# Patient Record
Sex: MALE | Race: BLACK OR AFRICAN AMERICAN | NOT HISPANIC OR LATINO | URBAN - METROPOLITAN AREA
[De-identification: names, ages, dates, MRNs, and addresses within clinical notes are randomized per-mention and may not be internally consistent; named-entity substitution may affect disease eponyms.]

---

## 2022-06-23 ENCOUNTER — EMERGENCY (EMERGENCY)
Facility: HOSPITAL | Age: 52
LOS: 1 days | Discharge: HOME | End: 2022-06-23
Attending: EMERGENCY MEDICINE
Payer: MEDICAID

## 2022-06-23 VITALS
RESPIRATION RATE: 18 BRPM | SYSTOLIC BLOOD PRESSURE: 120 MMHG | DIASTOLIC BLOOD PRESSURE: 73 MMHG | HEART RATE: 96 BPM | OXYGEN SATURATION: 100 %

## 2022-06-23 VITALS
SYSTOLIC BLOOD PRESSURE: 119 MMHG | OXYGEN SATURATION: 96 % | DIASTOLIC BLOOD PRESSURE: 82 MMHG | RESPIRATION RATE: 20 BRPM | HEART RATE: 132 BPM | HEIGHT: 64 IN | TEMPERATURE: 99 F | WEIGHT: 134.92 LBS

## 2022-06-23 LAB
ALBUMIN SERPL ELPH-MCNC: 3.9 G/DL — SIGNIFICANT CHANGE UP (ref 3.3–5)
ALBUMIN SERPL ELPH-MCNC: 3.9 G/DL — SIGNIFICANT CHANGE UP (ref 3.3–5)
ALP SERPL-CCNC: 139 U/L — HIGH (ref 40–120)
ALP SERPL-CCNC: 148 U/L — HIGH (ref 40–120)
ALT FLD-CCNC: 63 U/L — HIGH (ref 10–45)
ALT FLD-CCNC: 68 U/L — HIGH (ref 10–45)
ANION GAP SERPL CALC-SCNC: 22 MMOL/L — HIGH (ref 5–17)
ANION GAP SERPL CALC-SCNC: 24 MMOL/L — HIGH (ref 5–17)
ANISOCYTOSIS BLD QL: SIGNIFICANT CHANGE UP
APTT BLD: 32.8 SEC — SIGNIFICANT CHANGE UP (ref 27.5–35.5)
AST SERPL-CCNC: 149 U/L — HIGH (ref 10–40)
AST SERPL-CCNC: 170 U/L — HIGH (ref 10–40)
BASOPHILS # BLD AUTO: 0.06 K/UL — SIGNIFICANT CHANGE UP (ref 0–0.2)
BASOPHILS NFR BLD AUTO: 1.8 % — SIGNIFICANT CHANGE UP (ref 0–2)
BILIRUB SERPL-MCNC: 0.4 MG/DL — SIGNIFICANT CHANGE UP (ref 0.2–1.2)
BILIRUB SERPL-MCNC: 0.4 MG/DL — SIGNIFICANT CHANGE UP (ref 0.2–1.2)
BUN SERPL-MCNC: 12 MG/DL — SIGNIFICANT CHANGE UP (ref 7–23)
BUN SERPL-MCNC: 14 MG/DL — SIGNIFICANT CHANGE UP (ref 7–23)
CALCIUM SERPL-MCNC: 8.3 MG/DL — LOW (ref 8.4–10.5)
CALCIUM SERPL-MCNC: 8.4 MG/DL — SIGNIFICANT CHANGE UP (ref 8.4–10.5)
CHLORIDE SERPL-SCNC: 97 MMOL/L — SIGNIFICANT CHANGE UP (ref 96–108)
CHLORIDE SERPL-SCNC: 97 MMOL/L — SIGNIFICANT CHANGE UP (ref 96–108)
CO2 SERPL-SCNC: 20 MMOL/L — LOW (ref 22–31)
CO2 SERPL-SCNC: 23 MMOL/L — SIGNIFICANT CHANGE UP (ref 22–31)
CREAT SERPL-MCNC: 0.58 MG/DL — SIGNIFICANT CHANGE UP (ref 0.5–1.3)
CREAT SERPL-MCNC: 0.65 MG/DL — SIGNIFICANT CHANGE UP (ref 0.5–1.3)
EGFR: 116 ML/MIN/1.73M2 — SIGNIFICANT CHANGE UP
EGFR: 120 ML/MIN/1.73M2 — SIGNIFICANT CHANGE UP
ELLIPTOCYTES BLD QL SMEAR: SLIGHT — SIGNIFICANT CHANGE UP
EOSINOPHIL # BLD AUTO: 0.03 K/UL — SIGNIFICANT CHANGE UP (ref 0–0.5)
EOSINOPHIL NFR BLD AUTO: 0.9 % — SIGNIFICANT CHANGE UP (ref 0–6)
ETHANOL SERPL-MCNC: 365 MG/DL — HIGH (ref 0–10)
GLUCOSE SERPL-MCNC: 117 MG/DL — HIGH (ref 70–99)
GLUCOSE SERPL-MCNC: 122 MG/DL — HIGH (ref 70–99)
HCT VFR BLD CALC: 36.2 % — LOW (ref 39–50)
HGB BLD-MCNC: 12.3 G/DL — LOW (ref 13–17)
INR BLD: 1.02 RATIO — SIGNIFICANT CHANGE UP (ref 0.88–1.16)
LYMPHOCYTES # BLD AUTO: 1.19 K/UL — SIGNIFICANT CHANGE UP (ref 1–3.3)
LYMPHOCYTES # BLD AUTO: 36.6 % — SIGNIFICANT CHANGE UP (ref 13–44)
MACROCYTES BLD QL: SIGNIFICANT CHANGE UP
MANUAL SMEAR VERIFICATION: SIGNIFICANT CHANGE UP
MCHC RBC-ENTMCNC: 33.7 PG — SIGNIFICANT CHANGE UP (ref 27–34)
MCHC RBC-ENTMCNC: 34 GM/DL — SIGNIFICANT CHANGE UP (ref 32–36)
MCV RBC AUTO: 99.2 FL — SIGNIFICANT CHANGE UP (ref 80–100)
MONOCYTES # BLD AUTO: 0.29 K/UL — SIGNIFICANT CHANGE UP (ref 0–0.9)
MONOCYTES NFR BLD AUTO: 8.9 % — SIGNIFICANT CHANGE UP (ref 2–14)
NEUTROPHILS # BLD AUTO: 1.65 K/UL — LOW (ref 1.8–7.4)
NEUTROPHILS NFR BLD AUTO: 50.9 % — SIGNIFICANT CHANGE UP (ref 43–77)
PLAT MORPH BLD: NORMAL — SIGNIFICANT CHANGE UP
PLATELET # BLD AUTO: 64 K/UL — LOW (ref 150–400)
POTASSIUM SERPL-MCNC: 3.7 MMOL/L — SIGNIFICANT CHANGE UP (ref 3.5–5.3)
POTASSIUM SERPL-MCNC: 3.8 MMOL/L — SIGNIFICANT CHANGE UP (ref 3.5–5.3)
POTASSIUM SERPL-SCNC: 3.7 MMOL/L — SIGNIFICANT CHANGE UP (ref 3.5–5.3)
POTASSIUM SERPL-SCNC: 3.8 MMOL/L — SIGNIFICANT CHANGE UP (ref 3.5–5.3)
PROT SERPL-MCNC: 7 G/DL — SIGNIFICANT CHANGE UP (ref 6–8.3)
PROT SERPL-MCNC: 7.2 G/DL — SIGNIFICANT CHANGE UP (ref 6–8.3)
PROTHROM AB SERPL-ACNC: 11.7 SEC — SIGNIFICANT CHANGE UP (ref 10.5–13.4)
RBC # BLD: 3.65 M/UL — LOW (ref 4.2–5.8)
RBC # FLD: 12.8 % — SIGNIFICANT CHANGE UP (ref 10.3–14.5)
RBC BLD AUTO: ABNORMAL
SMUDGE CELLS # BLD: PRESENT — SIGNIFICANT CHANGE UP
SODIUM SERPL-SCNC: 141 MMOL/L — SIGNIFICANT CHANGE UP (ref 135–145)
SODIUM SERPL-SCNC: 142 MMOL/L — SIGNIFICANT CHANGE UP (ref 135–145)
TARGETS BLD QL SMEAR: SLIGHT — SIGNIFICANT CHANGE UP
TROPONIN T, HIGH SENSITIVITY RESULT: 20 NG/L — SIGNIFICANT CHANGE UP (ref 0–51)
VARIANT LYMPHS # BLD: 0.9 % — SIGNIFICANT CHANGE UP (ref 0–6)
WBC # BLD: 3.24 K/UL — LOW (ref 3.8–10.5)
WBC # FLD AUTO: 3.24 K/UL — LOW (ref 3.8–10.5)

## 2022-06-23 PROCEDURE — 93010 ELECTROCARDIOGRAM REPORT: CPT | Mod: 76,59

## 2022-06-23 PROCEDURE — 71045 X-RAY EXAM CHEST 1 VIEW: CPT

## 2022-06-23 PROCEDURE — 99285 EMERGENCY DEPT VISIT HI MDM: CPT | Mod: 25

## 2022-06-23 PROCEDURE — 80053 COMPREHEN METABOLIC PANEL: CPT

## 2022-06-23 PROCEDURE — 30901 CONTROL OF NOSEBLEED: CPT | Mod: RT

## 2022-06-23 PROCEDURE — 80307 DRUG TEST PRSMV CHEM ANLYZR: CPT

## 2022-06-23 PROCEDURE — 72125 CT NECK SPINE W/O DYE: CPT | Mod: MA

## 2022-06-23 PROCEDURE — 85610 PROTHROMBIN TIME: CPT

## 2022-06-23 PROCEDURE — 93005 ELECTROCARDIOGRAM TRACING: CPT | Mod: XU

## 2022-06-23 PROCEDURE — 84484 ASSAY OF TROPONIN QUANT: CPT

## 2022-06-23 PROCEDURE — 71045 X-RAY EXAM CHEST 1 VIEW: CPT | Mod: 26

## 2022-06-23 PROCEDURE — 85025 COMPLETE CBC W/AUTO DIFF WBC: CPT

## 2022-06-23 PROCEDURE — 85730 THROMBOPLASTIN TIME PARTIAL: CPT

## 2022-06-23 PROCEDURE — 72125 CT NECK SPINE W/O DYE: CPT | Mod: 26,MA

## 2022-06-23 PROCEDURE — 70450 CT HEAD/BRAIN W/O DYE: CPT | Mod: 26,MA

## 2022-06-23 PROCEDURE — 70450 CT HEAD/BRAIN W/O DYE: CPT | Mod: MA

## 2022-06-23 RX ORDER — SODIUM CHLORIDE 9 MG/ML
1000 INJECTION, SOLUTION INTRAVENOUS ONCE
Refills: 0 | Status: COMPLETED | OUTPATIENT
Start: 2022-06-23 | End: 2022-06-23

## 2022-06-23 RX ORDER — THIAMINE MONONITRATE (VIT B1) 100 MG
100 TABLET ORAL ONCE
Refills: 0 | Status: COMPLETED | OUTPATIENT
Start: 2022-06-23 | End: 2022-06-23

## 2022-06-23 RX ORDER — FOLIC ACID 0.8 MG
1 TABLET ORAL ONCE
Refills: 0 | Status: COMPLETED | OUTPATIENT
Start: 2022-06-23 | End: 2022-06-23

## 2022-06-23 RX ADMIN — SODIUM CHLORIDE 1000 MILLILITER(S): 9 INJECTION, SOLUTION INTRAVENOUS at 05:22

## 2022-06-23 RX ADMIN — Medication 1 MILLIGRAM(S): at 06:13

## 2022-06-23 RX ADMIN — Medication 1 TABLET(S): at 06:13

## 2022-06-23 RX ADMIN — Medication 100 MILLIGRAM(S): at 06:13

## 2022-06-23 RX ADMIN — SODIUM CHLORIDE 1000 MILLILITER(S): 9 INJECTION, SOLUTION INTRAVENOUS at 02:38

## 2022-06-23 RX ADMIN — SODIUM CHLORIDE 1000 MILLILITER(S): 9 INJECTION, SOLUTION INTRAVENOUS at 03:28

## 2022-06-23 NOTE — ED ADULT NURSE REASSESSMENT NOTE - NS ED NURSE REASSESS COMMENT FT1
Pt found to have flask of vodka on his person which he was drinking, RN asked for flask to discard alcohol and patient voluntarily gave it over. Pt alcohol flushed and bottle discarded. Pt educated on the importance of not drinking while in the hospital and states "[he] will not do it again, that is [his] last drink." RN will continue to monitor CIWA, VS, and patient behavior. Pt re-educated on use of the call bell and the importance of not taking off CM leads. GUADALUPE earl.

## 2022-06-23 NOTE — ED PROVIDER NOTE - PATIENT PORTAL LINK FT
You can access the FollowMyHealth Patient Portal offered by [facility] by registering at the following website: [email or web address]. By joining [employer]’s FollowMyHealth portal, you will also be able to view your health information using other applications (apps) compatible with our system.

## 2022-06-23 NOTE — ED ADULT NURSE REASSESSMENT NOTE - NS ED NURSE REASSESS COMMENT FT1
Pt requesting to "go outside for a second to take a smoke," pt educated on the importance of him staying in the stretcher for safety. Pt reminded of plan of care and offered to turn on the TV for distraction. Pt declined TV. Pt re-educated on use of the call bell and placed back on CM showing sinus tach. GUADALUPE Anaya aware.

## 2022-06-23 NOTE — ED ADULT NURSE NOTE - OBJECTIVE STATEMENT
51y male pmhx TB p/w fall. pt BIBA reports pt called ems after he tripped and fell, hitting his nose. reports trip and fall, denies preceding symptoms. denies LOC. ambulatory after the fall. epistaxis resolved with direct pressure. pt initially told EMS he had been drinking etOH all day, tells ED staff he only had 2 beers. denies hx etOH abuse or withdrawal. notably tachycardic (130s) in triage -code ekg called. Denies HA, NV, dizziness, vision changes, numbness, tingling, weakness, neck pain, CP, SOB, palpitations, abd pain, NVDC    Pt endorses drinking only on his days off "2 pints of vodka" 51y male pmhx TB p/w fall. pt BIBA reports pt called ems after he tripped and fell, hitting his nose. reports trip and fall, denies preceding symptoms. denies LOC. ambulatory after the fall. epistaxis resolved with direct pressure. pt initially told EMS he had been drinking etOH all day, tells ED staff he only had 2 beers, pt now endorsing drinking "2 pints of vodka but only on his days off". denies hx etOH abuse or withdrawal. notably tachycardic (130s) in triage -code ekg called. Denies HA, NV, dizziness, vision changes, numbness, tingling, weakness, neck pain, CP, SOB, palpitations, abd pain, NVDC

## 2022-06-23 NOTE — ED PROVIDER NOTE - NSFOLLOWUPCLINICS_GEN_ALL_ED_FT
[facility]  Cardiology  [address] [place] [id number]  Phone: [phone]  Fax:

## 2022-06-23 NOTE — ED PROVIDER NOTE - MUSCULOSKELETAL, MLM
Spine appears normal without midline ttp/deformity, range of motion is not limited, no muscle or joint tenderness

## 2022-06-23 NOTE — ED PROVIDER NOTE - PROGRESS NOTE DETAILS
pt became tachycardic 150s-160s appears sinus on tele. pt sitting forward at this time, reports he feels well. mentating well, no respiratory distress. denies CP/SOB/palpitations. R nare epistaxis reoccured with small bleed - clot removed, pledget with lido/epi placed with pt holding pressure. will reassess - Héctor Britt PA-C nasal pledgets removed - epistaxis appears resolved. elevated AG and alcohol level, pt still clinically intoxicated. will continue IVF, followup CT/XRs - Héctor Britt PA-C per ANDREW Dave pt was found to have alcohol in flask which he voluntarily gave over - Héctor Britt PA-C nasal septal fracture on CTs, degenerative disc disease. pt was explained all CT/XR results. pt is clinically sober at this time. Will dc with plastic surgery/cardiology follow up. Discussed plan and return precautions with patient who understands and agrees. All questions answered. - Héctor Britt PA-C

## 2022-06-23 NOTE — ED ADULT NURSE REASSESSMENT NOTE - NS ED NURSE REASSESS COMMENT FT1
Pt ripped out IV and took off CM leads. Pt walked out of room w/o assistance and states he Pt ripped out IV and took off CM leads. Pt walked out of room w/o assistance and states he "is leaving." Pt escorted back to room and assisted back into stretcher. New IV placed, pt placed back on CM and re-educated on using call junior. GUADALUPE Anaya aware.

## 2022-06-23 NOTE — ED PROVIDER NOTE - HIV OFFER
Recheck liver enzymes on Thursday, February 8  Avoid Tylenol (acetaminophen), Ibuprofen products, alcohol, tobacco.  Continue Omeprazole every am and Pantoprazole every PM  
Opt out

## 2022-06-23 NOTE — ED PROVIDER NOTE - NPI NUMBER (FOR [employer] USE ONLY) :
no tinnitus/no sinus symptoms/no post-nasal discharge/no nose bleeds/no hearing difficulty/no ear pain/no vertigo/no nasal obstruction/no nasal congestion [[id number]]

## 2022-06-23 NOTE — ED PROVIDER NOTE - ATTENDING APP SHARED VISIT CONTRIBUTION OF CARE
Afebrile. Awake and Alert. Dried blood in nares, nose mid-line. No CS TTP. Lungs CTA. Heart rapid and regular. Abdomen soft NTND. CN II-XII grossly intact. Moves all extremities without lateralization.    CTH/CTCS as intoxicated  Mechanical fall in the context of alcohol intoxication  EKG sinus tachycardia Afebrile. Awake and Alert. Dried blood in nares, nose mid-line. No CS TTP. Lungs CTA. Heart rapid and regular. Abdomen soft NTND. CN II-XII grossly intact. Moves all extremities without lateralization.    CTH/CTCS as intoxicated  Mechanical fall in the context of alcohol intoxication  EKG sinus tachycardia  Nasal fracture with hemostasis per note

## 2022-06-23 NOTE — ED PROVIDER NOTE - OBJECTIVE STATEMENT
51y male pmhx TB p/w fall. pt BIBA reports pt called ems after he tripped and fell, hitting his nose. reports trip and fall, denies preceding symptoms. denies LOC. ambulatory after the fall. epistaxis resolved with direct pressure. pt initially told EMS he had been drinking etOH all day, tells ED staff he only had 2 beers. denies hx etOH abuse or withdrawal. Denies HA, NV, dizziness, vision changes, numbness, tingling, weakness, neck pain, CP, SOB, abd pain, NVDC 51y male pmhx TB p/w fall. pt BIBA reports pt called ems after he tripped and fell, hitting his nose. reports trip and fall, denies preceding symptoms. denies LOC. ambulatory after the fall. epistaxis resolved with direct pressure. pt initially told EMS he had been drinking etOH all day, tells ED staff he only had 2 beers. denies hx etOH abuse or withdrawal. notably tachycardic (130s) in triage -code ekg called. Denies HA, NV, dizziness, vision changes, numbness, tingling, weakness, neck pain, CP, SOB, palpitations, abd pain, NVDC

## 2022-06-23 NOTE — ED PROVIDER NOTE - CARE PLAN
Principal Discharge DX:	Closed head injury  Secondary Diagnosis:	Alcohol intoxication   1 Principal Discharge DX:	Closed head injury  Secondary Diagnosis:	Nasal fracture

## 2022-06-23 NOTE — ED PROVIDER NOTE - NS ED ATTENDING STATEMENT MOD
This was a shared visit with the ALEXIA. I reviewed and verified the documentation and independently performed the documented:

## 2022-06-23 NOTE — ED PROVIDER NOTE - NSFOLLOWUPINSTRUCTIONS_ED_ALL_ED_FT
Please follow up with your primary care doctor within 1 week.  *Bring all printed lab/test results to your appointment(s).*    Stay well hydrated with water and electrolyte replacement solutions such as Pedialyte or the adult equivalent.    Return to the ED for new falls, dizziness, chest pain, shortness of breath, or any other concerns. Please follow up with your primary care doctor within 1 week.  *Bring all printed lab/test results to your appointment(s).*    Stay well hydrated with water and electrolyte replacement solutions such as Pedialyte or the adult equivalent.    Return to the ED for new falls, dizziness, chest pain, shortness of breath, or any other concerns.    IMPORTANT PRECAUTIONS TO TAKE FOR YOUR BROKEN NOSE:    DO NOT blow your nose for at least two weeks.  DO NOT forcibly spit for one week.  DO NOT smoke or use smokeless tobacco; smoking greatly inhibits the healing process, especially in the sinuses.  Sneeze with your MOUTH OPEN. If the urge to sneeze arises, do not sneeze through your nose and avoid pinching nostrils.  Drink without a straw for one week.  Avoid swimming for one month and strenuous exercise (e.g. heavy lifting) for one week.  Gentle swishing with salt water may be done for one week, but do not rinse vigorously.  Slight bleeding from the nose is not uncommon and may occur for several days after surgery. Please follow up with your primary care doctor within 1 week.  Please follow up with plastic surgeon Dr. Hernandez for your nasal fracture (see contact info)  Please follow up with cardiology within 1 week (see contact info)    *Bring all printed lab/test results to your appointment(s).*    Stay well hydrated with water and electrolyte replacement solutions such as Pedialyte or the adult equivalent.    Return to the ED for new falls, dizziness, chest pain, shortness of breath, or any other concerns.    IMPORTANT PRECAUTIONS TO TAKE FOR YOUR BROKEN NOSE:    DO NOT blow your nose for at least two weeks.  DO NOT forcibly spit for one week.  DO NOT smoke or use smokeless tobacco; smoking greatly inhibits the healing process, especially in the sinuses.  Sneeze with your MOUTH OPEN. If the urge to sneeze arises, do not sneeze through your nose and avoid pinching nostrils.  Drink without a straw for one week.  Avoid swimming for one month and strenuous exercise (e.g. heavy lifting) for one week.  Gentle swishing with salt water may be done for one week, but do not rinse vigorously.  Slight bleeding from the nose is not uncommon and may occur for several days after surgery.

## 2022-06-24 NOTE — ED PROCEDURE NOTE - ATTENDING APP SHARED VISIT CONTRIBUTION OF CARE
I was physically present for the E/M service provided. I was physically present for the key portions of the service provided. DEVAN.

## 2022-07-06 ENCOUNTER — INPATIENT (INPATIENT)
Facility: HOSPITAL | Age: 52
LOS: 13 days | Discharge: HOME | End: 2022-07-20
Attending: STUDENT IN AN ORGANIZED HEALTH CARE EDUCATION/TRAINING PROGRAM | Admitting: STUDENT IN AN ORGANIZED HEALTH CARE EDUCATION/TRAINING PROGRAM

## 2022-07-06 VITALS
RESPIRATION RATE: 18 BRPM | OXYGEN SATURATION: 100 % | HEART RATE: 101 BPM | TEMPERATURE: 98 F | DIASTOLIC BLOOD PRESSURE: 78 MMHG | SYSTOLIC BLOOD PRESSURE: 117 MMHG

## 2022-07-06 DIAGNOSIS — F10.239 ALCOHOL DEPENDENCE WITH WITHDRAWAL, UNSPECIFIED: ICD-10-CM

## 2022-07-06 LAB
A1C WITH ESTIMATED AVERAGE GLUCOSE RESULT: 4.6 % — SIGNIFICANT CHANGE UP (ref 4–5.6)
ALBUMIN SERPL ELPH-MCNC: 3.5 G/DL — SIGNIFICANT CHANGE UP (ref 3.3–5)
ALBUMIN SERPL ELPH-MCNC: 3.9 G/DL — SIGNIFICANT CHANGE UP (ref 3.3–5)
ALP SERPL-CCNC: 118 U/L — SIGNIFICANT CHANGE UP (ref 40–120)
ALP SERPL-CCNC: 139 U/L — HIGH (ref 40–120)
ALT FLD-CCNC: 23 U/L — SIGNIFICANT CHANGE UP (ref 4–41)
ALT FLD-CCNC: 26 U/L — SIGNIFICANT CHANGE UP (ref 4–41)
ANION GAP SERPL CALC-SCNC: 11 MMOL/L — SIGNIFICANT CHANGE UP (ref 7–14)
ANION GAP SERPL CALC-SCNC: 12 MMOL/L — SIGNIFICANT CHANGE UP (ref 7–14)
ANION GAP SERPL CALC-SCNC: 13 MMOL/L — SIGNIFICANT CHANGE UP (ref 7–14)
APTT BLD: 24.9 SEC — LOW (ref 27–36.3)
AST SERPL-CCNC: 61 U/L — HIGH (ref 4–40)
AST SERPL-CCNC: 69 U/L — HIGH (ref 4–40)
BASOPHILS # BLD AUTO: 0.02 K/UL — SIGNIFICANT CHANGE UP (ref 0–0.2)
BASOPHILS # BLD AUTO: 0.02 K/UL — SIGNIFICANT CHANGE UP (ref 0–0.2)
BASOPHILS # BLD AUTO: 0.21 K/UL — HIGH (ref 0–0.2)
BASOPHILS NFR BLD AUTO: 0.3 % — SIGNIFICANT CHANGE UP (ref 0–2)
BASOPHILS NFR BLD AUTO: 0.4 % — SIGNIFICANT CHANGE UP (ref 0–2)
BASOPHILS NFR BLD AUTO: 3.7 % — HIGH (ref 0–2)
BILIRUB SERPL-MCNC: 1.2 MG/DL — SIGNIFICANT CHANGE UP (ref 0.2–1.2)
BILIRUB SERPL-MCNC: 1.3 MG/DL — HIGH (ref 0.2–1.2)
BLD GP AB SCN SERPL QL: NEGATIVE — SIGNIFICANT CHANGE UP
BLOOD GAS VENOUS COMPREHENSIVE RESULT: SIGNIFICANT CHANGE UP
BUN SERPL-MCNC: 11 MG/DL — SIGNIFICANT CHANGE UP (ref 7–23)
BUN SERPL-MCNC: 4 MG/DL — LOW (ref 7–23)
BUN SERPL-MCNC: 6 MG/DL — LOW (ref 7–23)
CALCIUM SERPL-MCNC: 10.4 MG/DL — SIGNIFICANT CHANGE UP (ref 8.4–10.5)
CALCIUM SERPL-MCNC: 9.1 MG/DL — SIGNIFICANT CHANGE UP (ref 8.4–10.5)
CALCIUM SERPL-MCNC: 9.7 MG/DL — SIGNIFICANT CHANGE UP (ref 8.4–10.5)
CHLORIDE SERPL-SCNC: 91 MMOL/L — LOW (ref 98–107)
CHLORIDE SERPL-SCNC: 94 MMOL/L — LOW (ref 98–107)
CHLORIDE SERPL-SCNC: 95 MMOL/L — LOW (ref 98–107)
CO2 SERPL-SCNC: 30 MMOL/L — SIGNIFICANT CHANGE UP (ref 22–31)
CO2 SERPL-SCNC: 31 MMOL/L — SIGNIFICANT CHANGE UP (ref 22–31)
CO2 SERPL-SCNC: 33 MMOL/L — HIGH (ref 22–31)
CREAT SERPL-MCNC: 0.58 MG/DL — SIGNIFICANT CHANGE UP (ref 0.5–1.3)
CREAT SERPL-MCNC: 0.61 MG/DL — SIGNIFICANT CHANGE UP (ref 0.5–1.3)
CREAT SERPL-MCNC: 0.87 MG/DL — SIGNIFICANT CHANGE UP (ref 0.5–1.3)
EGFR: 106 ML/MIN/1.73M2 — SIGNIFICANT CHANGE UP
EGFR: 118 ML/MIN/1.73M2 — SIGNIFICANT CHANGE UP
EGFR: 120 ML/MIN/1.73M2 — SIGNIFICANT CHANGE UP
EOSINOPHIL # BLD AUTO: 0.05 K/UL — SIGNIFICANT CHANGE UP (ref 0–0.5)
EOSINOPHIL # BLD AUTO: 0.06 K/UL — SIGNIFICANT CHANGE UP (ref 0–0.5)
EOSINOPHIL # BLD AUTO: 0.09 K/UL — SIGNIFICANT CHANGE UP (ref 0–0.5)
EOSINOPHIL NFR BLD AUTO: 0.9 % — SIGNIFICANT CHANGE UP (ref 0–6)
EOSINOPHIL NFR BLD AUTO: 1.2 % — SIGNIFICANT CHANGE UP (ref 0–6)
EOSINOPHIL NFR BLD AUTO: 1.5 % — SIGNIFICANT CHANGE UP (ref 0–6)
ESTIMATED AVERAGE GLUCOSE: 85 — SIGNIFICANT CHANGE UP
GLUCOSE SERPL-MCNC: 107 MG/DL — HIGH (ref 70–99)
GLUCOSE SERPL-MCNC: 130 MG/DL — HIGH (ref 70–99)
GLUCOSE SERPL-MCNC: 132 MG/DL — HIGH (ref 70–99)
HCT VFR BLD CALC: 21.7 % — LOW (ref 39–50)
HCT VFR BLD CALC: 23.4 % — LOW (ref 39–50)
HCT VFR BLD CALC: 25.5 % — LOW (ref 39–50)
HGB BLD-MCNC: 7.5 G/DL — LOW (ref 13–17)
HGB BLD-MCNC: 7.8 G/DL — LOW (ref 13–17)
HGB BLD-MCNC: 8.2 G/DL — LOW (ref 13–17)
HIV 1+2 AB+HIV1 P24 AG SERPL QL IA: SIGNIFICANT CHANGE UP
IANC: 2.92 K/UL — SIGNIFICANT CHANGE UP (ref 1.8–7.4)
IANC: 3.16 K/UL — SIGNIFICANT CHANGE UP (ref 1.8–7.4)
IANC: 3.99 K/UL — SIGNIFICANT CHANGE UP (ref 1.8–7.4)
IMM GRANULOCYTES NFR BLD AUTO: 0.6 % — SIGNIFICANT CHANGE UP (ref 0–1.5)
IMM GRANULOCYTES NFR BLD AUTO: 1 % — SIGNIFICANT CHANGE UP (ref 0–1.5)
INR BLD: 1.05 RATIO — SIGNIFICANT CHANGE UP (ref 0.88–1.16)
LIDOCAIN IGE QN: 46 U/L — SIGNIFICANT CHANGE UP (ref 7–60)
LYMPHOCYTES # BLD AUTO: 0.79 K/UL — LOW (ref 1–3.3)
LYMPHOCYTES # BLD AUTO: 0.91 K/UL — LOW (ref 1–3.3)
LYMPHOCYTES # BLD AUTO: 1.23 K/UL — SIGNIFICANT CHANGE UP (ref 1–3.3)
LYMPHOCYTES # BLD AUTO: 12.8 % — LOW (ref 13–44)
LYMPHOCYTES # BLD AUTO: 18.3 % — SIGNIFICANT CHANGE UP (ref 13–44)
LYMPHOCYTES # BLD AUTO: 21.1 % — SIGNIFICANT CHANGE UP (ref 13–44)
MAGNESIUM SERPL-MCNC: 0.9 MG/DL — CRITICAL LOW (ref 1.6–2.6)
MAGNESIUM SERPL-MCNC: 1 MG/DL — CRITICAL LOW (ref 1.6–2.6)
MAGNESIUM SERPL-MCNC: 1.9 MG/DL — SIGNIFICANT CHANGE UP (ref 1.6–2.6)
MCHC RBC-ENTMCNC: 32.2 GM/DL — SIGNIFICANT CHANGE UP (ref 32–36)
MCHC RBC-ENTMCNC: 33.2 PG — SIGNIFICANT CHANGE UP (ref 27–34)
MCHC RBC-ENTMCNC: 33.3 GM/DL — SIGNIFICANT CHANGE UP (ref 32–36)
MCHC RBC-ENTMCNC: 33.6 PG — SIGNIFICANT CHANGE UP (ref 27–34)
MCHC RBC-ENTMCNC: 34.4 PG — HIGH (ref 27–34)
MCHC RBC-ENTMCNC: 34.6 GM/DL — SIGNIFICANT CHANGE UP (ref 32–36)
MCV RBC AUTO: 100.9 FL — HIGH (ref 80–100)
MCV RBC AUTO: 103.2 FL — HIGH (ref 80–100)
MCV RBC AUTO: 99.5 FL — SIGNIFICANT CHANGE UP (ref 80–100)
MONOCYTES # BLD AUTO: 0.91 K/UL — HIGH (ref 0–0.9)
MONOCYTES # BLD AUTO: 1.02 K/UL — HIGH (ref 0–0.9)
MONOCYTES # BLD AUTO: 1.26 K/UL — HIGH (ref 0–0.9)
MONOCYTES NFR BLD AUTO: 15.6 % — HIGH (ref 2–14)
MONOCYTES NFR BLD AUTO: 20.4 % — HIGH (ref 2–14)
MONOCYTES NFR BLD AUTO: 20.5 % — HIGH (ref 2–14)
NEUTROPHILS # BLD AUTO: 2.92 K/UL — SIGNIFICANT CHANGE UP (ref 1.8–7.4)
NEUTROPHILS # BLD AUTO: 3.31 K/UL — SIGNIFICANT CHANGE UP (ref 1.8–7.4)
NEUTROPHILS # BLD AUTO: 3.99 K/UL — SIGNIFICANT CHANGE UP (ref 1.8–7.4)
NEUTROPHILS NFR BLD AUTO: 56.9 % — SIGNIFICANT CHANGE UP (ref 43–77)
NEUTROPHILS NFR BLD AUTO: 58.6 % — SIGNIFICANT CHANGE UP (ref 43–77)
NEUTROPHILS NFR BLD AUTO: 64.4 % — SIGNIFICANT CHANGE UP (ref 43–77)
NRBC # BLD: 0 /100 WBCS — SIGNIFICANT CHANGE UP
NRBC # BLD: 0 /100 WBCS — SIGNIFICANT CHANGE UP
NRBC # FLD: 0 K/UL — SIGNIFICANT CHANGE UP
NRBC # FLD: 0.02 K/UL — HIGH
PHOSPHATE SERPL-MCNC: 2.8 MG/DL — SIGNIFICANT CHANGE UP (ref 2.5–4.5)
PHOSPHATE SERPL-MCNC: 2.8 MG/DL — SIGNIFICANT CHANGE UP (ref 2.5–4.5)
PHOSPHATE SERPL-MCNC: 2.9 MG/DL — SIGNIFICANT CHANGE UP (ref 2.5–4.5)
PLATELET # BLD AUTO: 104 K/UL — LOW (ref 150–400)
PLATELET # BLD AUTO: 104 K/UL — LOW (ref 150–400)
PLATELET # BLD AUTO: 106 K/UL — LOW (ref 150–400)
POTASSIUM SERPL-MCNC: 2.8 MMOL/L — CRITICAL LOW (ref 3.5–5.3)
POTASSIUM SERPL-MCNC: 3.2 MMOL/L — LOW (ref 3.5–5.3)
POTASSIUM SERPL-MCNC: 4 MMOL/L — SIGNIFICANT CHANGE UP (ref 3.5–5.3)
POTASSIUM SERPL-SCNC: 2.8 MMOL/L — CRITICAL LOW (ref 3.5–5.3)
POTASSIUM SERPL-SCNC: 3.2 MMOL/L — LOW (ref 3.5–5.3)
POTASSIUM SERPL-SCNC: 4 MMOL/L — SIGNIFICANT CHANGE UP (ref 3.5–5.3)
PROT SERPL-MCNC: 6.5 G/DL — SIGNIFICANT CHANGE UP (ref 6–8.3)
PROT SERPL-MCNC: 6.8 G/DL — SIGNIFICANT CHANGE UP (ref 6–8.3)
PROTHROM AB SERPL-ACNC: 12.2 SEC — SIGNIFICANT CHANGE UP (ref 10.5–13.4)
RBC # BLD: 2.18 M/UL — LOW (ref 4.2–5.8)
RBC # BLD: 2.32 M/UL — LOW (ref 4.2–5.8)
RBC # BLD: 2.47 M/UL — LOW (ref 4.2–5.8)
RBC # FLD: 13 % — SIGNIFICANT CHANGE UP (ref 10.3–14.5)
RBC # FLD: 13 % — SIGNIFICANT CHANGE UP (ref 10.3–14.5)
RBC # FLD: 13.5 % — SIGNIFICANT CHANGE UP (ref 10.3–14.5)
RH IG SCN BLD-IMP: NEGATIVE — SIGNIFICANT CHANGE UP
SARS-COV-2 RNA SPEC QL NAA+PROBE: SIGNIFICANT CHANGE UP
SODIUM SERPL-SCNC: 134 MMOL/L — LOW (ref 135–145)
SODIUM SERPL-SCNC: 138 MMOL/L — SIGNIFICANT CHANGE UP (ref 135–145)
SODIUM SERPL-SCNC: 138 MMOL/L — SIGNIFICANT CHANGE UP (ref 135–145)
TOXICOLOGY SCREEN, DRUGS OF ABUSE, SERUM RESULT: SIGNIFICANT CHANGE UP
TSH SERPL-MCNC: 2.41 UIU/ML — SIGNIFICANT CHANGE UP (ref 0.27–4.2)
WBC # BLD: 4.98 K/UL — SIGNIFICANT CHANGE UP (ref 3.8–10.5)
WBC # BLD: 5.81 K/UL — SIGNIFICANT CHANGE UP (ref 3.8–10.5)
WBC # BLD: 6.19 K/UL — SIGNIFICANT CHANGE UP (ref 3.8–10.5)
WBC # FLD AUTO: 4.98 K/UL — SIGNIFICANT CHANGE UP (ref 3.8–10.5)
WBC # FLD AUTO: 5.81 K/UL — SIGNIFICANT CHANGE UP (ref 3.8–10.5)
WBC # FLD AUTO: 6.19 K/UL — SIGNIFICANT CHANGE UP (ref 3.8–10.5)

## 2022-07-06 PROCEDURE — 71045 X-RAY EXAM CHEST 1 VIEW: CPT | Mod: 26

## 2022-07-06 PROCEDURE — 76604 US EXAM CHEST: CPT | Mod: 26,GC

## 2022-07-06 PROCEDURE — 93308 TTE F-UP OR LMTD: CPT | Mod: 26,GC

## 2022-07-06 PROCEDURE — 99285 EMERGENCY DEPT VISIT HI MDM: CPT | Mod: GC

## 2022-07-06 PROCEDURE — ZZZZZ: CPT

## 2022-07-06 PROCEDURE — 93010 ELECTROCARDIOGRAM REPORT: CPT

## 2022-07-06 PROCEDURE — 70450 CT HEAD/BRAIN W/O DYE: CPT | Mod: 26,MA

## 2022-07-06 PROCEDURE — 99222 1ST HOSP IP/OBS MODERATE 55: CPT | Mod: GC

## 2022-07-06 PROCEDURE — 99291 CRITICAL CARE FIRST HOUR: CPT | Mod: 25

## 2022-07-06 RX ORDER — PANTOPRAZOLE SODIUM 20 MG/1
80 TABLET, DELAYED RELEASE ORAL ONCE
Refills: 0 | Status: COMPLETED | OUTPATIENT
Start: 2022-07-06 | End: 2022-07-06

## 2022-07-06 RX ORDER — FOLIC ACID 0.8 MG
1 TABLET ORAL DAILY
Refills: 0 | Status: DISCONTINUED | OUTPATIENT
Start: 2022-07-06 | End: 2022-07-20

## 2022-07-06 RX ORDER — POTASSIUM CHLORIDE 20 MEQ
40 PACKET (EA) ORAL ONCE
Refills: 0 | Status: COMPLETED | OUTPATIENT
Start: 2022-07-06 | End: 2022-07-06

## 2022-07-06 RX ORDER — THIAMINE MONONITRATE (VIT B1) 100 MG
100 TABLET ORAL ONCE
Refills: 0 | Status: DISCONTINUED | OUTPATIENT
Start: 2022-07-06 | End: 2022-07-06

## 2022-07-06 RX ORDER — OCTREOTIDE ACETATE 200 UG/ML
50 INJECTION, SOLUTION INTRAVENOUS; SUBCUTANEOUS ONCE
Refills: 0 | Status: COMPLETED | OUTPATIENT
Start: 2022-07-06 | End: 2022-07-06

## 2022-07-06 RX ORDER — SODIUM CHLORIDE 9 MG/ML
1000 INJECTION, SOLUTION INTRAVENOUS ONCE
Refills: 0 | Status: COMPLETED | OUTPATIENT
Start: 2022-07-06 | End: 2022-07-06

## 2022-07-06 RX ORDER — SODIUM CHLORIDE 9 MG/ML
1000 INJECTION INTRAMUSCULAR; INTRAVENOUS; SUBCUTANEOUS ONCE
Refills: 0 | Status: COMPLETED | OUTPATIENT
Start: 2022-07-06 | End: 2022-07-06

## 2022-07-06 RX ORDER — THIAMINE MONONITRATE (VIT B1) 100 MG
500 TABLET ORAL ONCE
Refills: 0 | Status: COMPLETED | OUTPATIENT
Start: 2022-07-06 | End: 2022-07-06

## 2022-07-06 RX ORDER — PANTOPRAZOLE SODIUM 20 MG/1
40 TABLET, DELAYED RELEASE ORAL EVERY 12 HOURS
Refills: 0 | Status: DISCONTINUED | OUTPATIENT
Start: 2022-07-07 | End: 2022-07-16

## 2022-07-06 RX ORDER — DEXMEDETOMIDINE HYDROCHLORIDE IN 0.9% SODIUM CHLORIDE 4 UG/ML
0.2 INJECTION INTRAVENOUS
Qty: 200 | Refills: 0 | Status: DISCONTINUED | OUTPATIENT
Start: 2022-07-06 | End: 2022-07-07

## 2022-07-06 RX ORDER — PHENOBARBITAL 60 MG
130 TABLET ORAL
Refills: 0 | Status: DISCONTINUED | OUTPATIENT
Start: 2022-07-06 | End: 2022-07-07

## 2022-07-06 RX ORDER — POTASSIUM CHLORIDE 20 MEQ
10 PACKET (EA) ORAL ONCE
Refills: 0 | Status: COMPLETED | OUTPATIENT
Start: 2022-07-06 | End: 2022-07-06

## 2022-07-06 RX ORDER — PHENOBARBITAL 60 MG
130 TABLET ORAL ONCE
Refills: 0 | Status: DISCONTINUED | OUTPATIENT
Start: 2022-07-06 | End: 2022-07-06

## 2022-07-06 RX ORDER — FOLIC ACID 0.8 MG
1 TABLET ORAL ONCE
Refills: 0 | Status: DISCONTINUED | OUTPATIENT
Start: 2022-07-06 | End: 2022-07-06

## 2022-07-06 RX ORDER — CHLORHEXIDINE GLUCONATE 213 G/1000ML
1 SOLUTION TOPICAL
Refills: 0 | Status: DISCONTINUED | OUTPATIENT
Start: 2022-07-06 | End: 2022-07-07

## 2022-07-06 RX ORDER — PHENOBARBITAL 60 MG
600 TABLET ORAL ONCE
Refills: 0 | Status: DISCONTINUED | OUTPATIENT
Start: 2022-07-06 | End: 2022-07-06

## 2022-07-06 RX ORDER — MAGNESIUM SULFATE 500 MG/ML
4 VIAL (ML) INJECTION ONCE
Refills: 0 | Status: COMPLETED | OUTPATIENT
Start: 2022-07-06 | End: 2022-07-06

## 2022-07-06 RX ORDER — FOLIC ACID 0.8 MG
1 TABLET ORAL ONCE
Refills: 0 | Status: COMPLETED | OUTPATIENT
Start: 2022-07-06 | End: 2022-07-06

## 2022-07-06 RX ORDER — ACETAMINOPHEN 500 MG
1000 TABLET ORAL ONCE
Refills: 0 | Status: COMPLETED | OUTPATIENT
Start: 2022-07-06 | End: 2022-07-06

## 2022-07-06 RX ORDER — THIAMINE MONONITRATE (VIT B1) 100 MG
500 TABLET ORAL EVERY 8 HOURS
Refills: 0 | Status: DISCONTINUED | OUTPATIENT
Start: 2022-07-06 | End: 2022-07-07

## 2022-07-06 RX ORDER — POTASSIUM CHLORIDE 20 MEQ
10 PACKET (EA) ORAL
Refills: 0 | Status: COMPLETED | OUTPATIENT
Start: 2022-07-06 | End: 2022-07-06

## 2022-07-06 RX ORDER — THIAMINE MONONITRATE (VIT B1) 100 MG
100 TABLET ORAL ONCE
Refills: 0 | Status: COMPLETED | OUTPATIENT
Start: 2022-07-06 | End: 2022-07-06

## 2022-07-06 RX ADMIN — SODIUM CHLORIDE 1000 MILLILITER(S): 9 INJECTION INTRAMUSCULAR; INTRAVENOUS; SUBCUTANEOUS at 03:25

## 2022-07-06 RX ADMIN — Medication 100 MILLIEQUIVALENT(S): at 15:49

## 2022-07-06 RX ADMIN — Medication 1 MILLIGRAM(S): at 03:33

## 2022-07-06 RX ADMIN — Medication 100 MILLIEQUIVALENT(S): at 13:44

## 2022-07-06 RX ADMIN — DEXMEDETOMIDINE HYDROCHLORIDE IN 0.9% SODIUM CHLORIDE 3 MICROGRAM(S)/KG/HR: 4 INJECTION INTRAVENOUS at 11:21

## 2022-07-06 RX ADMIN — Medication 100 MILLIEQUIVALENT(S): at 17:57

## 2022-07-06 RX ADMIN — Medication 2 MILLIGRAM(S): at 06:09

## 2022-07-06 RX ADMIN — Medication 100 MILLIEQUIVALENT(S): at 14:51

## 2022-07-06 RX ADMIN — Medication 40 MILLIEQUIVALENT(S): at 06:09

## 2022-07-06 RX ADMIN — Medication 130 MILLIGRAM(S): at 11:30

## 2022-07-06 RX ADMIN — Medication 100 MILLIEQUIVALENT(S): at 06:19

## 2022-07-06 RX ADMIN — Medication 25 GRAM(S): at 13:29

## 2022-07-06 RX ADMIN — PANTOPRAZOLE SODIUM 80 MILLIGRAM(S): 20 TABLET, DELAYED RELEASE ORAL at 18:01

## 2022-07-06 RX ADMIN — Medication 2 MILLIGRAM(S): at 03:25

## 2022-07-06 RX ADMIN — Medication 105 MILLIGRAM(S): at 08:53

## 2022-07-06 RX ADMIN — Medication 105 MILLIGRAM(S): at 17:23

## 2022-07-06 RX ADMIN — Medication 100 MILLIGRAM(S): at 03:32

## 2022-07-06 RX ADMIN — SODIUM CHLORIDE 1000 MILLILITER(S): 9 INJECTION, SOLUTION INTRAVENOUS at 11:57

## 2022-07-06 RX ADMIN — Medication 400 MILLIGRAM(S): at 19:55

## 2022-07-06 RX ADMIN — Medication 100 MILLIEQUIVALENT(S): at 18:58

## 2022-07-06 RX ADMIN — OCTREOTIDE ACETATE 50 MICROGRAM(S): 200 INJECTION, SOLUTION INTRAVENOUS; SUBCUTANEOUS at 18:01

## 2022-07-06 RX ADMIN — Medication 209.24 MILLIGRAM(S): at 08:02

## 2022-07-06 RX ADMIN — Medication 100 MILLIEQUIVALENT(S): at 16:56

## 2022-07-06 RX ADMIN — Medication 1000 MILLIGRAM(S): at 20:15

## 2022-07-06 RX ADMIN — Medication 1 TABLET(S): at 03:26

## 2022-07-06 RX ADMIN — CHLORHEXIDINE GLUCONATE 1 APPLICATION(S): 213 SOLUTION TOPICAL at 11:59

## 2022-07-06 NOTE — ED ADULT TRIAGE NOTE - CHIEF COMPLAINT QUOTE
Pt arrives with EMS, family states he left the home for 3 hours. Family states he was acting abnormally upon return, inappropriate statements. States he took NyQuil for cold at 2100, denies all ETOH and drug use. Pupils noted to be dilated. PMHx ETOH abuse (last drink was 3 days ago.) Fasciculations noted to tongue, endorsing occasional visual hallucinations this week. .

## 2022-07-06 NOTE — ED PROVIDER NOTE - CROS ED NEURO NEG
no headache/no difficulty walking/imbalance/no seizures/no change in level of consciousness AS PER FATHER , PATIENT PASSED OUT WHILE EATING TODAY. AMBULATING WITH STEADY GAIT.

## 2022-07-06 NOTE — H&P ADULT - NSHPLABSRESULTS_GEN_ALL_CORE
07-06    134<L>  |  91<L>  |  11  ----------------------------<  130<H>  3.2<L>   |  31  |  0.87    Ca    10.4      06 Jul 2022 03:27  Phos  2.8     07-06    TPro  6.8  /  Alb  3.9  /  TBili  1.2  /  DBili  x   /  AST  69<H>  /  ALT  26  /  AlkPhos  139<H>  07-06                          7.5    5.81  )-----------( 104      ( 06 Jul 2022 03:27 )             21.7 07-06    138  |  94<L>  |  6<L>  ----------------------------<  107<H>  2.8<LL>   |  33<H>  |  0.58    Ca    9.7      06 Jul 2022 11:48  Phos  2.8     07-06  Mg     0.90     07-06    TPro  6.5  /  Alb  3.5  /  TBili  1.3<H>  /  DBili  x   /  AST  61<H>  /  ALT  23  /  AlkPhos  118  07-06                          7.8    4.98  )-----------( 106      ( 06 Jul 2022 11:48 )             23.4

## 2022-07-06 NOTE — ED ADULT NURSE NOTE - NSIMPLEMENTINTERV_GEN_ALL_ED
Implemented All Fall Risk Interventions:  [place] to call system. Call bell, personal items and telephone within reach. Instruct patient to call for assistance. Room bathroom lighting operational. Non-slip footwear when patient is off stretcher. Physically safe environment: no spills, clutter or unnecessary equipment. Stretcher in lowest position, wheels locked, appropriate side rails in place. Provide visual cue, wrist band, yellow gown, etc. Monitor gait and stability. Monitor for mental status changes and reorient to person, place, and time. Review medications for side effects contributing to fall risk. Reinforce activity limits and safety measures with patient and family.

## 2022-07-06 NOTE — ED PROVIDER NOTE - CLINICAL SUMMARY MEDICAL DECISION MAKING FREE TEXT BOX
49 yo M with no reported Past Medical History but has an alcohol drinking history per sister that was brought in by EMS from home when pt called 911 himself stating people were trapping him and he was seeing mice, sister states there was nobody there and no mice. Pt stopped drinking recently as he was fired from job due to allegedly drinking on job. Pt is likely in withdrawal. Pupils bilaterally dilated and minimally reactive. Pt has tremors and tongue fasiculations on exam. WIll treat for withdrawal with BZP, IVF, obtain labs and imaging and likely admit.

## 2022-07-06 NOTE — ED ADULT NURSE REASSESSMENT NOTE - NS ED NURSE REASSESS COMMENT FT1
Phenobarbital 600mg in 100ml prepared as per MD miller and pharmacy. Medication infusing at this time. Pt placed on 1:1 observation for alcohol withdrawal, pt remains restless and tremulous. Pt changed, cleaned, and repositioned. Safety measures in place, PCA remains at bedside for 1:1 observation Phenobarbital 600mg in 100ml prepared as per MD miller and pharmacy. Medication infusing at this time. Pt placed on 1:1 observation for alcohol withdrawal, pt remains restless and tremulous. Pt changed, cleaned, and repositioned. Pt remains on CM, sinus tach noted with HR at 105. RR even and unlabored, pt satting at 98% on RA. Safety measures in place, PCA remains at bedside for 1:1 observation

## 2022-07-06 NOTE — CONSULT NOTE ADULT - ASSESSMENT
This note is INCOMPLETE. Please await attestation by the MICU Attending.    50 yo M with reportedly no Past Medical History (although sister Elzbieta @ [phone] states he drinks alcohol frequently) that was brought in by EMS from home for AMS. In the ED, concern for alcohol withdrawal given reported significant alcohol use history with negative serum alcohol. Patient with tremors and tongue fasciculations, AAOx2 with confusion and possible hallucinations. Patient given 2 mg Ativan. MICU consulted for alcohol withdrawal.     #Alcohol Withdrawal  - Patient appears to be withdrawing from alcohol with tremors, hallucinations Unclear exactly when last drink was or if patient has history of withdrawal or seizures  - S/p 2 mg Ativan in ED  - Recommend high risk CIWA with high risk Ativan taper  - Obtain further collateral if possible  - Seizure precautions    This patient does not currently require care in the MICU. Please call with any questions and reconsult as needed.

## 2022-07-06 NOTE — CHART NOTE - NSCHARTNOTEFT_GEN_A_CORE
: Cheng Martin Dr. Narasimhan    INDICATION: Rule Out Shock    PROCEDURE:  [X] LIMITED ECHO  [X] LIMITED CHEST  [ ] LIMITED RETROPERITONEAL  [ ] LIMITED ABDOMINAL  [ ] LIMITED DVT  [ ] NEEDLE GUIDANCE VASCULAR  [ ] NEEDLE GUIDANCE THORACENTESIS  [ ] NEEDLE GUIDANCE PARACENTESIS  [ ] NEEDLE GUIDANCE PERICARDIOCENTESIS  [ ] OTHER    FINDINGS:  Lungs: A-line predominant pattern in anterior lung fields bilaterally. Lung sliding present. No pleural effusions. No noted consolidations  Heart: RV LV grossly normal function.  IVC narrow.     INTERPRETATION:  No evidence of cardiogenic shock. No consolidations visualized. IVC narrow, likely volume down.         Images uploaded on [employer] : Cheng Martin Dr. Narasimhan    INDICATION: Rule Out Shock    PROCEDURE:  [X] LIMITED ECHO  [X] LIMITED CHEST  [ ] LIMITED RETROPERITONEAL  [ ] LIMITED ABDOMINAL  [ ] LIMITED DVT  [ ] NEEDLE GUIDANCE VASCULAR  [ ] NEEDLE GUIDANCE THORACENTESIS  [ ] NEEDLE GUIDANCE PARACENTESIS  [ ] NEEDLE GUIDANCE PERICARDIOCENTESIS  [ ] OTHER    FINDINGS:  Lungs: A-line predominant pattern in anterior lung fields bilaterally. Lung sliding present. No pleural effusions. No noted consolidations  Heart: RV LV grossly normal function.  IVC narrow.     INTERPRETATION:  No evidence of cardiogenic shock. No consolidations visualized. IVC narrow, likely volume down.         Images uploaded on [employer]    Attending note:  At bedside for procedure. Agree with above.  FAMILIA Horne DO, FCCP

## 2022-07-06 NOTE — ED ADULT NURSE NOTE - OBJECTIVE STATEMENT
Pt received to room 24. Pt is A&Ox1-2, oriented to self. As per triage note, family states that pt left the home for 3 hours and was acting abnormal with inappropriate statements. Pt appears confused, pt appears dirty with bruises noted to b/l arms, states he is unaware where he got the bruises. Denies any alcohol, drug use, however states that he took nyquil at 2100 because he has not slept. Pupils dilated, pt appears to be tremulous. Denies any SI/HI. Denies any visual/tactile/auditory hallucinations, denies any headache, CP, SOB, dizziness. Denies any pmhx. RR even and unlabored, pt placed on CM. IV access established with 20G to the L AC, labs collected and sent as per MD orders. Stretcher locked and in lowest position, pt oriented to call bell, pt advised not to get up without assistance. Safety measures in place

## 2022-07-06 NOTE — ED PROVIDER NOTE - CRITICAL CARE ATTENDING CONTRIBUTION TO CARE
Upon my evaluation, this patient had a high probability of imminent or life-threatening deterioration due to ALCOHOL WITHDRAWAL which required my direct attention, intervention, and personal management.  The patient has a  medical condition that impairs one or more vital organ systems.  Frequent personal assessment and adjustment of medical interventions was performed.      I have personally provided 35 minutes of critical care time exclusive of time spent on separately billable procedures. Time includes review of laboratory data, radiology results, discussion with consultants, patient and family; monitoring for potential decompensation, as well as time spent retrieving data and reviewing the chart and documenting the visit. Interventions were performed as documented above.

## 2022-07-06 NOTE — H&P ADULT - NSHPPHYSICALEXAM_GEN_ALL_CORE
T(C): 37.6 (07-06-22 @ 12:00), Max: 37.6 (07-06-22 @ 12:00)  HR: 109 (07-06-22 @ 11:30) (93 - 115)  BP: 123/83 (07-06-22 @ 11:00) (117/78 - 150/138)  RR: 17 (07-06-22 @ 11:30) (16 - 20)  SpO2: 100% (07-06-22 @ 11:30) (98% - 100%)    ***  CONSTITUTIONAL: Well groomed, no apparent distress    EYES: PERRLA and symmetric, EOMI, No conjunctival or scleral injection, non-icteric    ENMT: Oral mucosa with moist membranes. No external nasal lesions; nasal mucosa not inflamed; normal dentition; no pharyngeal injection or exudates. Otoscopic exam with normal tympanic membranes; no gross hearing impairment noted.  	NECK: Supple, symmetric and without tracheal deviation; thyroid gland not enlarged and without palpable masses    RESPIRATORY: No respiratory distress, no use of accessory muscles; CTA b/l, no wheezes, rales or rhonchi, no dullness or hyperresonance to percussion, no tactile fremitus, no subcutaneous emphysema    CARDIOVASCULAR: RRRR, +S1S2, no murmurs, no rubs, no gallops; no JVD; no peripheral edema  	Vascular: no carotid bruits; no abdominal bruit; carotid pulse palpable, radial pulse palpable, femoral pulse palpable, dorsalis pedis pulse palpable, posterior tibialis pulse palpable    GASTROINTESTINAL: Soft, non tender, non distended, no rebound, no guarding; No palpable masses; no hepatosplenomegaly; no hernia palpated;  	Rectal: normal sphincter tone and no masses palpated; stool negative for blood    GENITOURINARY:  	MALE: Normal appearing external genitalia, no penile lesion; no palpable testicular or scrotal mass; prostate not enlarged and without palpable nodule   	Breasts: Breasts symmetric, no nipple discharge; palpation without masses, lumps, or focal tenderness    	FEMALE: Normal appearing external genitalia, no vaginal discharge or lesion noted; examination of urethra with no abnormalities; bladder without fullness or tenderness on palpation; cervix visualized, without lesion or discharge; palpation of uterus and adnexa without tenderness or mass   	Breasts: Breasts symmetric, no nipple discharge; palpation without masses, lumps, or focal tenderness    LYMPHATIC: No cervical LAD or tenderness; no axillary LAD or tenderness; no inguinal LAD or tenderness    MUSCULOSKELETAL: Normal gait and station; no digital clubbing or cyanosis; examination of the (head/neck, spine/ribs/pelvis, RUE, LUE, RLE, LLE) without misalignment, normal range of motion without pain, no spinal tenderness, normal muscle strength/tone    SKIN: No rashes or ulcers noted; no subcutaneous nodules or induration palpable    NEUROLOGIC: CN II-XII intact; normal reflexes in upper and lower extremities, sensation intact in upper and lower extremities b/l to light touch; Babinski down b/l; no Kernig’s sign, no Brudzinski’s sign    PSYCHIATRIC: Appropriate insight/judgment; A+O x 3, mood and affect appropriate, recent/remote memory intact T(C): 37.6 (07-06-22 @ 12:00), Max: 37.6 (07-06-22 @ 12:00)  HR: 109 (07-06-22 @ 11:30) (93 - 115)  BP: 123/83 (07-06-22 @ 11:00) (117/78 - 150/138)  RR: 17 (07-06-22 @ 11:30) (16 - 20)  SpO2: 100% (07-06-22 @ 11:30) (98% - 100%)    CONSTITUTIONAL: Well groomed, no apparent distress. Not arousable to voice or noxious stimuli (received phenobarbital prior to exam)  EYES: PERRLA and symmetric, EOMI, No conjunctival or scleral injection, non-icteric  ENMT: Oral mucosa with moist membranes.  NECK: Supple, symmetric and without tracheal deviation\  RESPIRATORY: No respiratory distress, no use of accessory muscles; CTA b/l, no wheezes, rales or rhonchi  CARDIOVASCULAR: RRRR, +S1S2, no murmurs, no rubs, no gallops; no JVD; no peripheral edema  GASTROINTESTINAL: Soft, non distended, no palpable masses  	Rectal: negative for blood on BARBARA  SKIN: No rashes or ulcers noted  NEUROLOGIC: Not arousable to voice or noxious stimuli  PSYCHIATRIC: Unable to assess

## 2022-07-06 NOTE — CONSULT NOTE ADULT - SUBJECTIVE AND OBJECTIVE BOX
GI Consult Note    Chief Complaint:  Patient is a 51y old  Male who presents with a chief complaint of ETOH Withdrawal (06 Jul 2022 09:43)    HPI: Pt is a 52 yo M w/ PMHx alcohol use disorder, admitted to MICU for AMS in setting of recent alcohol cessation. Pt admitted to MICU for withdrawal requiring phenobarbitol and precedex gtt. On admission, pt also found to have drop in Hgb from baseline of 12.3 to 7.5. Patient hemodynamically stable for most part with BPs in 120s/70s, but briefly hypotensive to 80s/50s. Patient too sedated to give history. Per sister Elzbieta ([phone]), patient has no known hx of GI issues, no known history of GI bleed. Four days ago, pt's sister alone came to clean his house and found bright red blood in his toilet and sink. Patient has had no BMs today since being admitted. S/p IV PPI, octreotide, 2L IVF    Allergies:  No Known Allergies    Home Medications:    Hospital Medications:  chlorhexidine 4% Liquid 1 Application(s) Topical <User Schedule>  dexMEDEtomidine Infusion 0.2 MICROgram(s)/kG/Hr IV Continuous <Continuous>  folic acid 1 milliGRAM(s) Oral daily  multivitamin 1 Tablet(s) Oral daily  octreotide  Injectable 50 MICROGram(s) IV Push once  pantoprazole  Injectable 80 milliGRAM(s) IV Push once  PHENobarbital Injectable 130 milliGRAM(s) IV Push every 15 minutes PRN  potassium chloride  10 mEq/100 mL IVPB 10 milliEquivalent(s) IV Intermittent every 1 hour  thiamine IVPB 500 milliGRAM(s) IV Intermittent every 8 hours      PMHX/PSHX:  alcohol use disorder    Family history: no known hx     Social History: lives alone    ROS: no     General:  No wt loss, fevers, chills, night sweats, fatigue,   Eyes:  Good vision, no reported pain  ENT:  No sore throat, pain, runny nose, dysphagia  CV:  No pain, palpitations, hypo/hypertension  Resp:  No dyspnea, cough, tachypnea, wheezing  GI:  No pain, No nausea, No vomiting, No diarrhea, No constipation, No weight loss, No fever, No pruritis, No rectal bleeding, No tarry stools, No dysphagia,  :  No pain, bleeding, incontinence, nocturia  Muscle:  No pain, weakness  Neuro:  No weakness, tingling, memory problems  Psych:  No fatigue, insomnia, mood problems, depression  Endocrine:  No polyuria, polydipsia, cold/heat intolerance  Heme:  No petechiae, ecchymosis, easy bruisability  Skin:  No rash, tattoos, scars, edema      PHYSICAL EXAM:   Vital Signs:  Vital Signs Last 24 Hrs  T(C): 38.1 (06 Jul 2022 16:00), Max: 38.1 (06 Jul 2022 16:00)  T(F): 100.5 (06 Jul 2022 16:00), Max: 100.5 (06 Jul 2022 16:00)  HR: 106 (06 Jul 2022 17:00) (92 - 115)  BP: 108/73 (06 Jul 2022 17:00) (85/52 - 150/138)  BP(mean): 85 (06 Jul 2022 17:00) (60 - 109)  RR: 23 (06 Jul 2022 17:00) (16 - 23)  SpO2: 99% (06 Jul 2022 17:00) (95% - 100%)  Daily     Daily     GENERAL:  thin, seadted  HEENT:  NC/AT,  conjunctivae clear and pink, sclera -anicteric  CHEST:  Full & symmetric excursion, no increased effort, breath sounds clear  HEART:  Regular rhythm, no edema  ABDOMEN:  Soft, non-tender, non-distended, normoactive bowel sounds, no masses ,no hepato-splenomegaly, no signs of chronic liver disease  EXTEREMITIES:  no cyanosis,clubbing or edema  SKIN:  No rash/erythema/ecchymoses/petechiae/wounds/abscess/warm/dry  NEURO:  AAOx0  Rectal: no stool on rectal exam    LABS:                        7.8    4.98  )-----------( 106      ( 06 Jul 2022 11:48 )             23.4     07-06    138  |  94<L>  |  6<L>  ----------------------------<  107<H>  2.8<LL>   |  33<H>  |  0.58    Ca    9.7      06 Jul 2022 11:48  Phos  2.8     07-06  Mg     1.00     07-06    TPro  6.5  /  Alb  3.5  /  TBili  1.3<H>  /  DBili  x   /  AST  61<H>  /  ALT  23  /  AlkPhos  118  07-06    LIVER FUNCTIONS - ( 06 Jul 2022 11:48 )  Alb: 3.5 g/dL / Pro: 6.5 g/dL / ALK PHOS: 118 U/L / ALT: 23 U/L / AST: 61 U/L / GGT: x           PT/INR - ( 06 Jul 2022 11:48 )   PT: 12.2 sec;   INR: 1.05 ratio         PTT - ( 06 Jul 2022 11:48 )  PTT:24.9 sec    Amylase Serum--      Lipase serum46       Ammonia--      Imaging:  reviewed

## 2022-07-06 NOTE — H&P ADULT - ATTENDING COMMENTS
Admit to micu. Seen and examined with team on rounds. Critically ill with ETOH withdrawal syndrome. Severe agitation despite medications. Close follow up of CIWA and medications as needed. Supportive care.

## 2022-07-06 NOTE — ED ADULT NURSE NOTE - NSFALLRSKPASTHIST_ED_ALL_ED
Chief Complaint   Patient presents with   • Sore Throat     room 7   • Diarrhea       Katie Cardoza is a 15 year old female being seen 2 concerns    #1 she is coming in with recurrent diarrhea. She is here with her mother and states that they have diarrhea symptoms probably once a week he'll come in 2 or 3 days and she is better than it again 2 or 3 days. Use Imodium with good response but because of the diarrhea she is missing a significant amount of school. No fevers or chills no ill contacts or travel history. She has a little bit of bloating cramping pain before the diarrhea she has no change in her periods. The diarrhea never shows any blood or black stool. She has had diarrhea in the past we thought it was probably related to her anxiety and chronic constipation. She had been on MiraLAX which she is not using at this time.  #2 she continues to have discomfort into the throat. She was seen about a week ago given a Z-Charles she's not sure if this was helpful or not but she describes and others sore throat. No fevers or chills no cold or cough symptoms. She does use a asthma inhaler and states that she does rinse after usage.    Patient Active Problem List   Diagnosis   • Renal agenesis   • Mild persistent asthma   • Allergic rhinitis due to allergen   • Chronic ethmoidal sinusitis   • Social anxiety disorder   • Agoraphobia       Current Outpatient Prescriptions   Medication Sig Dispense Refill   • FLUoxetine (PROZAC) 20 MG capsule TAKE 1 CAPSULE BY MOUTH DAILY FOLLOWING THE 10MG CAPSULES 30 capsule 1   • beclomethasone diprop (QVAR) 80 MCG/ACT inhaler Inhale 1 puff into the lungs 2 times daily. 8.7 g 12   • albuterol (PROAIR HFA) 108 (90 BASE) MCG/ACT inhaler Inhale 2 puffs into the lungs every 4 hours as needed for Shortness of Breath or Wheezing. 2 Inhaler 2   • fluticasone (FLONASE) 50 MCG/ACT nasal spray Spray 2 sprays in each nostril daily. 3 Bottle 3   • montelukast (SINGULAIR) 5 MG chewable tablet Chew 1  tablet by mouth nightly. 90 tablet 3   • albuterol (VENTOLIN) (2.5 MG/3ML) 0.083% nebulizer solution Take 3 mLs by nebulization every 4 hours as needed for Wheezing. 75 mL 1   • omeprazole (PRILOSEC) 20 MG capsule GIVE \"BECCA\" ONE CAPSULE BY MOUTH TWICE DAILY 180 capsule 0   • MELATONIN PO Take  by mouth.     • ibuprofen (MOTRIN) 200 MG tablet Take 200 mg by mouth every 6 hours as needed.       No current facility-administered medications for this visit.        Allergies as of 04/06/2017 - Yousuf as Reviewed 04/06/2017   Allergen Reaction Noted   • Amoxicillin HIVES    • Cefdinir HIVES    • Omnicef RASH    • Pertussis vaccine HIVES 03/29/2014   • Sulfa drugs cross reactors HIVES        Social History     Social History   • Marital status: Single     Spouse name: N/A   • Number of children: N/A   • Years of education: N/A     Social History Main Topics   • Smoking status: Never Smoker   • Smokeless tobacco: Never Used   • Alcohol use No   • Drug use: No   • Sexual activity: No     Other Topics Concern   • None     Social History Narrative       REVIEW OF SYSTEMS: Patient denies any vision changes no hearing changes no speech problems she does have a sore throat but no difficulty swallowing. She denies any wheezing or cough. No abdominal pain. She did have diarrhea 3 times already today. She describes it as loose, no blood or black stools    PHYSICAL EXAM:    Vitals:    04/06/17 1054   BP: 128/74   Pulse: 80   Resp: 16   Temp: 98.2 °F (36.8 °C)   TempSrc: Oral   Weight: 61.2 kg   Height: 5' 3\" (1.6 m)     GENERAL: Well appearing female in no acute respiratory distress.  HEENT: Neck supple, no thyroid enlargement, no submandibular or supraclavicular adenopathy. Posterior pharynx does have a little bit of redness I do not see any exudate in the tonsils are remainder skin. No signs of thrush. Ears are clear bilaterally  CARDIOVASCULAR: RRR (regular rate and rhythm), no murmurs, rubs, gallops.   LUNG: CTA (clear to  auscultation), no wheezes, rales, good air movement, no use of  extra respiratory muscles.   ABDOMEN: Soft, non tender, normal bowel sounds, no enlargement or spleen or liver. Abdomen was soft there is no tenderness no masses no guarding or rebound. She had some vague discomfort into her right upper quadrant but not certain this was true.  EXTREMITIES: No edema, no rashes     ASSESSMENT AND PLAN:  Chronic diarrhea  I'm not sure if she's having true diarrhea versus functional diarrhea related to her anxiety and not wanting to go to school. We will do appropriate blood work looking for potential causes such as gallbladder liver or pancreas. He also will do a C. difficile as she's been on a few antibiotics recently. I've asked the patient and her mom to restart her MiraLAX to see if this will help firm up her stools a little bit. If she's not improving or blood work is abnormal we will need to consider further testing possible imaging or referral to GI  - CBC & Auto Differential; Future  - Hepatic Function Panel; Future  - C Difficile by PCR; Future  - Amylase Level; Future  - Lipase Level; Future  - Thyroid Stimulating Hormone Reflex; Future    Pharyngitis, unspecified etiology  We discussed her pharyngitis that appears to be more of a viral-type pharyngitis. We did discuss that this could be thrush related to her steroid inhaler. I would like to monitor this for the next 2 or 3 days and they will contact me Monday if it's continuous. They will call sooner if there is any fevers. She'll make sure that she's gargling appropriately after use of her inhalers      Orders Placed This Encounter   • CBC & Auto Differential   • Hepatic Function Panel   • C Difficile by PCR   • Amylase Level   • Lipase Level   • Thyroid Stimulating Hormone Reflex       Return if symptoms worsen or fail to improve.   unable to determine

## 2022-07-06 NOTE — CONSULT NOTE ADULT - SUBJECTIVE AND OBJECTIVE BOX
CHIEF COMPLAINT:    HPI:  50 yo M with reportedly no Past Medical History (although sister Elzbieta @ [phone] states he drinks alcohol frequently) that was brought in by EMS from home for AMS. Per pt, he is unsure why he is here in hospital. states he took some nyquil tonight to sleep and called 911 because he saw people in his house and they were not letting him leave and he called 911. States he has been seeing mice around as well when his sister states there were none. He denies any physical pain at this time, no fever, chills, N/V/D, urinary symptoms. Has been eating and drinking well otherwise per pt.   Per sister, states that 2 wks ago, pt was fired from job for allegedly drinking alcohol on job. Since being fired, he wanted to stop drinking so has stopped last few days. Tonight was talking to sister and was confused, stating people were after him and she did not see anyone there. Sister thinks pt was hallucinating from alcohol withdrawal. She state pt has no other medical issues otherwise. History obtained as per ED note as patient unable to provide history.    In the ED, concern for alcohol withdrawal given reported significant alcohol use history with negative serum alcohol. Patient with tremors and tongue fasciculations, AAOx2 with confusion and possible hallucinations. Patient given 2 mg Ativan. MICU consulted for alcohol withdrawal.       PAST MEDICAL & SURGICAL HISTORY:  No known prior medical history    FAMILY HISTORY:      SOCIAL HISTORY:  Reported history of significant alcohol use. Unknown history of withdrawal or seizures.    Allergies    No Known Allergies    Intolerances        HOME MEDICATIONS:    REVIEW OF SYSTEMS:  Constitutional:   Eyes:  ENT:  CV:  Resp:  GI:  :  MSK:  Integumentary:  Neurological:  Psychiatric:  Endocrine:  Hematologic/Lymphatic:  Allergic/Immunologic:  [ ] All other systems negative  [X ] Unable to assess ROS because of baseline mental status    OBJECTIVE:  ICU Vital Signs Last 24 Hrs  T(C): 36.7 (06 Jul 2022 01:41), Max: 36.7 (06 Jul 2022 01:41)  T(F): 98 (06 Jul 2022 01:41), Max: 98 (06 Jul 2022 01:41)  HR: 93 (06 Jul 2022 03:50) (93 - 101)  BP: 128/91 (06 Jul 2022 03:50) (117/78 - 128/91)  BP(mean): --  ABP: --  ABP(mean): --  RR: 16 (06 Jul 2022 03:50) (16 - 18)  SpO2: 98% (06 Jul 2022 03:50) (98% - 100%)        CAPILLARY BLOOD GLUCOSE        PHYSICAL EXAM:  T(C): 36.7 (07-06-22 @ 01:41), Max: 36.7 (07-06-22 @ 01:41)  HR: 93 (07-06-22 @ 03:50) (93 - 101)  BP: 128/91 (07-06-22 @ 03:50) (117/78 - 128/91)  RR: 16 (07-06-22 @ 03:50) (16 - 18)  SpO2: 98% (07-06-22 @ 03:50) (98% - 100%)  GENERAL: somewhat disheveled   HEAD:  Atraumatic, Normocephalic  EYES: EOMI, conjunctiva and sclera clear  NECK: Supple  CHEST/LUNG: unlabored respirations  ABDOMEN: no visible distention  EXTREMITIES:  No clubbing, cyanosis, or edema  PSYCH: AAOx2 but confused, possible hallucinations  NEUROLOGY: tremulous   SKIN: No rashes or lesions    HOSPITAL MEDICATIONS:  MEDICATIONS  (STANDING):  LORazepam   Injectable 2 milliGRAM(s) IV Push once  potassium chloride    Tablet ER 40 milliEquivalent(s) Oral once  potassium chloride  10 mEq/100 mL IVPB 10 milliEquivalent(s) IV Intermittent once    MEDICATIONS  (PRN):      LABS:                        7.5    5.81  )-----------( 104      ( 06 Jul 2022 03:27 )             21.7     07-06    134<L>  |  91<L>  |  11  ----------------------------<  130<H>  3.2<L>   |  31  |  0.87    Ca    10.4      06 Jul 2022 03:27    TPro  6.8  /  Alb  3.9  /  TBili  1.2  /  DBili  x   /  AST  69<H>  /  ALT  26  /  AlkPhos  139<H>  07-06          Venous Blood Gas:  07-06 @ 03:27  7.49/47/27/36/34.8  VBG Lactate: 1.8      MICROBIOLOGY:     RADIOLOGY:  [ ] Reviewed and interpreted by me    EKG:

## 2022-07-06 NOTE — CONSULT NOTE ADULT - ASSESSMENT
Pt is a 52 yo M w/ PMHx alcohol use disorder, admitted to MICU for AMS in setting of recent alcohol cessation. GI consulted for drop in Hgb from patient's baseline near 12 to 7.5.    Impression  #Alcohol withdrawal: on phenobarb and precedex prn, management per MICU team  #Anemia: drop in Hgb from 12 in June to 7.5. Per sister, bright red blood seen in patient's toilet bowl at home    Recommendations  #Anemia  - trend CBC, CMP, INR  - 2 large bore IVs, keep active T&S  - transfuse for Hgb<7        GI will continue to follow.     All recommendations are tentative until note is attested by attending.     Kaelyn Adkins, PGY5  Gastroenterology/Hepatology Fellow  Available on [employer] Teams  [phone] ([employer] Short Range Pager)  [phone] (Long Range Pager)    After 5pm, please contact the on-call GI fellow. [phone]    Pt is a 50 yo M w/ PMHx alcohol use disorder, admitted to MICU for AMS in setting of recent alcohol cessation. GI consulted for drop in Hgb from patient's baseline near 12 to 7.5.    Impression  #Acute encephalopathy: on phenobarb and precedex prn for possible alcohol withdrawal, management per MICU team  #Anemia: drop in Hgb from 12 in June to 7.5. Per sister, bright red blood seen in patient's toilet bowl at home    Recommendations  #Anemia, lower GIB vs brisk upper GIB, although UGIB less likely given hemodynamically stable and no BMs since admission  - trend CBC, CMP, INR  - 2 large bore IVs, keep active T&S  - protonix 40 mg IV bid  - transfuse for Hgb<7  - will hold off on intervention for now as patient too sedated to take bowel prep    GI will continue to follow.     All recommendations are tentative until note is attested by attending.     Kaelyn Adkins, PGY5  Gastroenterology/Hepatology Fellow  Available on [employer] Teams  [phone] ([employer] Short Range Pager)  [phone] (Long Range Pager)    After 5pm, please contact the on-call GI fellow. [phone]

## 2022-07-06 NOTE — PATIENT PROFILE ADULT - FALL HARM RISK - HARM RISK INTERVENTIONS
Assistance with ambulation/Assistance OOB with selected safe patient handling equipment/Communicate Risk of Fall with Harm to all staff/Monitor for mental status changes/Monitor gait and stability/Move patient closer to nurses' station/Reinforce activity limits and safety measures with patient and family/Reorient to person, place and time as needed/Tailored Fall Risk Interventions/Toileting schedule using arm’s reach rule for commode and bathroom/Use of alarms - bed, chair and/or voice tab/Visual Cue: Yellow wristband and red socks/Bed in lowest position, wheels locked, appropriate side rails in place/Call bell, personal items and telephone in reach/Instruct patient to call for assistance before getting out of bed or chair/Non-slip footwear when patient is out of bed/[place] to call system/Physically safe environment - no spills, clutter or unnecessary equipment/Purposeful Proactive Rounding/Room/bathroom lighting operational, light cord in reach

## 2022-07-06 NOTE — H&P ADULT - HISTORY OF PRESENT ILLNESS
52 yo M with reportedly no Past Medical History (although sister Elzbieta @ [phone] states he drinks alcohol frequently) that was brought in by EMS from home for  AMS. Per pt, he is unsure why he is here in hospital. states he took some nyquil tonight to sleep and called 911 because he saw people in his house and they were not   letting him leave and he called 911. States he has been seeing mice around as well when his sister states there were none. He denies any physical pain at this time, no   fever, chills, N/V/D, urinary symptoms. Has been eating and drinking well otherwise per pt.     Per sister, states that 2 wks ago, pt was fired from job for allegedly drinking alcohol on job. Since being fired, he wanted to stop drinking so has stopped last few days. Tonight was talking to sister and was confused, stating people were after him and she did not see anyone there. Sister thinks pt was hallucinating from alcohol withdrawal. She state pt has no other medical issues otherwise. History obtained as per ED note as patient unable to provide history.   Mr. Syeda Johnson is a 50 yo M w/ PMH alcohol use, admitted from the ED 7/6 for AMS.    Per ED report, pt reported recent visual hallucinations prompting him to call 911. AMS on arrival to the ED, disoriented to situation.  Denied pain, fever, chills, N/V/D, urinary symptoms, loss of appetite. Sister reported that pt was fired for drinking alcohol at work ~2 weeks ago. He has since stopped drinking (last drink ***)     No other known PMH.        Mr. Syeda Johnson is a 50 yo M w/ PMH alcohol use, admitted from the ED 7/6 for AMS. Collateral info from chart and pt's sister Elzbieta ([phone]).    Pt had 1 day of AMS. Confusion, visual hallucinations, anxiety. Pt called his sister claiming there was a person in his apartment, but no one was there. Pt also reported seeing mice which were not there. No reports of increased diaphoresis, fever, palpitations, chest pain, SOB.     Of note, pt was previously a  working night shifts. ~10 days ago, while at work, he suffered a fall and went to the ED, discharged home. After being discharged home, he received notice that he was being fired for being intoxicated at work.    Per ED report, pt reported recent visual hallucinations prompting him to call 911. AMS on arrival to the ED, disoriented to situation.  Denied pain, fever, chills, N/V/D, urinary symptoms, loss of appetite. Sister reported that pt was fired for drinking alcohol at work ~2 weeks ago. He has since stopped drinking (last drink ***)     No other known PMH.        Mr. Syeda Johnson is a 50 yo M w/ PMH alcohol use, admitted from the ED 7/6 for AMS. Collateral info from chart and pt's sister Elzbieta ([phone]).    Pt had 1 day of AMS. Confusion, visual hallucinations, anxiety. Pt called his sister claiming there was a person in his home, but no one was there. Pt also reported seeing mice which were not there. No reports of increased diaphoresis, fever, palpitations, chest pain, SOB.     Pt called 911 due to perceiving a person in his home, and was brought to the ED. Observed to be disoriented, restless, tremulous. There he was normotensive & afebrile w/ mild tachycardia to 101. UTox negative for alcohol. CT head normal. Received 2 mg ativan x2 in the ED, continued to be restless & confused. Found to have Hb 7.5 (repeat 7.8), no reported rectal bleeding or bloody vomiting. Last Hb 6/23 was 12.3.    Of note, pt was previously a  working night shifts. On 6/23, while at work, he suffered a fall and went to the ED, discharged home. After being discharged home, he received notice that he was being fired for being intoxicated at work. Per sister, he was motivated to stop drinking after losing his job and had his last drink 5 days ago.     Per sister, pt has a "drinking problem" and drinks beer & vodka (unknown # of drinks) to help him fall asleep during the day. She states that he has drank like this for ~1 year, prior to 1 year go he had lower alcohol intake.    No other known medical hx. He does not take any medications nor any substances other than alcohol & tobacco (smokes cigarettes, cigars). His sister states that he was hospitalized at [facility] ~2 years ago for "tuberculosis" but does not recall any details.

## 2022-07-06 NOTE — ED PROVIDER NOTE - NEUROLOGICAL, MLM
Alert and oriented, no focal deficits, no motor or sensory deficits. tremors, tongue fasiculations, gait unsteady, no facial drooping

## 2022-07-06 NOTE — PATIENT PROFILE ADULT - NSPROGENSOURCEINFO_GEN_A_NUR
lacks capacity and has no surrogate decision maker patient/lacks capacity and has no surrogate decision maker

## 2022-07-06 NOTE — ED ADULT NURSE REASSESSMENT NOTE - NS ED NURSE REASSESS COMMENT FT1
Pt remains confused and restless. Pt with tongue fasciculations, remains tremulous. Pt changed and repositioned. Additional IV access established with 20G to the L AC, labs collected. Pt mumbling with inappropriate comments. Pt unaware that he remains in the hospital, attempted to reorient. RR even and unlabored, VSS at this time. Stretcher locked and in lowest position, call bell in reach, safety measures in place Pt remains confused and restless. Pt with tongue fasciculations, remains tremulous. Pt changed and repositioned. Additional IV access established with 20G to the L AC, labs collected. Pt mumbling with inappropriate comments, pt presents with auditory hallucinations. Pt unaware that he remains in the hospital, attempted to reorient. RR even and unlabored, VSS at this time. Stretcher locked and in lowest position, call bell in reach, safety measures in place

## 2022-07-06 NOTE — H&P ADULT - ASSESSMENT
Mr. Syeda Johnson is a 52 yo M w/ PMH alcohol use, admitted from the ED 7/6 for AMS.    ===========NEURO============  ***    ===========CV===========        ===========PULM==============    ===========GI==============  Diet:  Bowel regiment:    ===========/RENAL==============  UO:   [place]:     ===========ID==============    ===========HEME==============      ===========ENDO==============      ===========PPX==============  DVT ppx:  Lines:  Decubiti:  Code status:     Mr. Syeda Johnson is a 52 yo M w/ PMH alcohol use, admitted from the ED 7/6 for AMS.    ===========NEURO============  #EtOH withdrawal  - UTox negative for alcohol, pt family reports last drink was 5 days ago, intoxication unlikely.  - Does report fall recently (~2 weeks ago), however CTH negative in ED on 7/6  - Restless, tremulous in the ED even after 2x 2mg ativan  - Loaded w/ 600 mg phenobarb IV, somnolent afterward  - continue IV phenobarb prn  - q1 vital checks  - thiamine/folate    ===========CV===========  HDS. No active issues    ===========PULM==============  No respiratory distress. Sating well RA. No active issues    ===========GI==============  Diet: NPO except meds    #Alcohol use disorder  - Unclear amount of drinking, however per report drinks beer & vodka daily.  - No known hx of liver disease  - AST 69, ALT 26. . TBili 1.2  - Mg 0.9 on arrival. Repleted 4 g    ===========/RENAL==============  No active issues    ===========ID==============  Afebrile, WBC 4.98. No active issues  UA/UCx pending    ===========HEME==============  #Anemia  - Hb 7.5, repeat 7.8. Prev Hb 2 weeks ago was 12.3  - No known overt bleeding such as BRBPR, bloody vomitus. BARBARA negative    ===========ENDO==============  BG wnl, no hx DM. No active issues    ===========PPX==============  DVT ppx: Hold for now in setting of anemia  Decubiti: None  Code status: Full code     Mr. Syeda Johnson is a 50 yo M w/ PMH alcohol use, admitted from the ED 7/6 for AMS.    ===========NEURO============  #EtOH withdrawal  - UTox negative for alcohol, pt family reports last drink was 5 days ago, intoxication unlikely.  - Does report fall recently (~2 weeks ago), however CTH negative in ED on 7/6  - Restless, tremulous in the ED even after 2x 2mg ativan  - Loaded w/ 600 mg phenobarb IV, somnolent afterward  - continue IV phenobarb prn  - q1 vital checks  - thiamine/folate    ===========CV===========  HDS. No active issues    ===========PULM==============  No respiratory distress. Sating well RA. No active issues    ===========GI==============  Diet: NPO except meds    #Alcohol use disorder  - Unclear amount of drinking, however per report drinks beer & vodka daily.  - No known hx of liver disease  - AST 69, ALT 26. . TBili 1.2. INR 1.05. No evidence of acute alcohol hepatitis  - Mg 0.9 on arrival. Repleted 4 g    ===========/RENAL==============  #Hypokalemia  - K 3.2 on admission, 2.8 on arrival to ICU  - Repleted 60 mg total IV, will recheck and replete further as needed    ===========ID==============  Afebrile, WBC 4.98. No active issues  UA/UCx pending    ===========HEME==============  #Anemia  - Hb 7.5, repeat 7.8. Prev Hb 2 weeks ago was 12.3  - No known overt bleeding such as BRBPR, bloody vomitus. BARBARA negative    ===========ENDO==============  BG wnl, no hx DM. No active issues    ===========PPX==============  DVT ppx: Hold for now in setting of anemia  Decubiti: None  Code status: Full code

## 2022-07-06 NOTE — ED PROVIDER NOTE - OBJECTIVE STATEMENT
52 yo M with reportedly no Past Medical History that was brought in by EMS from home for AMS. Per pt, he is unsure why he is here, states he took some nyquil tonight to sleep but woke up in ED 50 yo M with reportedly no Past Medical History (although sister Elzbieta @ [phone] states he drinks alcohol frequently) that was brought in by EMS from home for AMS. Per pt, he is unsure why he is here in hospital. states he took some nyquil tonight to sleep and called 911 because he saw people in his house and they were not letting him leave and he called 911. States he has been seeing mice around as well when his sister states there were none. He denies any physical pain at this time, no fever, chills, N/V/D, urinary symptoms. Has been eating and drinking well otherwise per pt.   Per sister, states that 2 wks ago, pt was fired from job for allegedly drinking alcohol on job. Since being fired, he wanted to stop drinking so has stopped last few days. Tonight was talking to sister and was confused, stating people were after him and she did not see anyone there. Sister thinks pt was hallucinating from alcohol withdrawal. She state pt has no other medical issues otherwise.

## 2022-07-07 DIAGNOSIS — D58.2 OTHER HEMOGLOBINOPATHIES: ICD-10-CM

## 2022-07-07 DIAGNOSIS — Z29.9 ENCOUNTER FOR PROPHYLACTIC MEASURES, UNSPECIFIED: ICD-10-CM

## 2022-07-07 DIAGNOSIS — R50.9 FEVER, UNSPECIFIED: ICD-10-CM

## 2022-07-07 DIAGNOSIS — R00.0 TACHYCARDIA, UNSPECIFIED: ICD-10-CM

## 2022-07-07 DIAGNOSIS — E87.8 OTHER DISORDERS OF ELECTROLYTE AND FLUID BALANCE, NOT ELSEWHERE CLASSIFIED: ICD-10-CM

## 2022-07-07 DIAGNOSIS — F10.10 ALCOHOL ABUSE, UNCOMPLICATED: ICD-10-CM

## 2022-07-07 LAB
ALBUMIN SERPL ELPH-MCNC: 2.9 G/DL — LOW (ref 3.3–5)
ALBUMIN SERPL ELPH-MCNC: 3.8 G/DL — SIGNIFICANT CHANGE UP (ref 3.3–5)
ALP SERPL-CCNC: 101 U/L — SIGNIFICANT CHANGE UP (ref 40–120)
ALP SERPL-CCNC: 124 U/L — HIGH (ref 40–120)
ALT FLD-CCNC: 20 U/L — SIGNIFICANT CHANGE UP (ref 4–41)
ALT FLD-CCNC: 20 U/L — SIGNIFICANT CHANGE UP (ref 4–41)
AMPHET UR-MCNC: NEGATIVE — SIGNIFICANT CHANGE UP
ANION GAP SERPL CALC-SCNC: 11 MMOL/L — SIGNIFICANT CHANGE UP (ref 7–14)
ANION GAP SERPL CALC-SCNC: 12 MMOL/L — SIGNIFICANT CHANGE UP (ref 7–14)
ANION GAP SERPL CALC-SCNC: 8 MMOL/L — SIGNIFICANT CHANGE UP (ref 7–14)
APPEARANCE UR: CLEAR — SIGNIFICANT CHANGE UP
APTT BLD: 26.8 SEC — LOW (ref 27–36.3)
AST SERPL-CCNC: 44 U/L — HIGH (ref 4–40)
AST SERPL-CCNC: 53 U/L — HIGH (ref 4–40)
BARBITURATES UR SCN-MCNC: POSITIVE
BASOPHILS # BLD AUTO: 0.02 K/UL — SIGNIFICANT CHANGE UP (ref 0–0.2)
BASOPHILS # BLD AUTO: 0.02 K/UL — SIGNIFICANT CHANGE UP (ref 0–0.2)
BASOPHILS NFR BLD AUTO: 0.3 % — SIGNIFICANT CHANGE UP (ref 0–2)
BASOPHILS NFR BLD AUTO: 0.3 % — SIGNIFICANT CHANGE UP (ref 0–2)
BENZODIAZ UR-MCNC: NEGATIVE — SIGNIFICANT CHANGE UP
BILIRUB SERPL-MCNC: 0.9 MG/DL — SIGNIFICANT CHANGE UP (ref 0.2–1.2)
BILIRUB SERPL-MCNC: 1.3 MG/DL — HIGH (ref 0.2–1.2)
BILIRUB UR-MCNC: NEGATIVE — SIGNIFICANT CHANGE UP
BUN SERPL-MCNC: 5 MG/DL — LOW (ref 7–23)
BUN SERPL-MCNC: 7 MG/DL — SIGNIFICANT CHANGE UP (ref 7–23)
BUN SERPL-MCNC: 8 MG/DL — SIGNIFICANT CHANGE UP (ref 7–23)
CALCIUM SERPL-MCNC: 8 MG/DL — LOW (ref 8.4–10.5)
CALCIUM SERPL-MCNC: 8.5 MG/DL — SIGNIFICANT CHANGE UP (ref 8.4–10.5)
CALCIUM SERPL-MCNC: 9 MG/DL — SIGNIFICANT CHANGE UP (ref 8.4–10.5)
CHLORIDE SERPL-SCNC: 94 MMOL/L — LOW (ref 98–107)
CHLORIDE SERPL-SCNC: 97 MMOL/L — LOW (ref 98–107)
CHLORIDE SERPL-SCNC: 99 MMOL/L — SIGNIFICANT CHANGE UP (ref 98–107)
CO2 SERPL-SCNC: 26 MMOL/L — SIGNIFICANT CHANGE UP (ref 22–31)
CO2 SERPL-SCNC: 27 MMOL/L — SIGNIFICANT CHANGE UP (ref 22–31)
CO2 SERPL-SCNC: 31 MMOL/L — SIGNIFICANT CHANGE UP (ref 22–31)
COCAINE METAB.OTHER UR-MCNC: NEGATIVE — SIGNIFICANT CHANGE UP
COLOR SPEC: SIGNIFICANT CHANGE UP
CREAT SERPL-MCNC: 0.66 MG/DL — SIGNIFICANT CHANGE UP (ref 0.5–1.3)
CREAT SERPL-MCNC: 0.69 MG/DL — SIGNIFICANT CHANGE UP (ref 0.5–1.3)
CREAT SERPL-MCNC: 0.83 MG/DL — SIGNIFICANT CHANGE UP (ref 0.5–1.3)
CREATININE URINE RESULT, DAU: 33 MG/DL — SIGNIFICANT CHANGE UP
DIFF PNL FLD: NEGATIVE — SIGNIFICANT CHANGE UP
EGFR: 107 ML/MIN/1.73M2 — SIGNIFICANT CHANGE UP
EGFR: 113 ML/MIN/1.73M2 — SIGNIFICANT CHANGE UP
EGFR: 115 ML/MIN/1.73M2 — SIGNIFICANT CHANGE UP
EOSINOPHIL # BLD AUTO: 0.07 K/UL — SIGNIFICANT CHANGE UP (ref 0–0.5)
EOSINOPHIL # BLD AUTO: 0.1 K/UL — SIGNIFICANT CHANGE UP (ref 0–0.5)
EOSINOPHIL NFR BLD AUTO: 1.2 % — SIGNIFICANT CHANGE UP (ref 0–6)
EOSINOPHIL NFR BLD AUTO: 1.6 % — SIGNIFICANT CHANGE UP (ref 0–6)
GLUCOSE SERPL-MCNC: 106 MG/DL — HIGH (ref 70–99)
GLUCOSE SERPL-MCNC: 111 MG/DL — HIGH (ref 70–99)
GLUCOSE SERPL-MCNC: 117 MG/DL — HIGH (ref 70–99)
GLUCOSE UR QL: NEGATIVE — SIGNIFICANT CHANGE UP
HCT VFR BLD CALC: 23.7 % — LOW (ref 39–50)
HCT VFR BLD CALC: 25.7 % — LOW (ref 39–50)
HCT VFR BLD CALC: 27.3 % — LOW (ref 39–50)
HGB BLD-MCNC: 7.5 G/DL — LOW (ref 13–17)
HGB BLD-MCNC: 8.3 G/DL — LOW (ref 13–17)
HGB BLD-MCNC: 8.9 G/DL — LOW (ref 13–17)
IANC: 3.62 K/UL — SIGNIFICANT CHANGE UP (ref 1.8–7.4)
IANC: 4.38 K/UL — SIGNIFICANT CHANGE UP (ref 1.8–7.4)
IMM GRANULOCYTES NFR BLD AUTO: 0.8 % — SIGNIFICANT CHANGE UP (ref 0–1.5)
IMM GRANULOCYTES NFR BLD AUTO: 0.9 % — SIGNIFICANT CHANGE UP (ref 0–1.5)
INR BLD: 1.05 RATIO — SIGNIFICANT CHANGE UP (ref 0.88–1.16)
KETONES UR-MCNC: NEGATIVE — SIGNIFICANT CHANGE UP
LEUKOCYTE ESTERASE UR-ACNC: NEGATIVE — SIGNIFICANT CHANGE UP
LYMPHOCYTES # BLD AUTO: 0.74 K/UL — LOW (ref 1–3.3)
LYMPHOCYTES # BLD AUTO: 0.87 K/UL — LOW (ref 1–3.3)
LYMPHOCYTES # BLD AUTO: 11.6 % — LOW (ref 13–44)
LYMPHOCYTES # BLD AUTO: 14.9 % — SIGNIFICANT CHANGE UP (ref 13–44)
MAGNESIUM SERPL-MCNC: 1.6 MG/DL — SIGNIFICANT CHANGE UP (ref 1.6–2.6)
MAGNESIUM SERPL-MCNC: 1.9 MG/DL — SIGNIFICANT CHANGE UP (ref 1.6–2.6)
MCHC RBC-ENTMCNC: 31.6 GM/DL — LOW (ref 32–36)
MCHC RBC-ENTMCNC: 32.3 GM/DL — SIGNIFICANT CHANGE UP (ref 32–36)
MCHC RBC-ENTMCNC: 32.6 GM/DL — SIGNIFICANT CHANGE UP (ref 32–36)
MCHC RBC-ENTMCNC: 33 PG — SIGNIFICANT CHANGE UP (ref 27–34)
MCHC RBC-ENTMCNC: 33.5 PG — SIGNIFICANT CHANGE UP (ref 27–34)
MCHC RBC-ENTMCNC: 33.9 PG — SIGNIFICANT CHANGE UP (ref 27–34)
MCV RBC AUTO: 102.6 FL — HIGH (ref 80–100)
MCV RBC AUTO: 104.4 FL — HIGH (ref 80–100)
MCV RBC AUTO: 104.9 FL — HIGH (ref 80–100)
METHADONE UR-MCNC: NEGATIVE — SIGNIFICANT CHANGE UP
MONOCYTES # BLD AUTO: 1.07 K/UL — HIGH (ref 0–0.9)
MONOCYTES # BLD AUTO: 1.22 K/UL — HIGH (ref 0–0.9)
MONOCYTES NFR BLD AUTO: 16.8 % — HIGH (ref 2–14)
MONOCYTES NFR BLD AUTO: 20.9 % — HIGH (ref 2–14)
NEUTROPHILS # BLD AUTO: 3.62 K/UL — SIGNIFICANT CHANGE UP (ref 1.8–7.4)
NEUTROPHILS # BLD AUTO: 4.38 K/UL — SIGNIFICANT CHANGE UP (ref 1.8–7.4)
NEUTROPHILS NFR BLD AUTO: 61.8 % — SIGNIFICANT CHANGE UP (ref 43–77)
NEUTROPHILS NFR BLD AUTO: 68.9 % — SIGNIFICANT CHANGE UP (ref 43–77)
NITRITE UR-MCNC: NEGATIVE — SIGNIFICANT CHANGE UP
NRBC # BLD: 0 /100 WBCS — SIGNIFICANT CHANGE UP
NRBC # FLD: 0 K/UL — SIGNIFICANT CHANGE UP
OPIATES UR-MCNC: NEGATIVE — SIGNIFICANT CHANGE UP
OXYCODONE UR-MCNC: NEGATIVE — SIGNIFICANT CHANGE UP
PCP SPEC-MCNC: SIGNIFICANT CHANGE UP
PCP UR-MCNC: NEGATIVE — SIGNIFICANT CHANGE UP
PH UR: 8 — SIGNIFICANT CHANGE UP (ref 5–8)
PHENOBARB SERPL-MCNC: 19.3 UG/ML — SIGNIFICANT CHANGE UP (ref 10–40)
PHOSPHATE SERPL-MCNC: 2.7 MG/DL — SIGNIFICANT CHANGE UP (ref 2.5–4.5)
PHOSPHATE SERPL-MCNC: 3.8 MG/DL — SIGNIFICANT CHANGE UP (ref 2.5–4.5)
PLATELET # BLD AUTO: 108 K/UL — LOW (ref 150–400)
PLATELET # BLD AUTO: 110 K/UL — LOW (ref 150–400)
PLATELET # BLD AUTO: 116 K/UL — LOW (ref 150–400)
POTASSIUM SERPL-MCNC: 3.1 MMOL/L — LOW (ref 3.5–5.3)
POTASSIUM SERPL-MCNC: 3.4 MMOL/L — LOW (ref 3.5–5.3)
POTASSIUM SERPL-MCNC: 3.9 MMOL/L — SIGNIFICANT CHANGE UP (ref 3.5–5.3)
POTASSIUM SERPL-SCNC: 3.1 MMOL/L — LOW (ref 3.5–5.3)
POTASSIUM SERPL-SCNC: 3.4 MMOL/L — LOW (ref 3.5–5.3)
POTASSIUM SERPL-SCNC: 3.9 MMOL/L — SIGNIFICANT CHANGE UP (ref 3.5–5.3)
PROT SERPL-MCNC: 5.8 G/DL — LOW (ref 6–8.3)
PROT SERPL-MCNC: 7 G/DL — SIGNIFICANT CHANGE UP (ref 6–8.3)
PROT UR-MCNC: NEGATIVE — SIGNIFICANT CHANGE UP
PROTHROM AB SERPL-ACNC: 12.2 SEC — SIGNIFICANT CHANGE UP (ref 10.5–13.4)
RBC # BLD: 2.27 M/UL — LOW (ref 4.2–5.8)
RBC # BLD: 2.45 M/UL — LOW (ref 4.2–5.8)
RBC # BLD: 2.66 M/UL — LOW (ref 4.2–5.8)
RBC # FLD: 13.6 % — SIGNIFICANT CHANGE UP (ref 10.3–14.5)
RBC # FLD: 13.7 % — SIGNIFICANT CHANGE UP (ref 10.3–14.5)
RBC # FLD: 13.7 % — SIGNIFICANT CHANGE UP (ref 10.3–14.5)
SODIUM SERPL-SCNC: 132 MMOL/L — LOW (ref 135–145)
SODIUM SERPL-SCNC: 136 MMOL/L — SIGNIFICANT CHANGE UP (ref 135–145)
SODIUM SERPL-SCNC: 137 MMOL/L — SIGNIFICANT CHANGE UP (ref 135–145)
SP GR SPEC: 1.01 — SIGNIFICANT CHANGE UP (ref 1–1.05)
T PALLIDUM AB TITR SER: NEGATIVE — SIGNIFICANT CHANGE UP
THC UR QL: NEGATIVE — SIGNIFICANT CHANGE UP
UROBILINOGEN FLD QL: SIGNIFICANT CHANGE UP
WBC # BLD: 5.85 K/UL — SIGNIFICANT CHANGE UP (ref 3.8–10.5)
WBC # BLD: 6.05 K/UL — SIGNIFICANT CHANGE UP (ref 3.8–10.5)
WBC # BLD: 6.36 K/UL — SIGNIFICANT CHANGE UP (ref 3.8–10.5)
WBC # FLD AUTO: 5.85 K/UL — SIGNIFICANT CHANGE UP (ref 3.8–10.5)
WBC # FLD AUTO: 6.05 K/UL — SIGNIFICANT CHANGE UP (ref 3.8–10.5)
WBC # FLD AUTO: 6.36 K/UL — SIGNIFICANT CHANGE UP (ref 3.8–10.5)

## 2022-07-07 PROCEDURE — 99232 SBSQ HOSP IP/OBS MODERATE 35: CPT | Mod: GC

## 2022-07-07 PROCEDURE — 99233 SBSQ HOSP IP/OBS HIGH 50: CPT | Mod: GC

## 2022-07-07 RX ORDER — MAGNESIUM SULFATE 500 MG/ML
2 VIAL (ML) INJECTION
Refills: 0 | Status: COMPLETED | OUTPATIENT
Start: 2022-07-07 | End: 2022-07-07

## 2022-07-07 RX ORDER — NICOTINE POLACRILEX 2 MG
1 GUM BUCCAL DAILY
Refills: 0 | Status: DISCONTINUED | OUTPATIENT
Start: 2022-07-07 | End: 2022-07-20

## 2022-07-07 RX ORDER — PIPERACILLIN AND TAZOBACTAM 4; .5 G/20ML; G/20ML
3.38 INJECTION, POWDER, LYOPHILIZED, FOR SOLUTION INTRAVENOUS EVERY 8 HOURS
Refills: 0 | Status: DISCONTINUED | OUTPATIENT
Start: 2022-07-07 | End: 2022-07-11

## 2022-07-07 RX ORDER — VANCOMYCIN HCL 1 G
750 VIAL (EA) INTRAVENOUS ONCE
Refills: 0 | Status: COMPLETED | OUTPATIENT
Start: 2022-07-07 | End: 2022-07-08

## 2022-07-07 RX ORDER — SODIUM CHLORIDE 9 MG/ML
1000 INJECTION, SOLUTION INTRAVENOUS ONCE
Refills: 0 | Status: COMPLETED | OUTPATIENT
Start: 2022-07-07 | End: 2022-07-07

## 2022-07-07 RX ORDER — PHENOBARBITAL 60 MG
65 TABLET ORAL ONCE
Refills: 0 | Status: DISCONTINUED | OUTPATIENT
Start: 2022-07-07 | End: 2022-07-07

## 2022-07-07 RX ORDER — POTASSIUM CHLORIDE 20 MEQ
40 PACKET (EA) ORAL ONCE
Refills: 0 | Status: COMPLETED | OUTPATIENT
Start: 2022-07-07 | End: 2022-07-07

## 2022-07-07 RX ORDER — SODIUM CHLORIDE 9 MG/ML
500 INJECTION, SOLUTION INTRAVENOUS ONCE
Refills: 0 | Status: COMPLETED | OUTPATIENT
Start: 2022-07-07 | End: 2022-07-07

## 2022-07-07 RX ORDER — THIAMINE MONONITRATE (VIT B1) 100 MG
500 TABLET ORAL EVERY 8 HOURS
Refills: 0 | Status: COMPLETED | OUTPATIENT
Start: 2022-07-07 | End: 2022-07-09

## 2022-07-07 RX ORDER — POTASSIUM PHOSPHATE, MONOBASIC POTASSIUM PHOSPHATE, DIBASIC 236; 224 MG/ML; MG/ML
15 INJECTION, SOLUTION INTRAVENOUS ONCE
Refills: 0 | Status: COMPLETED | OUTPATIENT
Start: 2022-07-07 | End: 2022-07-07

## 2022-07-07 RX ORDER — PIPERACILLIN AND TAZOBACTAM 4; .5 G/20ML; G/20ML
3.38 INJECTION, POWDER, LYOPHILIZED, FOR SOLUTION INTRAVENOUS ONCE
Refills: 0 | Status: COMPLETED | OUTPATIENT
Start: 2022-07-07 | End: 2022-07-07

## 2022-07-07 RX ORDER — POTASSIUM CHLORIDE 20 MEQ
10 PACKET (EA) ORAL
Refills: 0 | Status: COMPLETED | OUTPATIENT
Start: 2022-07-07 | End: 2022-07-07

## 2022-07-07 RX ORDER — MIDODRINE HYDROCHLORIDE 2.5 MG/1
5 TABLET ORAL ONCE
Refills: 0 | Status: COMPLETED | OUTPATIENT
Start: 2022-07-07 | End: 2022-07-07

## 2022-07-07 RX ORDER — ACETAMINOPHEN 500 MG
650 TABLET ORAL EVERY 6 HOURS
Refills: 0 | Status: DISCONTINUED | OUTPATIENT
Start: 2022-07-07 | End: 2022-07-20

## 2022-07-07 RX ADMIN — PANTOPRAZOLE SODIUM 40 MILLIGRAM(S): 20 TABLET, DELAYED RELEASE ORAL at 17:36

## 2022-07-07 RX ADMIN — SODIUM CHLORIDE 1000 MILLILITER(S): 9 INJECTION, SOLUTION INTRAVENOUS at 17:36

## 2022-07-07 RX ADMIN — Medication 1 PATCH: at 21:01

## 2022-07-07 RX ADMIN — CHLORHEXIDINE GLUCONATE 1 APPLICATION(S): 213 SOLUTION TOPICAL at 07:32

## 2022-07-07 RX ADMIN — PANTOPRAZOLE SODIUM 40 MILLIGRAM(S): 20 TABLET, DELAYED RELEASE ORAL at 05:24

## 2022-07-07 RX ADMIN — Medication 105 MILLIGRAM(S): at 08:29

## 2022-07-07 RX ADMIN — Medication 1 MILLIGRAM(S): at 11:04

## 2022-07-07 RX ADMIN — Medication 100 MILLIEQUIVALENT(S): at 07:31

## 2022-07-07 RX ADMIN — Medication 650 MILLIGRAM(S): at 12:44

## 2022-07-07 RX ADMIN — Medication 1 TABLET(S): at 11:05

## 2022-07-07 RX ADMIN — Medication 105 MILLIGRAM(S): at 13:03

## 2022-07-07 RX ADMIN — Medication 40 MILLIEQUIVALENT(S): at 10:33

## 2022-07-07 RX ADMIN — MIDODRINE HYDROCHLORIDE 5 MILLIGRAM(S): 2.5 TABLET ORAL at 20:54

## 2022-07-07 RX ADMIN — SODIUM CHLORIDE 500 MILLILITER(S): 9 INJECTION, SOLUTION INTRAVENOUS at 19:51

## 2022-07-07 RX ADMIN — Medication 100 MILLIEQUIVALENT(S): at 06:30

## 2022-07-07 RX ADMIN — Medication 65 MILLIGRAM(S): at 12:41

## 2022-07-07 RX ADMIN — Medication 650 MILLIGRAM(S): at 13:06

## 2022-07-07 RX ADMIN — Medication 105 MILLIGRAM(S): at 01:17

## 2022-07-07 RX ADMIN — Medication 25 GRAM(S): at 13:50

## 2022-07-07 RX ADMIN — PIPERACILLIN AND TAZOBACTAM 200 GRAM(S): 4; .5 INJECTION, POWDER, LYOPHILIZED, FOR SOLUTION INTRAVENOUS at 18:11

## 2022-07-07 RX ADMIN — Medication 25 GRAM(S): at 10:29

## 2022-07-07 RX ADMIN — Medication 105 MILLIGRAM(S): at 21:01

## 2022-07-07 RX ADMIN — Medication 100 MILLIEQUIVALENT(S): at 05:24

## 2022-07-07 RX ADMIN — POTASSIUM PHOSPHATE, MONOBASIC POTASSIUM PHOSPHATE, DIBASIC 62.5 MILLIMOLE(S): 236; 224 INJECTION, SOLUTION INTRAVENOUS at 10:34

## 2022-07-07 RX ADMIN — SODIUM CHLORIDE 500 MILLILITER(S): 9 INJECTION, SOLUTION INTRAVENOUS at 20:55

## 2022-07-07 NOTE — PROGRESS NOTE ADULT - ASSESSMENT
Mr. Syeda Johnson is a 50 yo M w/ PMH alcohol use, admitted from the ED 7/6 for AMS.    ===========NEURO============  #EtOH withdrawal  - UTox negative for alcohol, pt family reports last drink was 5 days ago, intoxication unlikely.  - Does report fall recently (~2 weeks ago), however CTH negative in ED on 7/6  - Restless, tremulous in the ED even after 2x 2mg ativan  - Loaded w/ 600 mg phenobarb IV, somnolent afterward  - continue IV phenobarb prn  - q1 vital checks  - thiamine/folate    ===========CV===========  HDS. No active issues    ===========PULM==============  No respiratory distress. Sating well RA. No active issues    ===========GI==============  Diet: NPO except meds    #Alcohol use disorder  - Unclear amount of drinking, however per report drinks beer & vodka daily.  - No known hx of liver disease  - AST 69, ALT 26. . TBili 1.2. INR 1.05. No evidence of acute alcohol hepatitis  - Mg 0.9 on arrival. Repleted 4 g    ===========/RENAL==============  #Hypokalemia  - K 3.2 on admission, 2.8 on arrival to ICU  - Repleted 60 mg total IV, will recheck and replete further as needed    ===========ID==============  Afebrile, WBC 4.98. No active issues  UA/UCx pending    ===========HEME==============  #Anemia  - Hb 7.5, repeat 7.8. Prev Hb 2 weeks ago was 12.3  - No known overt bleeding such as BRBPR, bloody vomitus. BARBARA negative    ===========ENDO==============  BG wnl, no hx DM. No active issues    ===========PPX==============  DVT ppx: Hold for now in setting of anemia  Decubiti: None  Code status: Full code     Mr. Syeda Johnson is a 50 yo M w/ PMH alcohol use, admitted from the ED 7/6 for AMS.    ===========NEURO============  #EtOH withdrawal  - UTox negative for alcohol, pt family reports last drink was 5 days ago, intoxication unlikely.  - Does report fall recently (~2 weeks ago), however CTH negative in ED on 7/6  - Restless, tremulous in the ED even after 2x 2mg ativan  - Loaded w/ 600 mg phenobarb IV & started on precedex on transfer to MICU, somnolent afterward.  - Precedex discontinued, no further agitation/restlessness and not requiring phenobarb. Awake, A&Ox3  - thiamine/folate    ===========CV===========  HDS. No active issues    ===========PULM==============  No respiratory distress. Sating well RA. No active issues    ===========GI==============  Diet: NPO except meds    #Alcohol use disorder  - Unclear amount of drinking, however per report drinks beer & vodka daily.  - No known hx of liver disease  - AST 69, ALT 26. . TBili 1.2. INR 1.05. No evidence of acute alcohol hepatitis  - Mg 0.9 on arrival. Repleted 4 g    ===========/RENAL==============  #Hypokalemia  - K 3.2 on admission, 2.8 on arrival to ICU  - Repleted 60 mg total IV, will recheck and replete further as needed    ===========ID==============  Afebrile, WBC 4.98. No active issues  UA/UCx pending    ===========HEME==============  #Anemia  - Hb 7.5, repeat 7.8. Prev Hb 2 weeks ago was 12.3  - No known overt bleeding such as BRBPR, bloody vomitus. BARBARA negative    ===========ENDO==============  BG wnl, no hx DM. No active issues    ===========PPX==============  DVT ppx: Hold for now in setting of anemia  Decubiti: None  Code status: Full code     Mr. Syeda Johnson is a 50 yo M w/ PMH alcohol use, admitted from the ED 7/6 for AMS.    ===========NEURO============  #EtOH withdrawal  - UTox negative for alcohol, pt family reports last drink was 5 days ago, intoxication unlikely.  - Does report fall recently (~2 weeks ago), however CTH negative in ED on 7/6  - Restless, tremulous in the ED even after 2x 2mg ativan  - Loaded w/ 600 mg phenobarb IV & started on precedex on transfer to MICU, somnolent afterward.  - Precedex discontinued, no further agitation/restlessness and not requiring phenobarb. Awake, A&Ox3  - thiamine/folate    ===========CV===========  HDS. No active issues    ===========PULM==============  No respiratory distress. Sating well RA. No active issues    ===========GI==============  Diet: NPO except meds    #Alcohol use disorder  - Unclear amount of drinking, however per report drinks beer & vodka daily.  - No known hx of liver disease  - AST 69, ALT 26. . TBili 1.2. INR 1.05. No evidence of acute alcohol hepatitis  - Mg 0.9 on arrival. Repleted 4 g    ===========/RENAL==============  #Hypokalemia  - K 3.2 on admission, 2.8 on arrival to ICU  - Repleted 60 mg total IV  - Replete as needed    ===========ID==============  Afebrile, WBC 4.98. No active issues  UA/UCx pending    ===========HEME==============  #Anemia  - Hb 7.5, repeat 7.8. Prev Hb 2 weeks ago was 12.3  - No known overt bleeding such as BRBPR, bloody vomitus. BARBARA negative    ===========ENDO==============  BG wnl, no hx DM. No active issues    ===========PPX==============  DVT ppx: Hold for now in setting of anemia  Decubiti: None  Code status: Full code     Mr. Syeda Johnson is a 50 yo M w/ PMH alcohol use, admitted from the ED 7/6 for AMS.    ===========NEURO============  #EtOH withdrawal  - UTox negative for alcohol, pt family reports last drink was 5 days ago, intoxication unlikely.  - Does report fall recently (~2 weeks ago), however CTH negative in ED on 7/6  - Restless, tremulous in the ED even after 2x 2mg ativan  - Loaded w/ 600 mg phenobarb IV & started on precedex on transfer to MICU, somnolent afterward.  - Precedex discontinued, no further agitation/restlessness and not requiring phenobarb. Awake, A&Ox3  - thiamine/folate    ===========CV===========  #Tachycardia  - Mild tachycardia to 100s. Became hypotensive to 79/62 overnight, responded to 1 L NS    ===========PULM==============  No respiratory distress. Sating well RA. No active issues    ===========GI==============  Diet: NPO except meds    #Alcohol use disorder  - Unclear amount of drinking, however per report drinks beer & vodka daily.  - No known hx of liver disease  - AST 69, ALT 26. . TBili 1.2. INR 1.05. No evidence of acute alcohol hepatitis  - Mg 0.9 on arrival. Repleted 4 g    ===========/RENAL==============  #Hypokalemia  - K 3.2 on admission, 2.8 on arrival to ICU  - Repleted 60 mg total IV  - Replete as needed    ===========ID==============  WBC 4.98. Low fever to 100.2, BCx pending  UA negative    ===========HEME==============  #Anemia  - Hb 7.5, repeat 7.8. Prev Hb 2 weeks ago was 12.3  - No known overt bleeding such as BRBPR, bloody vomitus. BARBARA negative  - ctm    ===========ENDO==============  BG wnl, no hx DM. No active issues    ===========PPX==============  DVT ppx: Hold for now in setting of anemia  Decubiti: None  Code status: Full code     Mr. Syeda Johnson is a 50 yo M w/ PMH alcohol use, admitted from the ED 7/6 for AMS.    ===========NEURO============  #EtOH withdrawal  - UTox negative for alcohol, pt family reports last drink was 5 days ago, intoxication unlikely.  - Does report fall recently (~2 weeks ago), however CTH negative in ED on 7/6  - Restless, tremulous in the ED even after 2x 2mg ativan  - Loaded w/ 600 mg phenobarb IV & started on precedex on transfer to MICU, somnolent afterward.  - Precedex discontinued, no further agitation/restlessness and not requiring phenobarb. Awake, A&Ox3. Constant obs discontinued  - thiamine/folate    ===========CV===========  #Tachycardia  - Persistent mild tachycardia to 100s. Became hypotensive to 79/62 overnight, recheck 83/64, no fluids given. Pt asymptomatic  - Pressures soft throughout admission. Received 1 L LR, 1 L NS in the ED on arrival  - continue cbc q12, monitor for bleeding    ===========PULM==============  No respiratory distress. Sating well RA. No active issues    ===========GI==============  Diet: regular    #Alcohol use disorder  - Unclear amount of drinking, however per report drinks beer & vodka daily.  - No known hx of liver disease  - AST 69, ALT 26. . TBili 1.2. INR 1.05. No evidence of acute alcohol hepatitis  - Mg 0.9 on arrival. Repleted 4 g    #?GI bleed  - Hb 7.5, repeat 7.8. Prev Hb 2 weeks ago was 12.3  - initially no reports of bleeding, pt denies any bloody vomiting or stools. However pt family member reported seeing blood in toilet & sink 2 days prior to admission  - started on IV PPI BID 7/6  - cbc q12, transfuse Hb <7  - GI consulted, recs appreciated    ===========/RENAL==============  #Hypokalemia  - K 3.2 on admission, 2.8 on arrival to ICU  - Repleted 60 mg total IV  - Replete as needed    ===========ID==============  #fever  - Low fever to 100.2  - WBC 4.98  - UA negative  - 7/6 BCx pending    ===========HEME==============  #Anemia  - Hb 7.5, repeat 7.8. Prev Hb 2 weeks ago was 12.3  - Pt denies BRBPR, black stools, bloody vomiting. However family member reported seeing blood in toilet & sink 2 days prior to admission  - 7/6 BARBARA negative  - Hb 8.9 on 7/7 am    ===========ENDO==============  BG wnl, no hx DM. No active issues    ===========PPX==============  DVT ppx: Hold for now in setting of anemia  Decubiti: None  Code status: Full code

## 2022-07-07 NOTE — PROGRESS NOTE ADULT - PROBLEM SELECTOR PLAN 5
DVT ppx: Hold for now in setting of anemia  Decubiti: None  Code status: Full code - Persistent mild tachycardia to 100s. Became hypotensive to 79/62 overnight, recheck 83/64, no fluids given. Pt asymptomatic  - Pressures soft throughout admission. Received 1 L LR, 1 L NS in the ED on arrival, got another litter in the MICU ON 07/07  - continue cbc q12, monitor for bleeding

## 2022-07-07 NOTE — PROGRESS NOTE ADULT - ASSESSMENT
Pt is a 50 yo M w/ PMHx alcohol use disorder, admitted to MICU for AMS in setting of recent alcohol cessation. GI consulted for drop in Hgb from patient's baseline near 12 to 7.5.    Impression  #Acute encephalopathy: on phenobarb and precedex prn for possible alcohol withdrawal, management per MICU team  #Anemia: drop in Hgb from 12 in June to 7.5. Per sister, bright red blood seen in patient's toilet bowl at home    Recommendations  #Anemia, macrocytic. Hgb stable. Patient denies hematochezia, melena. Given concomitant thrombocytopenia, likely in setting of chronic alcohol use  - trend CBC, transfuse for Hgb<7   - send iron studies  - no plan for GI intervention at this time     GI will sign-off at this time. Please reconsult if needed.     All recommendations are tentative until note is attested by attending.     Kaelyn Adkins, PGY5  Gastroenterology/Hepatology Fellow  Available on [employer] Teams  [phone] ([employer] Short Range Pager)  [phone] (Long Range Pager)    After 5pm, please contact the on-call GI fellow. [phone]

## 2022-07-07 NOTE — PROGRESS NOTE ADULT - PROBLEM SELECTOR PLAN 3
#Hypokalemia  - K 3.2 on admission, 2.8 on arrival to ICU  - Repleted 60 mg total IV  - Replete as needed  - Mg 0.9 on arrival. Repleted 4 g - Hb 7.5, repeat 8.3. Prev Hb 2 weeks ago was 12.3  - initially no reports of bleeding, pt denies any bloody vomiting or stools. However pt family member reported seeing blood in toilet & sink 2 days prior to admission  - started on IV PPI BID 7/6  - cbc q12, transfuse Hb <7  - GI consulted, Anemia, macrocytic. Hgb stable. Patient denies hematochezia, melena. Given concomitant thrombocytopenia, likely in setting of chronic alcohol use  - no plan for GI intervention at this time

## 2022-07-07 NOTE — CHART NOTE - NSCHARTNOTEFT_GEN_A_CORE
MICU Transfer Note  ---------------------------    Transfer from: MICU  Transfer to:  (  ) Medicine    (  ) Telemetry    (  ) RCU    (  ) Palliative    (  ) Stroke Unit    (  ) _______________  [facility]        OBJECTIVE --  Vital Signs Last 24 Hrs  T(C): 37.9 (07 Jul 2022 08:00), Max: 38.5 (06 Jul 2022 20:00)  T(F): 100.2 (07 Jul 2022 08:00), Max: 101.3 (06 Jul 2022 20:00)  HR: 102 (07 Jul 2022 10:00) (87 - 123)  BP: 96/73 (07 Jul 2022 10:00) (70/42 - 136/96)  BP(mean): 81 (07 Jul 2022 10:00) (52 - 109)  RR: 20 (07 Jul 2022 10:00) (15 - 25)  SpO2: 100% (07 Jul 2022 10:00) (95% - 100%)    I&O's Summary    06 Jul 2022 07:01  -  07 Jul 2022 07:00  --------------------------------------------------------  IN: 2250 mL / OUT: 1500 mL / NET: 750 mL        MEDICATIONS  (STANDING):  chlorhexidine 4% Liquid 1 Application(s) Topical <User Schedule>  folic acid 1 milliGRAM(s) Oral daily  magnesium sulfate  IVPB 2 Gram(s) IV Intermittent every 2 hours  multivitamin 1 Tablet(s) Oral daily  pantoprazole  Injectable 40 milliGRAM(s) IV Push every 12 hours  thiamine IVPB 500 milliGRAM(s) IV Intermittent every 8 hours    MEDICATIONS  (PRN):  PHENobarbital Injectable 130 milliGRAM(s) IV Push every 15 minutes PRN Etoh Withdrawal        LABS                                            8.9                   Neurophils% (auto):   68.9   (07-07 @ 04:25):    6.36 )-----------(116          Lymphocytes% (auto):  11.6                                          27.3                   Eosinphils% (auto):   1.6      Manual%: Neutrophils x    ; Lymphocytes x    ; Eosinophils x    ; Bands%: x    ; Blasts x                                    137    |  94     |  5                   Calcium: 9.0   / iCa: x      (07-07 @ 04:47)    ----------------------------<  106       Magnesium: 1.60                             3.1     |  31     |  0.66             Phosphorous: 2.7      TPro  7.0    /  Alb  3.8    /  TBili  1.3    /  DBili  x      /  AST  53     /  ALT  20     /  AlkPhos  124    07 Jul 2022 04:47    ( 07-07 @ 04:25 )   PT: 12.2 sec;   INR: 1.05 ratio  aPTT: 26.8 sec          ASSESSMENT & PLAN:         For Follow-Up:  [ ]   [ ] MICU Transfer Note  ---------------------------    Transfer from: MICU  Transfer to:  ( x ) Medicine    (  ) Telemetry    (  ) RCU    (  ) Palliative    (  ) Stroke Unit    (  ) _______________  Accepting Physician: Dr. Alfredo avalos      52 yo M w/ PMH alcohol use, admitted from the ED 7/6 for AMS. Collateral info from chart and pt's sister Elzbieta ([phone]).  Pt had 1 day of AMS. Confusion, visual hallucinations, anxiety. Pt called his sister claiming there was a person in his home, but no one was there. Pt also reported seeing mice which were not there. No reports of increased diaphoresis, fever, palpitations, chest pain, SOB. Pt called 911 due to perceiving a person in his home, and was brought to the ED. Observed to be disoriented, restless, tremulous. There he was normotensive & afebrile w/ mild tachycardia to 101. UTox negative for alcohol. CT head normal. Received 2 mg ativan x2 in the ED, continued to be restless & confused. Found to have Hb 7.5 (repeat 7.8), no reported rectal bleeding or bloody vomiting. Last Hb 6/23 was 12.3. Of note, pt was previously a  working night shifts. On 6/23, while at work, he suffered a fall and went to the ED, discharged home. After being discharged home, he received notice that he was being fired for being intoxicated at work. Per sister, he was motivated to stop drinking after losing his job and had his last drink 5 days ago.     Per sister, pt has a "drinking problem" and drinks beer & vodka (unknown # of drinks) to help him fall asleep during the day. She states that he has drank like this for ~1 year, prior to 1 year go he had lower alcohol intake.    No other known medical hx. He does not take any medications nor any substances other than alcohol & tobacco (smokes cigarettes, cigars). His sister states that he was hospitalized at [facility] ~2 years ago for "tuberculosis" but does not recall any details.      MICU Course    Patient was loaded with phenobarbital and started on precedex gtt. Hgb 7.5 in ED, family member had noticed BRB in sink and toilet 2 days prior to arrival in hospital. Required one extra phenobarbital PRN here in the MICU. Patient had negative BARBARA in the MICU               OBJECTIVE --  Vital Signs Last 24 Hrs  T(C): 37.9 (07 Jul 2022 08:00), Max: 38.5 (06 Jul 2022 20:00)  T(F): 100.2 (07 Jul 2022 08:00), Max: 101.3 (06 Jul 2022 20:00)  HR: 102 (07 Jul 2022 10:00) (87 - 123)  BP: 96/73 (07 Jul 2022 10:00) (70/42 - 136/96)  BP(mean): 81 (07 Jul 2022 10:00) (52 - 109)  RR: 20 (07 Jul 2022 10:00) (15 - 25)  SpO2: 100% (07 Jul 2022 10:00) (95% - 100%)    I&O's Summary    06 Jul 2022 07:01  -  07 Jul 2022 07:00  --------------------------------------------------------  IN: 2250 mL / OUT: 1500 mL / NET: 750 mL        MEDICATIONS  (STANDING):  chlorhexidine 4% Liquid 1 Application(s) Topical <User Schedule>  folic acid 1 milliGRAM(s) Oral daily  magnesium sulfate  IVPB 2 Gram(s) IV Intermittent every 2 hours  multivitamin 1 Tablet(s) Oral daily  pantoprazole  Injectable 40 milliGRAM(s) IV Push every 12 hours  thiamine IVPB 500 milliGRAM(s) IV Intermittent every 8 hours    MEDICATIONS  (PRN):  PHENobarbital Injectable 130 milliGRAM(s) IV Push every 15 minutes PRN Etoh Withdrawal        LABS                                            8.9                   Neurophils% (auto):   68.9   (07-07 @ 04:25):    6.36 )-----------(116          Lymphocytes% (auto):  11.6                                          27.3                   Eosinphils% (auto):   1.6      Manual%: Neutrophils x    ; Lymphocytes x    ; Eosinophils x    ; Bands%: x    ; Blasts x                                    137    |  94     |  5                   Calcium: 9.0   / iCa: x      (07-07 @ 04:47)    ----------------------------<  106       Magnesium: 1.60                             3.1     |  31     |  0.66             Phosphorous: 2.7      TPro  7.0    /  Alb  3.8    /  TBili  1.3    /  DBili  x      /  AST  53     /  ALT  20     /  AlkPhos  124    07 Jul 2022 04:47    ( 07-07 @ 04:25 )   PT: 12.2 sec;   INR: 1.05 ratio  aPTT: 26.8 sec          ASSESSMENT & PLAN:         For Follow-Up:  [ ]   [ ] MICU Transfer Note  ---------------------------    Transfer from: MICU  Transfer to:  ( x ) Medicine    (  ) Telemetry    (  ) RCU    (  ) Palliative    (  ) Stroke Unit    (  ) _______________  Accepting Physician: Dr. Alfredo avalos      50 yo M w/ PMH alcohol use, admitted from the ED 7/6 for AMS. Collateral info from chart and pt's sister Elzbieta ([phone]).  Pt had 1 day of AMS. Confusion, visual hallucinations, anxiety. Pt called his sister claiming there was a person in his home, but no one was there. Pt also reported seeing mice which were not there. No reports of increased diaphoresis, fever, palpitations, chest pain, SOB. Pt called 911 due to perceiving a person in his home, and was brought to the ED. Observed to be disoriented, restless, tremulous. There he was normotensive & afebrile w/ mild tachycardia to 101. UTox negative for alcohol. CT head normal. Received 2 mg ativan x2 in the ED, continued to be restless & confused. Found to have Hb 7.5 (repeat 7.8), no reported rectal bleeding or bloody vomiting. Last Hb 6/23 was 12.3. Of note, pt was previously a  working night shifts. On 6/23, while at work, he suffered a fall and went to the ED, discharged home. After being discharged home, he received notice that he was being fired for being intoxicated at work. Per sister, he was motivated to stop drinking after losing his job and had his last drink 5 days ago. Per sister, pt has a "drinking problem" and drinks beer & vodka (unknown # of drinks) to help him fall asleep during the day. She states that he has drank like this for ~1 year, prior to 1 year go he had lower alcohol intake.    No other known medical hx. He does not take any medications nor any substances other than alcohol & tobacco (smokes cigarettes, cigars). His sister states that he was hospitalized at [facility] ~2 years ago for "tuberculosis" but does not recall any details.      MICU Course    Patient was loaded with phenobarbital and started on precedex gtt (for a few hours). Hgb 7.5 in ED, family member had noticed BRB in sink and toilet 2 days prior to arrival in hospital. Required one extra phenobarbital PRN here in the MICU. Patient had negative BARBARA in the MICU. After 24 hours became less agitated and confused. He is not requiring CO, and is eating a diet.  Did have a low grade fevers - for which bcx were sent. Blood consent in chart.               OBJECTIVE --  Vital Signs Last 24 Hrs  T(C): 37.9 (07 Jul 2022 08:00), Max: 38.5 (06 Jul 2022 20:00)  T(F): 100.2 (07 Jul 2022 08:00), Max: 101.3 (06 Jul 2022 20:00)  HR: 102 (07 Jul 2022 10:00) (87 - 123)  BP: 96/73 (07 Jul 2022 10:00) (70/42 - 136/96)  BP(mean): 81 (07 Jul 2022 10:00) (52 - 109)  RR: 20 (07 Jul 2022 10:00) (15 - 25)  SpO2: 100% (07 Jul 2022 10:00) (95% - 100%)    I&O's Summary    06 Jul 2022 07:01  -  07 Jul 2022 07:00  --------------------------------------------------------  IN: 2250 mL / OUT: 1500 mL / NET: 750 mL        MEDICATIONS  (STANDING):  chlorhexidine 4% Liquid 1 Application(s) Topical <User Schedule>  folic acid 1 milliGRAM(s) Oral daily  magnesium sulfate  IVPB 2 Gram(s) IV Intermittent every 2 hours  multivitamin 1 Tablet(s) Oral daily  pantoprazole  Injectable 40 milliGRAM(s) IV Push every 12 hours  thiamine IVPB 500 milliGRAM(s) IV Intermittent every 8 hours    MEDICATIONS  (PRN):  PHENobarbital Injectable 130 milliGRAM(s) IV Push every 15 minutes PRN Etoh Withdrawal        LABS                                            8.9                   Neurophils% (auto):   68.9   (07-07 @ 04:25):    6.36 )-----------(116          Lymphocytes% (auto):  11.6                                          27.3                   Eosinphils% (auto):   1.6      Manual%: Neutrophils x    ; Lymphocytes x    ; Eosinophils x    ; Bands%: x    ; Blasts x                                    137    |  94     |  5                   Calcium: 9.0   / iCa: x      (07-07 @ 04:47)    ----------------------------<  106       Magnesium: 1.60                             3.1     |  31     |  0.66             Phosphorous: 2.7      TPro  7.0    /  Alb  3.8    /  TBili  1.3    /  DBili  x      /  AST  53     /  ALT  20     /  AlkPhos  124    07 Jul 2022 04:47    ( 07-07 @ 04:25 )   PT: 12.2 sec;   INR: 1.05 ratio  aPTT: 26.8 sec          ASSESSMENT & PLAN:         For Follow-Up:  [ ] F/U Bcx   [ ] Trend Hgb   [ ] F/U GI reccs MICU Transfer Note  ---------------------------    Transfer from: MICU  Transfer to:  ( x ) Medicine    (  ) Telemetry    (  ) RCU    (  ) Palliative    (  ) Stroke Unit    (  ) _______________  Accepting Physician: Dr. Alfredo Scales      50 yo M w/ PMH alcohol use, admitted from the ED 7/6 for AMS. Collateral info from chart and pt's sister Elzbieta ([phone]).  Pt had 1 day of AMS. Confusion, visual hallucinations, anxiety. Pt called his sister claiming there was a person in his home, but no one was there. Pt also reported seeing mice which were not there. No reports of increased diaphoresis, fever, palpitations, chest pain, SOB. Pt called 911 due to perceiving a person in his home, and was brought to the ED. Observed to be disoriented, restless, tremulous. There he was normotensive & afebrile w/ mild tachycardia to 101. UTox negative for alcohol. CT head normal. Received 2 mg ativan x2 in the ED, continued to be restless & confused. Found to have Hb 7.5 (repeat 7.8), no reported rectal bleeding or bloody vomiting. Last Hb 6/23 was 12.3. Of note, pt was previously a  working night shifts. On 6/23, while at work, he suffered a fall and went to the ED, discharged home. After being discharged home, he received notice that he was being fired for being intoxicated at work. Per sister, he was motivated to stop drinking after losing his job and had his last drink 5 days ago. Per sister, pt has a "drinking problem" and drinks beer & vodka (unknown # of drinks) to help him fall asleep during the day. She states that he has drank like this for ~1 year, prior to 1 year go he had lower alcohol intake.    No other known medical hx. He does not take any medications nor any substances other than alcohol & tobacco (smokes cigarettes, cigars). His sister states that he was hospitalized at [facility] ~2 years ago for "tuberculosis" but does not recall any details.      MICU Course    Patient was loaded with phenobarbital and started on precedex gtt (for a few hours). Hgb 7.5 in ED, family member had noticed BRB in sink and toilet 2 days prior to arrival in hospital. Required one extra phenobarbital PRN here in the MICU. Patient had negative BARBARA in the MICU. After 24 hours became less agitated and confused. He is not requiring CO, and is eating a diet.  Did have a low grade fevers - for which bcx were sent. Blood consent in chart.               OBJECTIVE --  Vital Signs Last 24 Hrs  T(C): 37.9 (07 Jul 2022 08:00), Max: 38.5 (06 Jul 2022 20:00)  T(F): 100.2 (07 Jul 2022 08:00), Max: 101.3 (06 Jul 2022 20:00)  HR: 102 (07 Jul 2022 10:00) (87 - 123)  BP: 96/73 (07 Jul 2022 10:00) (70/42 - 136/96)  BP(mean): 81 (07 Jul 2022 10:00) (52 - 109)  RR: 20 (07 Jul 2022 10:00) (15 - 25)  SpO2: 100% (07 Jul 2022 10:00) (95% - 100%)    I&O's Summary    06 Jul 2022 07:01  -  07 Jul 2022 07:00  --------------------------------------------------------  IN: 2250 mL / OUT: 1500 mL / NET: 750 mL        MEDICATIONS  (STANDING):  chlorhexidine 4% Liquid 1 Application(s) Topical <User Schedule>  folic acid 1 milliGRAM(s) Oral daily  magnesium sulfate  IVPB 2 Gram(s) IV Intermittent every 2 hours  multivitamin 1 Tablet(s) Oral daily  pantoprazole  Injectable 40 milliGRAM(s) IV Push every 12 hours  thiamine IVPB 500 milliGRAM(s) IV Intermittent every 8 hours    MEDICATIONS  (PRN):  PHENobarbital Injectable 130 milliGRAM(s) IV Push every 15 minutes PRN Etoh Withdrawal        LABS                                            8.9                   Neurophils% (auto):   68.9   (07-07 @ 04:25):    6.36 )-----------(116          Lymphocytes% (auto):  11.6                                          27.3                   Eosinphils% (auto):   1.6      Manual%: Neutrophils x    ; Lymphocytes x    ; Eosinophils x    ; Bands%: x    ; Blasts x                                    137    |  94     |  5                   Calcium: 9.0   / iCa: x      (07-07 @ 04:47)    ----------------------------<  106       Magnesium: 1.60                             3.1     |  31     |  0.66             Phosphorous: 2.7      TPro  7.0    /  Alb  3.8    /  TBili  1.3    /  DBili  x      /  AST  53     /  ALT  20     /  AlkPhos  124    07 Jul 2022 04:47    ( 07-07 @ 04:25 )   PT: 12.2 sec;   INR: 1.05 ratio  aPTT: 26.8 sec          ASSESSMENT & PLAN:   Mr. Syeda Johnson is a 50 yo M w/ PMH alcohol use, admitted from the ED 7/6 for AMS, suspected 2/2 etoh withdrawal. Also found to have acute anemia & possible recent GI bleed.    ===========NEURO============  #EtOH withdrawal  - UTox negative for alcohol, pt family reports last drink was 5 days ago, intoxication unlikely.  - Does report fall recently (~2 weeks ago), however CTH negative in ED on 7/6  - Restless, tremulous in the ED even after 2x 2mg ativan  - Loaded w/ 600 mg phenobarb IV & started on precedex on transfer to MICU, somnolent afterward.  - Precedex discontinued, no further agitation/restlessness and not requiring phenobarb. Awake, A&Ox3. Constant obs discontinued  - thiamine/folate    ===========CV===========  #Tachycardia  - Persistent mild tachycardia to 100s. Became hypotensive to 79/62 overnight, recheck 83/64, no fluids given. Pt asymptomatic  - Pressures soft throughout admission. Received 1 L LR, 1 L NS in the ED on arrival  - continue cbc q12, monitor for bleeding    ===========PULM==============  No respiratory distress. Sating well RA. No active issues    ===========GI==============  Diet: regular    #Alcohol use disorder  - Unclear amount of drinking, however per report drinks beer & vodka daily.  - No known hx of liver disease  - AST 69, ALT 26. . TBili 1.2. INR 1.05. No evidence of acute alcohol hepatitis  - Mg 0.9 on arrival. Repleted 4 g    #?GI bleed  - Hb 7.5, repeat 7.8. Prev Hb 2 weeks ago was 12.3  - initially no reports of bleeding, pt denies any bloody vomiting or stools. However pt family member reported seeing blood in toilet & sink 2 days prior to admission  - started on IV PPI BID 7/6  - cbc q12, transfuse Hb <7  - GI consulted, recs appreciated    ===========/RENAL==============  #Hypokalemia  - K 3.2 on admission, 2.8 on arrival to ICU  - Repleted 60 mg total IV  - Replete as needed    ===========ID==============  #fever  - Low fever to 100.2  - WBC 4.98  - UA negative  - 7/6 BCx pending    ===========HEME==============  #Anemia  - Hb 7.5, repeat 7.8. Prev Hb 2 weeks ago was 12.3  - Pt denies BRBPR, black stools, bloody vomiting. However family member reported seeing blood in toilet & sink 2 days prior to admission  - 7/6 BARBARA negative  - Hb 8.9 on 7/7 am    ===========ENDO==============  BG wnl, no hx DM. No active issues    ===========PPX==============  DVT ppx: Hold for now in setting of anemia  Decubiti: None  Code status: Full code        For Follow-Up:  [ ] F/U Bcx   [ ] Trend Hgb   [ ] F/U GI reccs MICU Transfer Note  ---------------------------    Transfer from: MICU  Transfer to:  ( x ) Medicine    (  ) Telemetry    (  ) RCU    (  ) Palliative    (  ) Stroke Unit    (  ) _______________  Accepting Physician: Dr. Alfredo Scales      52 yo M w/ PMH alcohol use, admitted from the ED 7/6 for AMS. Collateral info from chart and pt's sister Elzibeta ([phone]).  Pt had 1 day of AMS. Confusion, visual hallucinations, anxiety. Pt called his sister claiming there was a person in his home, but no one was there. Pt also reported seeing mice which were not there. No reports of increased diaphoresis, fever, palpitations, chest pain, SOB. Pt called 911 due to perceiving a person in his home, and was brought to the ED. Observed to be disoriented, restless, tremulous. There he was normotensive & afebrile w/ mild tachycardia to 101. UTox negative for alcohol. CT head normal. Received 2 mg ativan x2 in the ED, continued to be restless & confused. Found to have Hb 7.5 (repeat 7.8), no reported rectal bleeding or bloody vomiting. Last Hb 6/23 was 12.3. Of note, pt was previously a  working night shifts. On 6/23, while at work, he suffered a fall and went to the ED, discharged home. After being discharged home, he received notice that he was being fired for being intoxicated at work. Per sister, he was motivated to stop drinking after losing his job and had his last drink 5 days ago. Per sister, pt has a "drinking problem" and drinks beer & vodka (unknown # of drinks) to help him fall asleep during the day. She states that he has drank like this for ~1 year, prior to 1 year go he had lower alcohol intake.    No other known medical hx. He does not take any medications nor any substances other than alcohol & tobacco (smokes cigarettes, cigars). His sister states that he was hospitalized at [facility] ~2 years ago for "tuberculosis" but does not recall any details.      MICU Course    Patient was loaded with phenobarbital and started on precedex gtt (for a few hours). Hgb 7.5 in ED, family member had noticed BRB in sink and toilet 2 days prior to arrival in hospital. Required phenobarbital PRN here in the MICU. Patient had negative BARBARA in the MICU. After 24 hours became less agitated and confused. He is not requiring CO, and is eating a diet.  Did have a low grade fevers - for which bcx were sent. Blood consent in chart.               OBJECTIVE --  Vital Signs Last 24 Hrs  T(C): 37.9 (07 Jul 2022 08:00), Max: 38.5 (06 Jul 2022 20:00)  T(F): 100.2 (07 Jul 2022 08:00), Max: 101.3 (06 Jul 2022 20:00)  HR: 102 (07 Jul 2022 10:00) (87 - 123)  BP: 96/73 (07 Jul 2022 10:00) (70/42 - 136/96)  BP(mean): 81 (07 Jul 2022 10:00) (52 - 109)  RR: 20 (07 Jul 2022 10:00) (15 - 25)  SpO2: 100% (07 Jul 2022 10:00) (95% - 100%)    I&O's Summary    06 Jul 2022 07:01  -  07 Jul 2022 07:00  --------------------------------------------------------  IN: 2250 mL / OUT: 1500 mL / NET: 750 mL        MEDICATIONS  (STANDING):  chlorhexidine 4% Liquid 1 Application(s) Topical <User Schedule>  folic acid 1 milliGRAM(s) Oral daily  magnesium sulfate  IVPB 2 Gram(s) IV Intermittent every 2 hours  multivitamin 1 Tablet(s) Oral daily  pantoprazole  Injectable 40 milliGRAM(s) IV Push every 12 hours  thiamine IVPB 500 milliGRAM(s) IV Intermittent every 8 hours    MEDICATIONS  (PRN):  PHENobarbital Injectable 130 milliGRAM(s) IV Push every 15 minutes PRN Etoh Withdrawal        LABS                                            8.9                   Neurophils% (auto):   68.9   (07-07 @ 04:25):    6.36 )-----------(116          Lymphocytes% (auto):  11.6                                          27.3                   Eosinphils% (auto):   1.6      Manual%: Neutrophils x    ; Lymphocytes x    ; Eosinophils x    ; Bands%: x    ; Blasts x                                    137    |  94     |  5                   Calcium: 9.0   / iCa: x      (07-07 @ 04:47)    ----------------------------<  106       Magnesium: 1.60                             3.1     |  31     |  0.66             Phosphorous: 2.7      TPro  7.0    /  Alb  3.8    /  TBili  1.3    /  DBili  x      /  AST  53     /  ALT  20     /  AlkPhos  124    07 Jul 2022 04:47    ( 07-07 @ 04:25 )   PT: 12.2 sec;   INR: 1.05 ratio  aPTT: 26.8 sec          ASSESSMENT & PLAN:   Mr. Syeda Johnson is a 52 yo M w/ PMH alcohol use, admitted from the ED 7/6 for AMS, suspected 2/2 etoh withdrawal. Also found to have acute anemia & possible recent GI bleed.    ===========NEURO============  #EtOH withdrawal  - UTox negative for alcohol, pt family reports last drink was 5 days ago, intoxication unlikely.  - Does report fall recently (~2 weeks ago), however CTH negative in ED on 7/6  - Restless, tremulous in the ED even after 2x 2mg ativan  - Loaded w/ 600 mg phenobarb IV & started on precedex on transfer to MICU, somnolent afterward.  - Precedex discontinued, no further agitation/restlessness and not requiring phenobarb. Awake, A&Ox3. Constant obs discontinued  - thiamine/folate    ===========CV===========  #Tachycardia  - Persistent mild tachycardia to 100s. Became hypotensive to 79/62 overnight, recheck 83/64, no fluids given. Pt asymptomatic  - Pressures soft throughout admission. Received 1 L LR, 1 L NS in the ED on arrival  - continue cbc q12, monitor for bleeding    ===========PULM==============  No respiratory distress. Sating well RA. No active issues    ===========GI==============  Diet: regular    #Alcohol use disorder  - Unclear amount of drinking, however per report drinks beer & vodka daily.  - No known hx of liver disease  - AST 69, ALT 26. . TBili 1.2. INR 1.05. No evidence of acute alcohol hepatitis  - Mg 0.9 on arrival. Repleted 4 g    #?GI bleed  - Hb 7.5, repeat 7.8. Prev Hb 2 weeks ago was 12.3  - initially no reports of bleeding, pt denies any bloody vomiting or stools. However pt family member reported seeing blood in toilet & sink 2 days prior to admission  - started on IV PPI BID 7/6  - cbc q12, transfuse Hb <7  - GI consulted, recs appreciated    ===========/RENAL==============  #Hypokalemia  - K 3.2 on admission, 2.8 on arrival to ICU  - Repleted 60 mg total IV  - Replete as needed    ===========ID==============  #fever  - Low fever to 100.2  - WBC 4.98  - UA negative  - 7/6 BCx pending    ===========HEME==============  #Anemia  - Hb 7.5, repeat 7.8. Prev Hb 2 weeks ago was 12.3  - Pt denies BRBPR, black stools, bloody vomiting. However family member reported seeing blood in toilet & sink 2 days prior to admission  - 7/6 BARBARA negative  - Hb 8.9 on 7/7 am    ===========ENDO==============  BG wnl, no hx DM. No active issues    ===========PPX==============  DVT ppx: Hold for now in setting of anemia  Decubiti: None  Code status: Full code        For Follow-Up:  [ ] F/U Bcx   [ ] Trend Hgb   [ ] F/U GI reccs

## 2022-07-07 NOTE — PROGRESS NOTE ADULT - SUBJECTIVE AND OBJECTIVE BOX
Gastroenterology/Hepatology Progress Note    Interval Events: No events overnight. Patient denies any abdominal pain, melena, hematochezia, nausea, vomiting. No BMs since admission. Patient awake, AAOx3 today. States he had no melena or hematochezia prior to coming to the hospital.    Allergies:  No Known Allergies      Hospital Medications:  acetaminophen     Tablet .. 650 milliGRAM(s) Oral every 6 hours PRN  chlorhexidine 4% Liquid 1 Application(s) Topical <User Schedule>  folic acid 1 milliGRAM(s) Oral daily  magnesium sulfate  IVPB 2 Gram(s) IV Intermittent every 2 hours  multivitamin 1 Tablet(s) Oral daily  pantoprazole  Injectable 40 milliGRAM(s) IV Push every 12 hours  PHENobarbital Injectable 130 milliGRAM(s) IV Push every 15 minutes PRN  thiamine IVPB 500 milliGRAM(s) IV Intermittent every 8 hours      ROS: 14 point ROS negative unless otherwise state in subjective    PHYSICAL EXAM:   Vital Signs:  Vital Signs Last 24 Hrs  T(C): 38.1 (2022 12:00), Max: 38.5 (2022 20:00)  T(F): 100.5 (2022 12:00), Max: 101.3 (2022 20:00)  HR: 123 (2022 13:00) (87 - 123)  BP: 95/78 (2022 13:00) (70/42 - 136/96)  BP(mean): 84 (2022 13:00) (52 - 109)  RR: 20 (2022 13:00) (15 - 25)  SpO2: 100% (2022 13:00) (98% - 100%)  Daily Height in cm: 162.56 (2022 02:10)    Daily Weight in k.1 (2022 02:10)    GENERAL:  No acute distress  HEENT:  NCAT, no scleral icterus  CHEST: no resp distress  HEART:  RRR  ABDOMEN:  Soft, non-tender, non-distended, normoactive bowel sounds, no masses  EXTREMITIES:  No cyanosis, clubbing, or edema  SKIN:  No rash/erythema/ecchymoses/petechiae/wounds/abscess/warm/dry  NEURO:  Alert and oriented x 3, no asterixis, no tremor    LABS:                        8.9    6.36  )-----------( 116      ( 2022 04:25 )             27.3     Mean Cell Volume: 102.6 fL (22 @ 04:25)        137  |  94<L>  |  5<L>  ----------------------------<  106<H>  3.1<L>   |  31  |  0.66    Ca    9.0      2022 04:47  Phos  2.7       Mg     1.60         TPro  7.0  /  Alb  3.8  /  TBili  1.3<H>  /  DBili  x   /  AST  53<H>  /  ALT  20  /  AlkPhos  124<H>      LIVER FUNCTIONS - ( 2022 04:47 )  Alb: 3.8 g/dL / Pro: 7.0 g/dL / ALK PHOS: 124 U/L / ALT: 20 U/L / AST: 53 U/L / GGT: x           PT/INR - ( 2022 04:25 )   PT: 12.2 sec;   INR: 1.05 ratio         PTT - ( 2022 04:25 )  PTT:26.8 sec  Urinalysis Basic - ( 2022 01:30 )    Color: Light Yellow / Appearance: Clear / S.011 / pH: x  Gluc: x / Ketone: Negative  / Bili: Negative / Urobili: <2 mg/dL   Blood: x / Protein: Negative / Nitrite: Negative   Leuk Esterase: Negative / RBC: x / WBC x   Sq Epi: x / Non Sq Epi: x / Bacteria: x            Imaging:  reviewed

## 2022-07-07 NOTE — CHART NOTE - NSCHARTNOTEFT_GEN_A_CORE
MAR Accept Note  Transfer to:  9S  Accepting Attending Physician:  Simran  Assigned Room:  96    Patient seen and examined.   Labs and data reviewed.   No findings precluding transfer of service.       HPI/MICU COURSE:   Please refer to MICU transfer note for full details. Briefly, this is a 52 yo M w/ PMH alcohol use, admitted from the ED 7/6 for AMS. Collateral info from chart and pt's sister Elzbieta ([phone]).  Pt had 1 day of AMS. Confusion, visual hallucinations, anxiety. Pt called his sister claiming there was a person in his home, but no one was there. Pt also reported seeing mice which were not there. No reports of increased diaphoresis, fever, palpitations, chest pain, SOB. Pt called 911 due to perceiving a person in his home, and was brought to the ED. Observed to be disoriented, restless, tremulous. There he was normotensive & afebrile w/ mild tachycardia to 101. UTox negative for alcohol. CT head normal. Received 2 mg ativan x2 in the ED, continued to be restless & confused. Found to have Hb 7.5 (repeat 7.8), no reported rectal bleeding or bloody vomiting. Last Hb 6/23 was 12.3. Of note, pt was previously a  working night shifts. On 6/23, while at work, he suffered a fall and went to the ED, discharged home. After being discharged home, he received notice that he was being fired for being intoxicated at work. Per sister, he was motivated to stop drinking after losing his job and had his last drink 5 days ago. Per sister, pt has a "drinking problem" and drinks beer & vodka (unknown # of drinks) to help him fall asleep during the day. She states that he has drank like this for ~1 year, prior to 1 year go he had lower alcohol intake.    No other known medical hx. He does not take any medications nor any substances other than alcohol & tobacco (smokes cigarettes, cigars). His sister states that he was hospitalized at [facility] ~2 years ago for "tuberculosis" but does not recall any details.      MICU Course    Patient was loaded with phenobarbital and started on precedex gtt (for a few hours). Hgb 7.5 in ED, family member had noticed BRB in sink and toilet 2 days prior to arrival in hospital. Required phenobarbital PRN here in the MICU. Patient had negative BARBARA in the MICU. After 24 hours became less agitated and confused. He is not requiring CO, and is eating a diet.  Did have a low grade fevers - for which bcx were sent. Blood consent in chart.       FOR FOLLOW-UP:  For Follow-Up:  [ ] F/U Bcx   [ ] Trend Hgb   [ ] F/U GI reccs.

## 2022-07-07 NOTE — PROGRESS NOTE ADULT - SUBJECTIVE AND OBJECTIVE BOX
OVERNIGHT EVENTS:    SUBJECTIVE: ***Patient seen and examined at bedside. Patient denies fever, chills, SOB, chest pain, N/V/D.    OBJECTIVE:    VITAL SIGNS:  ICU Vital Signs Last 24 Hrs  T(C): 37.5 (2022 04:00), Max: 38.5 (2022 20:00)  T(F): 99.5 (2022 04:00), Max: 101.3 (2022 20:00)  HR: 91 (2022 07:00) (87 - 123)  BP: 97/73 (2022 07:00) (70/42 - 150/138)  BP(mean): 79 (2022 07:00) (52 - 109)  ABP: --  ABP(mean): --  RR: 20 (2022 07:00) (15 - 25)  SpO2: 100% (2022 07:00) (95% - 100%)        07-06 @ 07:01  -  07-07 @ 07:00  --------------------------------------------------------  IN: 2250 mL / OUT: 1500 mL / NET: 750 mL        =================PHYSICAL EXAM=================    GENERAL: Laying comfortably, NAD  EYES: EOMI, PERRL, no scleral icterus  NECK: No JVD  LUNG: Clear to auscultation bilaterally; No wheeze, crackles or rhonci  HEART: Regular rate and rhythm; No murmurs, rubs, or gallops  ABDOMEN: Soft, Nontender, Nondistended  EXTREMITIES:  No LE edema, 2+ Peripheral Pulses, No clubbing, cyanosis, or edema  PSYCH: AAOx3  NEUROLOGY: non-focal, strength 5/5 in all extremities, sensation intact  SKIN: No rashes or lesions    =================================================    LABS:                        8.9    6.36  )-----------( 116      ( 2022 04:25 )             27.3     Auto Eosinophil # 0.10  / Auto Eosinophil % 1.6   / Auto Neutrophil # 4.38  / Auto Neutrophil % 68.9  / BANDS % x                            8.2    6.19  )-----------( 104      ( 2022 20:59 )             25.5     Auto Eosinophil # 0.09  / Auto Eosinophil % 1.5   / Auto Neutrophil # 3.99  / Auto Neutrophil % 64.4  / BANDS % x                            7.8    4.98  )-----------( 106      ( 2022 11:48 )             23.4     Auto Eosinophil # 0.06  / Auto Eosinophil % 1.2   / Auto Neutrophil # 2.92  / Auto Neutrophil % 58.6  / BANDS % x        07    137  |  94<L>  |  5<L>  ----------------------------<  106<H>  3.1<L>   |  31  |  0.66  07    138  |  95<L>  |  4<L>  ----------------------------<  132<H>  4.0   |  30  |  0.61  06    138  |  94<L>  |  6<L>  ----------------------------<  107<H>  2.8<LL>   |  33<H>  |  0.58    Ca    9.0      2022 04:47  Mg     1.60     0707  Phos  2.7     07-07  TPro  7.0  /  Alb  3.8  /  TBili  1.3<H>  /  DBili  x   /  AST  53<H>  /  ALT  20  /  AlkPhos  124<H>  07-07  TPro  6.5  /  Alb  3.5  /  TBili  1.3<H>  /  DBili  x   /  AST  61<H>  /  ALT  23  /  AlkPhos  118  07-06  TPro  6.8  /  Alb  3.9  /  TBili  1.2  /  DBili  x   /  AST  69<H>  /  ALT  26  /  AlkPhos  139<H>  07-    PT/INR - ( 2022 04:25 )   PT: 12.2 sec;   INR: 1.05 ratio         PTT - ( 2022 04:25 )  PTT:26.8 sec      Urinalysis Basic - ( 2022 01:30 )    Color: Light Yellow / Appearance: Clear / S.011 / pH: x  Gluc: x / Ketone: Negative  / Bili: Negative / Urobili: <2 mg/dL   Blood: x / Protein: Negative / Nitrite: Negative   Leuk Esterase: Negative / RBC: x / WBC x   Sq Epi: x / Non Sq Epi: x / Bacteria: x               MEDICATIONS:  MEDICATIONS  (STANDING):  chlorhexidine 4% Liquid 1 Application(s) Topical <User Schedule>  folic acid 1 milliGRAM(s) Oral daily  multivitamin 1 Tablet(s) Oral daily  pantoprazole  Injectable 40 milliGRAM(s) IV Push every 12 hours  potassium chloride  10 mEq/100 mL IVPB 10 milliEquivalent(s) IV Intermittent every 1 hour  thiamine IVPB 500 milliGRAM(s) IV Intermittent every 8 hours    MEDICATIONS  (PRN):  PHENobarbital Injectable 130 milliGRAM(s) IV Push every 15 minutes PRN Etoh Withdrawal      ALLERGIES:  Allergies    No Known Allergies    Intolerances        LABS:                        8.9    6.36  )-----------( 116      ( 2022 04:25 )             27.3     07-    137  |  94<L>  |  5<L>  ----------------------------<  106<H>  3.1<L>   |  31  |  0.66    Ca    9.0      2022 04:47  Phos  2.7     07-  Mg     1.60     07-    TPro  7.0  /  Alb  3.8  /  TBili  1.3<H>  /  DBili  x   /  AST  53<H>  /  ALT  20  /  AlkPhos  124<H>  07-07    PT/INR - ( 2022 04:25 )   PT: 12.2 sec;   INR: 1.05 ratio         PTT - ( 2022 04:25 )  PTT:26.8 sec  Urinalysis Basic - ( 2022 01:30 )    Color: Light Yellow / Appearance: Clear / S.011 / pH: x  Gluc: x / Ketone: Negative  / Bili: Negative / Urobili: <2 mg/dL   Blood: x / Protein: Negative / Nitrite: Negative   Leuk Esterase: Negative / RBC: x / WBC x   Sq Epi: x / Non Sq Epi: x / Bacteria: x        CAPILLARY BLOOD GLUCOSE          RADIOLOGY & ADDITIONAL TESTS: Reviewed. OVERNIGHT EVENTS: No acute events. No agitation. Did not require phenobarbital prns overnight    SUBJECTIVE: Patient seen and examined at bedside. States that he feels "normal" today. Patient denies fever, chills, SOB, chest pain, N/V/D. Denies recent GI bleeding despite family member report    OBJECTIVE:    VITAL SIGNS:  ICU Vital Signs Last 24 Hrs  T(C): 37.5 (2022 04:00), Max: 38.5 (2022 20:00)  T(F): 99.5 (2022 04:00), Max: 101.3 (2022 20:00)  HR: 91 (2022 07:00) (87 - 123)  BP: 97/73 (2022 07:00) (70/42 - 150/138)  BP(mean): 79 (2022 07:00) (52 - 109)  ABP: --  ABP(mean): --  RR: 20 (2022 07:00) (15 - 25)  SpO2: 100% (2022 07:00) (95% - 100%)        07-06 @ 07:01  -  07-07 @ 07:00  --------------------------------------------------------  IN: 2250 mL / OUT: 1500 mL / NET: 750 mL        =================PHYSICAL EXAM=================    GENERAL: Laying comfortably, NAD. Alert & awake today  EYES: EOMI, PERRL, no scleral icterus  NECK: No JVD  LUNG: Clear to auscultation bilaterally; No wheeze, crackles or rhonci  HEART: Regular rate and rhythm; No murmurs, rubs, or gallops  ABDOMEN: Soft, Nontender, Nondistended  EXTREMITIES:  No LE edema, 2+ Peripheral Pulses, No clubbing, cyanosis, or edema  PSYCH: AAOx3. Poor historian  NEUROLOGY: non-focal  SKIN: No rashes or lesions    =================================================    LABS:                        8.9    6.36  )-----------( 116      ( 2022 04:25 )             27.3     Auto Eosinophil # 0.10  / Auto Eosinophil % 1.6   / Auto Neutrophil # 4.38  / Auto Neutrophil % 68.9  / BANDS % x                            8.2    6.19  )-----------( 104      ( 2022 20:59 )             25.5     Auto Eosinophil # 0.09  / Auto Eosinophil % 1.5   / Auto Neutrophil # 3.99  / Auto Neutrophil % 64.4  / BANDS % x                            7.8    4.98  )-----------( 106      ( 2022 11:48 )             23.4     Auto Eosinophil # 0.06  / Auto Eosinophil % 1.2   / Auto Neutrophil # 2.92  / Auto Neutrophil % 58.6  / BANDS % x        07    137  |  94<L>  |  5<L>  ----------------------------<  106<H>  3.1<L>   |  31  |  0.66      138  |  95<L>  |  4<L>  ----------------------------<  132<H>  4.0   |  30  |  0.61      138  |  94<L>  |  6<L>  ----------------------------<  107<H>  2.8<LL>   |  33<H>  |  0.58    Ca    9.0      2022 04:47  Mg     1.60       Phos  2.7       TPro  7.0  /  Alb  3.8  /  TBili  1.3<H>  /  DBili  x   /  AST  53<H>  /  ALT  20  /  AlkPhos  124<H>    TPro  6.5  /  Alb  3.5  /  TBili  1.3<H>  /  DBili  x   /  AST  61<H>  /  ALT  23  /  AlkPhos  118  07-  TPro  6.8  /  Alb  3.9  /  TBili  1.2  /  DBili  x   /  AST  69<H>  /  ALT  26  /  AlkPhos  139<H>  07-    PT/INR - ( 2022 04:25 )   PT: 12.2 sec;   INR: 1.05 ratio         PTT - ( 2022 04:25 )  PTT:26.8 sec      Urinalysis Basic - ( 2022 01:30 )    Color: Light Yellow / Appearance: Clear / S.011 / pH: x  Gluc: x / Ketone: Negative  / Bili: Negative / Urobili: <2 mg/dL   Blood: x / Protein: Negative / Nitrite: Negative   Leuk Esterase: Negative / RBC: x / WBC x   Sq Epi: x / Non Sq Epi: x / Bacteria: x               MEDICATIONS:  MEDICATIONS  (STANDING):  chlorhexidine 4% Liquid 1 Application(s) Topical <User Schedule>  folic acid 1 milliGRAM(s) Oral daily  multivitamin 1 Tablet(s) Oral daily  pantoprazole  Injectable 40 milliGRAM(s) IV Push every 12 hours  potassium chloride  10 mEq/100 mL IVPB 10 milliEquivalent(s) IV Intermittent every 1 hour  thiamine IVPB 500 milliGRAM(s) IV Intermittent every 8 hours    MEDICATIONS  (PRN):  PHENobarbital Injectable 130 milliGRAM(s) IV Push every 15 minutes PRN Etoh Withdrawal      ALLERGIES:  Allergies    No Known Allergies    Intolerances        LABS:                        8.9    6.36  )-----------( 116      ( 2022 04:25 )             27.3     07-07    137  |  94<L>  |  5<L>  ----------------------------<  106<H>  3.1<L>   |  31  |  0.66    Ca    9.0      2022 04:47  Phos  2.7     07-  Mg     1.60     07-    TPro  7.0  /  Alb  3.8  /  TBili  1.3<H>  /  DBili  x   /  AST  53<H>  /  ALT  20  /  AlkPhos  124<H>  07-07    PT/INR - ( 2022 04:25 )   PT: 12.2 sec;   INR: 1.05 ratio         PTT - ( 2022 04:25 )  PTT:26.8 sec  Urinalysis Basic - ( 2022 01:30 )    Color: Light Yellow / Appearance: Clear / S.011 / pH: x  Gluc: x / Ketone: Negative  / Bili: Negative / Urobili: <2 mg/dL   Blood: x / Protein: Negative / Nitrite: Negative   Leuk Esterase: Negative / RBC: x / WBC x   Sq Epi: x / Non Sq Epi: x / Bacteria: x        CAPILLARY BLOOD GLUCOSE          RADIOLOGY & ADDITIONAL TESTS: Reviewed.

## 2022-07-07 NOTE — PROGRESS NOTE ADULT - ASSESSMENT
Mr. Syeda Johnson is a 52 yo M w/ PMH alcohol use, admitted from the ED 7/6 for AMS.    ===========NEURO============  #EtOH withdrawal  - UTox negative for alcohol, pt family reports last drink was 5 days ago, intoxication unlikely.  - Does report fall recently (~2 weeks ago), however CTH negative in ED on 7/6  - Restless, tremulous in the ED even after 2x 2mg ativan  - Loaded w/ 600 mg phenobarb IV & started on precedex on transfer to MICU, somnolent afterward.  - Precedex discontinued, no further agitation/restlessness and not requiring phenobarb. Awake, A&Ox3. Constant obs discontinued  - thiamine/folate    ===========CV===========  #Tachycardia  - Persistent mild tachycardia to 100s. Became hypotensive to 79/62 overnight, recheck 83/64, no fluids given. Pt asymptomatic  - Pressures soft throughout admission. Received 1 L LR, 1 L NS in the ED on arrival  - continue cbc q12, monitor for bleeding    ===========PULM==============  No respiratory distress. Sating well RA. No active issues    ===========GI==============  Diet: regular    #Alcohol use disorder  - Unclear amount of drinking, however per report drinks beer & vodka daily.  - No known hx of liver disease  - AST 69, ALT 26. . TBili 1.2. INR 1.05. No evidence of acute alcohol hepatitis  - Mg 0.9 on arrival. Repleted 4 g    #?GI bleed  - Hb 7.5, repeat 7.8. Prev Hb 2 weeks ago was 12.3  - initially no reports of bleeding, pt denies any bloody vomiting or stools. However pt family member reported seeing blood in toilet & sink 2 days prior to admission  - started on IV PPI BID 7/6  - cbc q12, transfuse Hb <7  - GI consulted, recs appreciated    ===========/RENAL==============  #Hypokalemia  - K 3.2 on admission, 2.8 on arrival to ICU  - Repleted 60 mg total IV  - Replete as needed    ===========ID==============  #fever  - Low fever to 100.2  - WBC 4.98  - UA negative  - 7/6 BCx pending    ===========HEME==============  #Anemia  - Hb 7.5, repeat 7.8. Prev Hb 2 weeks ago was 12.3  - Pt denies BRBPR, black stools, bloody vomiting. However family member reported seeing blood in toilet & sink 2 days prior to admission  - 7/6 BARBARA negative  - Hb 8.9 on 7/7 am    ===========ENDO==============  BG wnl, no hx DM. No active issues    ===========PPX==============  DVT ppx: Hold for now in setting of anemia  Decubiti: None  Code status: Full code     Mr. Syeda Johnson is a 50 yo M w/ PMH alcohol use, admitted from the ED 7/6 for AMS.           Mr. Syeda Johnson is a 50 yo M w/ PMH of TB and alcohol use, admitted from the ED 7/6 for AMS, he started drinking when he was 21, going through 1.5 bottles of vodka every week, has poor appetite, denied seizures, or prior admissions for alcohol related problem.

## 2022-07-07 NOTE — PROGRESS NOTE ADULT - PROBLEM SELECTOR PLAN 6
-low grade fever  -bx sent and f/u  -zoysn for empiric coverage till culture is clear DVT ppx: Hold for now in setting of anemia  Decubiti: None  Code status: Full code

## 2022-07-07 NOTE — PROGRESS NOTE ADULT - PROBLEM SELECTOR PLAN 2
- Hb 7.5, repeat 7.8. Prev Hb 2 weeks ago was 12.3  - initially no reports of bleeding, pt denies any bloody vomiting or stools. However pt family member reported seeing blood in toilet & sink 2 days prior to admission  - started on IV PPI BID 7/6  - cbc q12, transfuse Hb <7  - GI consulted, recs appreciated - Hb 7.5, repeat 7.8. Prev Hb 2 weeks ago was 12.3  - initially no reports of bleeding, pt denies any bloody vomiting or stools. However pt family member reported seeing blood in toilet & sink 2 days prior to admission  - started on IV PPI BID 7/6  - cbc q12, transfuse Hb <7  - GI consulted, Anemia, macrocytic. Hgb stable. Patient denies hematochezia, melena. Given concomitant thrombocytopenia, likely in setting of chronic alcohol use  - send iron studies  - no plan for GI intervention at this time - Likely withdrawl symptoms hallucinosis vs DT  -- Low fever to 100.2,WBC 4.98, UA negative, 7/6 BCx pending, he was put on zoysn.   - UTox negative for alcohol, pt family reports last drink was 5 days ago, intoxication unlikely.  - Does report fall recently (~2 weeks ago), however CTH negative in ED on 7/6  - Loaded w/ 600 mg phenobarb IV & started on precedex on transfer to MICU, somnolent afterward.  - Precedex discontinued, no further agitation/restlessness and not requiring phenobarb. Awake, A&Ox3. Constant obs discontinued  - thiamine/folate  - No known hx of liver disease  - AST 69, ALT 26. . TBili 1.2. INR 1.05. No evidence of acute alcohol hepatitis

## 2022-07-07 NOTE — PROGRESS NOTE ADULT - PROBLEM SELECTOR PLAN 1
- Likely withdrawl symptoms hallucinosis vs DT  -- Low fever to 100.2,WBC 4.98, UA negative, 7/6 BCx pending  - UTox negative for alcohol, pt family reports last drink was 5 days ago, intoxication unlikely.  - Does report fall recently (~2 weeks ago), however CTH negative in ED on 7/6  - Restless, tremulous in the ED even after 2x 2mg ativan  - Loaded w/ 600 mg phenobarb IV & started on precedex on transfer to MICU, somnolent afterward.  - Precedex discontinued, no further agitation/restlessness and not requiring phenobarb. Awake, A&Ox3. Constant obs discontinued  - thiamine/folate  - Unclear amount of drinking, however per report drinks beer & vodka daily.  - No known hx of liver disease  - AST 69, ALT 26. . TBili 1.2. INR 1.05. No evidence of acute alcohol hepatitis -low grade fever  -bx sent and f/u  -zoysn for empiric coverage till culture is clear

## 2022-07-07 NOTE — PROGRESS NOTE ADULT - SUBJECTIVE AND OBJECTIVE BOX
PROGRESS NOTE:   Authored by Dr. Zechariah Rausch  Patient is a 51y old  Male who presents with a chief complaint of ETOH Withdrawal (2022 13:20)      SUBJECTIVE / OVERNIGHT EVENTS:    ADDITIONAL REVIEW OF SYSTEMS:    MEDICATIONS  (STANDING):  folic acid 1 milliGRAM(s) Oral daily  lactated ringers Bolus 1000 milliLiter(s) IV Bolus once  multivitamin 1 Tablet(s) Oral daily  pantoprazole  Injectable 40 milliGRAM(s) IV Push every 12 hours  thiamine IVPB 500 milliGRAM(s) IV Intermittent every 8 hours    MEDICATIONS  (PRN):  acetaminophen     Tablet .. 650 milliGRAM(s) Oral every 6 hours PRN Temp greater or equal to 38C (100.4F)      CAPILLARY BLOOD GLUCOSE        I&O's Summary    2022 07:01  -  2022 07:00  --------------------------------------------------------  IN: 2250 mL / OUT: 1500 mL / NET: 750 mL    2022 07:01  -  2022 17:08  --------------------------------------------------------  IN: 950 mL / OUT: 425 mL / NET: 525 mL        PHYSICAL EXAM:  Vital Signs Last 24 Hrs  T(C): 37.6 (2022 16:00), Max: 38.5 (2022 20:00)  T(F): 99.6 (2022 16:00), Max: 101.3 (2022 20:00)  HR: 93 (2022 17:00) (87 - 123)  BP: 83/59 (2022 17:00) (70/42 - 113/72)  BP(mean): 67 (2022 17:00) (52 - 90)  RR: 18 (2022 17:00) (15 - 25)  SpO2: 100% (2022 17:00) (98% - 100%)    Parameters below as of 2022 06:00  Patient On (Oxygen Delivery Method): nasal cannula  O2 Flow (L/min): 2      GENERAL: NAD, lying comfortably in bed  HEAD: Atraumatic, normocephalic  EYES: EOMI b/l PERRLA b/l, conjunctiva and sclera clear  NECK: Supple, No JVD, No LAD  RESPIRATORY: Normal respiratory effort; lungs are clear to auscultation bilaterally  CARDIOVASCULAR: Regular rate and rhythm, normal S1 and S2, no murmur/rub/gallop; No lower extremity edema  ABDOMEN: Nontender, normoactive bowel sounds, no rebound/guarding; No hepatosplenomegaly  MUSCULOSKELETAL: no clubbing or cyanosis of digits; no joint swelling or tenderness to palpation  NEURO: Non focal   PSYCH: A+O to person, place, and time; affect appropriate    LABS:                        7.5    5.85  )-----------( 108      ( 2022 16:12 )             23.7     07-07    132<L>  |  97<L>  |  7   ----------------------------<  117<H>  3.9   |  27  |  0.83    Ca    8.5      2022 16:12  Phos  2.7     07-07  Mg     1.60     07-07    TPro  5.8<L>  /  Alb  2.9<L>  /  TBili  0.9  /  DBili  x   /  AST  44<H>  /  ALT  20  /  AlkPhos  101  07-07    PT/INR - ( 2022 04:25 )   PT: 12.2 sec;   INR: 1.05 ratio         PTT - ( 2022 04:25 )  PTT:26.8 sec      Urinalysis Basic - ( 2022 01:30 )    Color: Light Yellow / Appearance: Clear / S.011 / pH: x  Gluc: x / Ketone: Negative  / Bili: Negative / Urobili: <2 mg/dL   Blood: x / Protein: Negative / Nitrite: Negative   Leuk Esterase: Negative / RBC: x / WBC x   Sq Epi: x / Non Sq Epi: x / Bacteria: x         PROGRESS NOTE:   Authored by Dr. Zechariah Rausch  Patient is a 51y old  Male who presents with a chief complaint of ETOH Withdrawal (2022 13:20)      SUBJECTIVE / OVERNIGHT EVENTS:  ON, he had a low grade fever, a bx was sent, he was put on empiric abx zoysn. he is feeling calm and wondering when he can go home. denied anxiety, tremors, hallucinations, sob, cp, palpitation     ADDITIONAL REVIEW OF SYSTEMS:  Review of Systems:  Constitutional: No fever, No weight loss, poor appetite/po intake  Head: No headache   Eyes: No blurry vision, No diplopia  Neuro: No tremors, No muscle weakness   Cardiovascular: No chest pain, No palpitations  Respiratory: No SOB, No cough  GI: No nausea, No vomiting, No diarrhea  : No dysuria, No hematuria  Skin: No rash  MSK: No joint pain   Psych: No depression  Heme: No abnormal bruising, no abnormal bleeding      MEDICATIONS  (STANDING):  folic acid 1 milliGRAM(s) Oral daily  lactated ringers Bolus 1000 milliLiter(s) IV Bolus once  multivitamin 1 Tablet(s) Oral daily  pantoprazole  Injectable 40 milliGRAM(s) IV Push every 12 hours  thiamine IVPB 500 milliGRAM(s) IV Intermittent every 8 hours    MEDICATIONS  (PRN):  acetaminophen     Tablet .. 650 milliGRAM(s) Oral every 6 hours PRN Temp greater or equal to 38C (100.4F)      CAPILLARY BLOOD GLUCOSE        I&O's Summary    2022 07:01  -  2022 07:00  --------------------------------------------------------  IN: 2250 mL / OUT: 1500 mL / NET: 750 mL    2022 07:01  -  2022 17:08  --------------------------------------------------------  IN: 950 mL / OUT: 425 mL / NET: 525 mL        PHYSICAL EXAM:  Vital Signs Last 24 Hrs  T(C): 37.6 (2022 16:00), Max: 38.5 (2022 20:00)  T(F): 99.6 (2022 16:00), Max: 101.3 (2022 20:00)  HR: 93 (2022 17:00) (87 - 123)  BP: 83/59 (2022 17:00) (70/42 - 113/72)  BP(mean): 67 (2022 17:00) (52 - 90)  RR: 18 (2022 17:00) (15 - 25)  SpO2: 100% (2022 17:00) (98% - 100%)    Parameters below as of 2022 06:00  Patient On (Oxygen Delivery Method): nasal cannula  O2 Flow (L/min): 2      GENERAL: NAD, lying comfortably in bed, easily in tears, otherwise AO*3.   HEAD: Atraumatic, normocephalic  EYES: EOMI b/l PERRLA b/l, conjunctiva and sclera clear  NECK: Supple, No JVD, No LAD  RESPIRATORY: Normal respiratory effort; lungs are clear to auscultation bilaterally  CARDIOVASCULAR: Regular rate and rhythm, normal S1 and S2, no murmur/rub/gallop; No lower extremity edema  ABDOMEN: Nontender, distended, normoactive bowel sounds, no rebound/guarding; No hepatosplenomegaly  MUSCULOSKELETAL: no clubbing or cyanosis of digits; no joint swelling or tenderness to palpation  NEURO: Non focal, no tremor or asterixis.   PSYCH: A+O to person, place, and time; affect appropriate    LABS:                        7.5    5.85  )-----------( 108      ( 2022 16:12 )             23.7     07-07    132<L>  |  97<L>  |  7   ----------------------------<  117<H>  3.9   |  27  |  0.83    Ca    8.5      2022 16:12  Phos  2.7     07-07  Mg     1.60     07-07    TPro  5.8<L>  /  Alb  2.9<L>  /  TBili  0.9  /  DBili  x   /  AST  44<H>  /  ALT  20  /  AlkPhos  101      PT/INR - ( 2022 04:25 )   PT: 12.2 sec;   INR: 1.05 ratio         PTT - ( 2022 04:25 )  PTT:26.8 sec      Urinalysis Basic - ( 2022 01:30 )    Color: Light Yellow / Appearance: Clear / S.011 / pH: x  Gluc: x / Ketone: Negative  / Bili: Negative / Urobili: <2 mg/dL   Blood: x / Protein: Negative / Nitrite: Negative   Leuk Esterase: Negative / RBC: x / WBC x   Sq Epi: x / Non Sq Epi: x / Bacteria: x         Otezla Counseling: The side effects of Otezla were discussed with the patient, including but not limited to worsening or new depression, weight loss, diarrhea, nausea, upper respiratory tract infection, and headache. Patient instructed to call the office should any adverse effect occur.  The patient verbalized understanding of the proper use and possible adverse effects of Otezla.  All the patient's questions and concerns were addressed.

## 2022-07-08 LAB
ALBUMIN SERPL ELPH-MCNC: 2.8 G/DL — LOW (ref 3.3–5)
ALP SERPL-CCNC: 103 U/L — SIGNIFICANT CHANGE UP (ref 40–120)
ALT FLD-CCNC: 17 U/L — SIGNIFICANT CHANGE UP (ref 4–41)
ANION GAP SERPL CALC-SCNC: 10 MMOL/L — SIGNIFICANT CHANGE UP (ref 7–14)
AST SERPL-CCNC: 28 U/L — SIGNIFICANT CHANGE UP (ref 4–40)
BASOPHILS # BLD AUTO: 0.01 K/UL — SIGNIFICANT CHANGE UP (ref 0–0.2)
BASOPHILS NFR BLD AUTO: 0.2 % — SIGNIFICANT CHANGE UP (ref 0–2)
BILIRUB SERPL-MCNC: 0.7 MG/DL — SIGNIFICANT CHANGE UP (ref 0.2–1.2)
BUN SERPL-MCNC: 6 MG/DL — LOW (ref 7–23)
CALCIUM SERPL-MCNC: 8.2 MG/DL — LOW (ref 8.4–10.5)
CHLORIDE SERPL-SCNC: 100 MMOL/L — SIGNIFICANT CHANGE UP (ref 98–107)
CO2 SERPL-SCNC: 27 MMOL/L — SIGNIFICANT CHANGE UP (ref 22–31)
CREAT SERPL-MCNC: 0.6 MG/DL — SIGNIFICANT CHANGE UP (ref 0.5–1.3)
EGFR: 118 ML/MIN/1.73M2 — SIGNIFICANT CHANGE UP
EOSINOPHIL # BLD AUTO: 0.15 K/UL — SIGNIFICANT CHANGE UP (ref 0–0.5)
EOSINOPHIL NFR BLD AUTO: 2.8 % — SIGNIFICANT CHANGE UP (ref 0–6)
GLUCOSE SERPL-MCNC: 120 MG/DL — HIGH (ref 70–99)
HCT VFR BLD CALC: 21.9 % — LOW (ref 39–50)
HCT VFR BLD CALC: 24.4 % — LOW (ref 39–50)
HGB BLD-MCNC: 7.1 G/DL — LOW (ref 13–17)
HGB BLD-MCNC: 8 G/DL — LOW (ref 13–17)
IANC: 3.05 K/UL — SIGNIFICANT CHANGE UP (ref 1.8–7.4)
IMM GRANULOCYTES NFR BLD AUTO: 0.9 % — SIGNIFICANT CHANGE UP (ref 0–1.5)
LYMPHOCYTES # BLD AUTO: 0.91 K/UL — LOW (ref 1–3.3)
LYMPHOCYTES # BLD AUTO: 17.3 % — SIGNIFICANT CHANGE UP (ref 13–44)
MAGNESIUM SERPL-MCNC: 1.8 MG/DL — SIGNIFICANT CHANGE UP (ref 1.6–2.6)
MCHC RBC-ENTMCNC: 32.4 GM/DL — SIGNIFICANT CHANGE UP (ref 32–36)
MCHC RBC-ENTMCNC: 32.7 PG — SIGNIFICANT CHANGE UP (ref 27–34)
MCV RBC AUTO: 100.9 FL — HIGH (ref 80–100)
MONOCYTES # BLD AUTO: 1.1 K/UL — HIGH (ref 0–0.9)
MONOCYTES NFR BLD AUTO: 20.9 % — HIGH (ref 2–14)
NEUTROPHILS # BLD AUTO: 3.05 K/UL — SIGNIFICANT CHANGE UP (ref 1.8–7.4)
NEUTROPHILS NFR BLD AUTO: 57.9 % — SIGNIFICANT CHANGE UP (ref 43–77)
NRBC # BLD: 0 /100 WBCS — SIGNIFICANT CHANGE UP
NRBC # FLD: 0 K/UL — SIGNIFICANT CHANGE UP
PHOSPHATE SERPL-MCNC: 2.8 MG/DL — SIGNIFICANT CHANGE UP (ref 2.5–4.5)
PLATELET # BLD AUTO: 118 K/UL — LOW (ref 150–400)
POTASSIUM SERPL-MCNC: 3.2 MMOL/L — LOW (ref 3.5–5.3)
POTASSIUM SERPL-SCNC: 3.2 MMOL/L — LOW (ref 3.5–5.3)
PROT SERPL-MCNC: 5.8 G/DL — LOW (ref 6–8.3)
RBC # BLD: 2.17 M/UL — LOW (ref 4.2–5.8)
RBC # FLD: 13.6 % — SIGNIFICANT CHANGE UP (ref 10.3–14.5)
SODIUM SERPL-SCNC: 137 MMOL/L — SIGNIFICANT CHANGE UP (ref 135–145)
WBC # BLD: 5.27 K/UL — SIGNIFICANT CHANGE UP (ref 3.8–10.5)
WBC # FLD AUTO: 5.27 K/UL — SIGNIFICANT CHANGE UP (ref 3.8–10.5)

## 2022-07-08 PROCEDURE — 99233 SBSQ HOSP IP/OBS HIGH 50: CPT | Mod: GC

## 2022-07-08 RX ORDER — POTASSIUM CHLORIDE 20 MEQ
40 PACKET (EA) ORAL ONCE
Refills: 0 | Status: COMPLETED | OUTPATIENT
Start: 2022-07-08 | End: 2022-07-08

## 2022-07-08 RX ADMIN — Medication 105 MILLIGRAM(S): at 14:22

## 2022-07-08 RX ADMIN — PANTOPRAZOLE SODIUM 40 MILLIGRAM(S): 20 TABLET, DELAYED RELEASE ORAL at 06:00

## 2022-07-08 RX ADMIN — PIPERACILLIN AND TAZOBACTAM 25 GRAM(S): 4; .5 INJECTION, POWDER, LYOPHILIZED, FOR SOLUTION INTRAVENOUS at 10:34

## 2022-07-08 RX ADMIN — Medication 1 MILLIGRAM(S): at 12:12

## 2022-07-08 RX ADMIN — Medication 1 PATCH: at 12:13

## 2022-07-08 RX ADMIN — Medication 105 MILLIGRAM(S): at 05:50

## 2022-07-08 RX ADMIN — Medication 40 MILLIEQUIVALENT(S): at 09:22

## 2022-07-08 RX ADMIN — Medication 1 PATCH: at 18:37

## 2022-07-08 RX ADMIN — Medication 1 TABLET(S): at 12:12

## 2022-07-08 RX ADMIN — Medication 250 MILLIGRAM(S): at 02:30

## 2022-07-08 RX ADMIN — PANTOPRAZOLE SODIUM 40 MILLIGRAM(S): 20 TABLET, DELAYED RELEASE ORAL at 17:56

## 2022-07-08 RX ADMIN — Medication 1 PATCH: at 07:19

## 2022-07-08 RX ADMIN — Medication 1 PATCH: at 18:38

## 2022-07-08 RX ADMIN — PIPERACILLIN AND TAZOBACTAM 25 GRAM(S): 4; .5 INJECTION, POWDER, LYOPHILIZED, FOR SOLUTION INTRAVENOUS at 02:30

## 2022-07-08 RX ADMIN — PIPERACILLIN AND TAZOBACTAM 25 GRAM(S): 4; .5 INJECTION, POWDER, LYOPHILIZED, FOR SOLUTION INTRAVENOUS at 17:56

## 2022-07-08 RX ADMIN — Medication 105 MILLIGRAM(S): at 22:18

## 2022-07-08 NOTE — SBIRT NOTE ADULT - NSSBIRTUNABLESCROTHER_GEN_A_CORE
Patient refused to respond to questions and stated "I'm fine now and do not need to answer any questions and don't need any help at all. I have quit and am not going to use alcohol again."

## 2022-07-08 NOTE — PROGRESS NOTE ADULT - PROBLEM SELECTOR PROBLEM 4
Disorder of electrolytes Principal Discharge DX:	Hypoxemia requiring supplemental oxygen Principal Discharge DX:	Coronavirus infection  Secondary Diagnosis:	Hypoxemia requiring supplemental oxygen  Secondary Diagnosis:	Pneumonia  Secondary Diagnosis:	Dyspnea Normal rate, regular rhythm.  Heart sounds S1, S2.  No murmurs, rubs or gallops.

## 2022-07-08 NOTE — DIETITIAN INITIAL EVALUATION ADULT - ADD RECOMMEND
1) Continue on Regular diet  2)  dept to provide Vital Health Shake 2x daily (1040 guicho and 44 gm protein) to promote optimal PO intake and wt gain  3) Continue Multivitamin once daily for micronutrient provisions  4) Encourage PO intake and honor food preferences as able.   5) Continue to Monitor weights, labs, BM's, skin integrity, p.o. intake.  6) Reconsult RD as needed.

## 2022-07-08 NOTE — PROGRESS NOTE ADULT - PROBLEM SELECTOR PLAN 1
-low grade fever  -bx sent and f/u  -zoysn for empiric coverage till culture is clear - fever 07/06   -bx NGT  -zoysn for empiric coverage till culture is clear in a total course of 5 days

## 2022-07-08 NOTE — PROGRESS NOTE ADULT - PROBLEM SELECTOR PLAN 3
- Hb 7.5, repeat 8.3. Prev Hb 2 weeks ago was 12.3  - initially no reports of bleeding, pt denies any bloody vomiting or stools. However pt family member reported seeing blood in toilet & sink 2 days prior to admission  - started on IV PPI BID 7/6  - cbc q12, transfuse Hb <7  - GI consulted, Anemia, macrocytic. Hgb stable. Patient denies hematochezia, melena. Given concomitant thrombocytopenia, likely in setting of chronic alcohol use  - no plan for GI intervention at this time - Hb 7.1 this am Prev Hb 2 weeks ago was 12.3  - initially no reports of bleeding, pt denies any bloody vomiting or stools. However pt family member reported seeing blood in toilet & sink 2 days prior to admission  - started on IV PPI BID 7/6  - cbc q12, transfuse Hb <7  - GI consulted, Anemia, macrocytic. Hgb stable. Patient denies hematochezia, melena. Given concomitant thrombocytopenia, likely in setting of chronic alcohol use  - no plan for GI intervention at this time - Hb 7.1 this am, Prev Hb 2 weeks ago was 12.3  - initially no reports of bleeding, pt denies any bloody vomiting or stools. However pt family member reported seeing blood in toilet & sink 2 days prior to admission  - started on IV PPI BID 7/6  - cbc q12, transfuse Hb <7  - GI consulted, Anemia, macrocytic. Hgb stable. Patient denies hematochezia, melena. Given concomitant thrombocytopenia, likely in setting of chronic alcohol use  - no plan for GI intervention at this time  - H/H monitoring

## 2022-07-08 NOTE — PROGRESS NOTE ADULT - PROBLEM SELECTOR PLAN 2
- Likely withdrawl symptoms hallucinosis vs DT  -- Low fever to 100.2,WBC 4.98, UA negative, 7/6 BCx pending, he was put on zoysn.   - UTox negative for alcohol, pt family reports last drink was 5 days ago, intoxication unlikely.  - Does report fall recently (~2 weeks ago), however CTH negative in ED on 7/6  - Loaded w/ 600 mg phenobarb IV & started on precedex on transfer to MICU, somnolent afterward.  - Precedex discontinued, no further agitation/restlessness and not requiring phenobarb. Awake, A&Ox3. Constant obs discontinued  - thiamine/folate  - No known hx of liver disease  - AST 69, ALT 26. . TBili 1.2. INR 1.05. No evidence of acute alcohol hepatitis - Likely withdrawl symptoms hallucinosis vs DT  -- Low fever to 100.2,WBC 4.98, UA negative, 7/6 BCx NGT, he was put on zoysn.   - Does report fall recently (~2 weeks ago), however CTH negative in ED on 7/6  - Loaded w/ 600 mg phenobarb IV & started on precedex on transfer to MICU, somnolent afterward.  - Precedex discontinued, no further agitation/restlessness and not requiring phenobarb.  - thiamine 500mg IVPB /folate  - AST 69, ALT 26. . TBili 1.2. INR 1.05. No evidence of acute alcohol hepatitis  - Put on CIWA high risk protocol  - SBIRT consult: patient uncooperative  - PT consult for unsteady gait

## 2022-07-08 NOTE — PROGRESS NOTE ADULT - PROBLEM SELECTOR PLAN 5
- Persistent mild tachycardia to 100s. Became hypotensive to 79/62 overnight, recheck 83/64, no fluids given. Pt asymptomatic  - Pressures soft throughout admission. Received 1 L LR, 1 L NS in the ED on arrival, got another litter in the MICU ON 07/07  - continue cbc q12, monitor for bleeding - Persistent mild tachycardia to 100s.  his bp is 105/76, Received 1 L LR, 1 L NS in the ED on arrival. s/p 2L yestereday. likely due to phenobarbital use.  Pt asymptomatic, he was given midodrine 5mg.    - Pressures soft throughout admission.   - continue cbc q12, monitor for bleeding - Persistent mild tachycardia to 100s.  his bp is 105/76, Received 1 L LR, 1 L NS in the ED on arrival. s/p 2L yestereday. likely due to phenobarbital use.  Pt asymptomatic, he was given midodrine 5mg.    - Pressures soft throughout admission.   - continue cbc q12, monitor for bleeding  - vital check

## 2022-07-08 NOTE — PROGRESS NOTE ADULT - ASSESSMENT
Mr. Syeda Johnson is a 52 yo M w/ PMH of TB and alcohol use, admitted from the ED 7/6 for AMS, he started drinking when he was 21, going through 1.5 bottles of vodka every week, has poor appetite, denied seizures, or prior admissions for alcohol related problem.

## 2022-07-08 NOTE — PROGRESS NOTE ADULT - SUBJECTIVE AND OBJECTIVE BOX
PROGRESS NOTE:   Authored by Dr. Zechariah Rausch  Patient is a 51y old  Male who presents with a chief complaint of ETOH Withdrawal (2022 07:10)      SUBJECTIVE / OVERNIGHT EVENTS:    ADDITIONAL REVIEW OF SYSTEMS:    MEDICATIONS  (STANDING):  folic acid 1 milliGRAM(s) Oral daily  multivitamin 1 Tablet(s) Oral daily  nicotine -   7 mG/24Hr(s) Patch 1 Patch Transdermal daily  pantoprazole  Injectable 40 milliGRAM(s) IV Push every 12 hours  piperacillin/tazobactam IVPB.. 3.375 Gram(s) IV Intermittent every 8 hours  potassium chloride   Powder 40 milliEquivalent(s) Oral once  thiamine IVPB 500 milliGRAM(s) IV Intermittent every 8 hours    MEDICATIONS  (PRN):  acetaminophen     Tablet .. 650 milliGRAM(s) Oral every 6 hours PRN Temp greater or equal to 38C (100.4F)      CAPILLARY BLOOD GLUCOSE        I&O's Summary    2022 07:01  -  2022 07:00  --------------------------------------------------------  IN: 3610 mL / OUT: 1875 mL / NET: 1735 mL        PHYSICAL EXAM:  Vital Signs Last 24 Hrs  T(C): 37.3 (2022 05:58), Max: 38.1 (2022 12:00)  T(F): 99.2 (2022 05:58), Max: 100.5 (2022 12:00)  HR: 92 (2022 05:58) (91 - 123)  BP: 101/62 (2022 05:58) (76/57 - 105/76)  BP(mean): 87 (2022 22:00) (65 - 87)  RR: 17 (2022 05:58) (13 - 23)  SpO2: 100% (2022 05:58) (98% - 100%)    Parameters below as of 2022 05:58  Patient On (Oxygen Delivery Method): room air        GENERAL: NAD, lying comfortably in bed  HEAD: Atraumatic, normocephalic  EYES: EOMI b/l PERRLA b/l, conjunctiva and sclera clear  NECK: Supple, No JVD, No LAD  RESPIRATORY: Normal respiratory effort; lungs are clear to auscultation bilaterally  CARDIOVASCULAR: Regular rate and rhythm, normal S1 and S2, no murmur/rub/gallop; No lower extremity edema  ABDOMEN: Nontender, normoactive bowel sounds, no rebound/guarding; No hepatosplenomegaly  MUSCULOSKELETAL: no clubbing or cyanosis of digits; no joint swelling or tenderness to palpation  NEURO: Non focal   PSYCH: A+O to person, place, and time; affect appropriate    LABS:                        7.1    5.27  )-----------( 118      ( 2022 06:00 )             21.9     07-08    137  |  100  |  6<L>  ----------------------------<  120<H>  3.2<L>   |  27  |  0.60    Ca    8.2<L>      2022 06:00  Phos  2.8     07-08  Mg     1.80     07-08    TPro  5.8<L>  /  Alb  2.8<L>  /  TBili  0.7  /  DBili  x   /  AST  28  /  ALT  17  /  AlkPhos  103  07-08    PT/INR - ( 2022 04:25 )   PT: 12.2 sec;   INR: 1.05 ratio         PTT - ( 2022 04:25 )  PTT:26.8 sec      Urinalysis Basic - ( 2022 01:30 )    Color: Light Yellow / Appearance: Clear / S.011 / pH: x  Gluc: x / Ketone: Negative  / Bili: Negative / Urobili: <2 mg/dL   Blood: x / Protein: Negative / Nitrite: Negative   Leuk Esterase: Negative / RBC: x / WBC x   Sq Epi: x / Non Sq Epi: x / Bacteria: x        Culture - Blood (collected 2022 20:59)  Source: .Blood Blood-Peripheral  Preliminary Report (2022 03:02):    No growth to date.    Culture - Blood (collected 2022 20:59)  Source: .Blood Blood-Peripheral  Preliminary Report (2022 03:02):    No growth to date.       PROGRESS NOTE:   Authored by Dr. Zechariah Rausch  Patient is a 51y old  Male who presents with a chief complaint of ETOH Withdrawal (2022 07:10)      SUBJECTIVE / OVERNIGHT EVENTS:  ON, he is complaining of     ADDITIONAL REVIEW OF SYSTEMS:    MEDICATIONS  (STANDING):  folic acid 1 milliGRAM(s) Oral daily  multivitamin 1 Tablet(s) Oral daily  nicotine -   7 mG/24Hr(s) Patch 1 Patch Transdermal daily  pantoprazole  Injectable 40 milliGRAM(s) IV Push every 12 hours  piperacillin/tazobactam IVPB.. 3.375 Gram(s) IV Intermittent every 8 hours  potassium chloride   Powder 40 milliEquivalent(s) Oral once  thiamine IVPB 500 milliGRAM(s) IV Intermittent every 8 hours    MEDICATIONS  (PRN):  acetaminophen     Tablet .. 650 milliGRAM(s) Oral every 6 hours PRN Temp greater or equal to 38C (100.4F)      CAPILLARY BLOOD GLUCOSE        I&O's Summary    2022 07:01  -  2022 07:00  --------------------------------------------------------  IN: 3610 mL / OUT: 1875 mL / NET: 1735 mL        PHYSICAL EXAM:  Vital Signs Last 24 Hrs  T(C): 37.3 (2022 05:58), Max: 38.1 (2022 12:00)  T(F): 99.2 (2022 05:58), Max: 100.5 (2022 12:00)  HR: 92 (2022 05:58) (91 - 123)  BP: 101/62 (2022 05:58) (76/57 - 105/76)  BP(mean): 87 (2022 22:00) (65 - 87)  RR: 17 (2022 05:58) (13 - 23)  SpO2: 100% (2022 05:58) (98% - 100%)    Parameters below as of 2022 05:58  Patient On (Oxygen Delivery Method): room air        GENERAL: NAD, lying comfortably in bed  HEAD: Atraumatic, normocephalic  EYES: EOMI b/l PERRLA b/l, conjunctiva and sclera clear  NECK: Supple, No JVD, No LAD  RESPIRATORY: Normal respiratory effort; lungs are clear to auscultation bilaterally  CARDIOVASCULAR: Regular rate and rhythm, normal S1 and S2, no murmur/rub/gallop; No lower extremity edema  ABDOMEN: Nontender, normoactive bowel sounds, no rebound/guarding; No hepatosplenomegaly  MUSCULOSKELETAL: no clubbing or cyanosis of digits; no joint swelling or tenderness to palpation  NEURO: Non focal   PSYCH: A+O to person, place, and time; affect appropriate    LABS:                        7.1    5.27  )-----------( 118      ( 2022 06:00 )             21.9     07-08    137  |  100  |  6<L>  ----------------------------<  120<H>  3.2<L>   |  27  |  0.60    Ca    8.2<L>      2022 06:00  Phos  2.8     07-08  Mg     1.80     07-08    TPro  5.8<L>  /  Alb  2.8<L>  /  TBili  0.7  /  DBili  x   /  AST  28  /  ALT  17  /  AlkPhos  103  07-08    PT/INR - ( 2022 04:25 )   PT: 12.2 sec;   INR: 1.05 ratio         PTT - ( 2022 04:25 )  PTT:26.8 sec      Urinalysis Basic - ( 2022 01:30 )    Color: Light Yellow / Appearance: Clear / S.011 / pH: x  Gluc: x / Ketone: Negative  / Bili: Negative / Urobili: <2 mg/dL   Blood: x / Protein: Negative / Nitrite: Negative   Leuk Esterase: Negative / RBC: x / WBC x   Sq Epi: x / Non Sq Epi: x / Bacteria: x        Culture - Blood (collected 2022 20:59)  Source: .Blood Blood-Peripheral  Preliminary Report (2022 03:02):    No growth to date.    Culture - Blood (collected 2022 20:59)  Source: .Blood Blood-Peripheral  Preliminary Report (2022 03:02):    No growth to date.       PROGRESS NOTE:   Authored by Dr. Zechariah Rausch  Patient is a 51y old  Male who presents with a chief complaint of ETOH Withdrawal (2022 07:10)      SUBJECTIVE / OVERNIGHT EVENTS:  ON, he is complaining of hunger, and risk of DVT. he denied tremor, cp, sob, hallucinations. He finished TB inpatient for 7 weeks and 11 months of medication, primary TB.     ADDITIONAL REVIEW OF SYSTEMS:  Review of Systems:  Constitutional: No fever, No weight loss, good appetite/po intake  Head: No headache   Eyes: No blurry vision, No diplopia  Neuro: No tremors, No muscle weakness   Cardiovascular: No chest pain, No palpitations  Respiratory: No SOB, No cough  GI: No nausea, No vomiting, No diarrhea  : No dysuria, No hematuria  Skin: No rash  MSK: No joint pain   Psych: No depression  Heme: No abnormal bruising, no abnormal bleeding      MEDICATIONS  (STANDING):  folic acid 1 milliGRAM(s) Oral daily  multivitamin 1 Tablet(s) Oral daily  nicotine -   7 mG/24Hr(s) Patch 1 Patch Transdermal daily  pantoprazole  Injectable 40 milliGRAM(s) IV Push every 12 hours  piperacillin/tazobactam IVPB.. 3.375 Gram(s) IV Intermittent every 8 hours  potassium chloride   Powder 40 milliEquivalent(s) Oral once  thiamine IVPB 500 milliGRAM(s) IV Intermittent every 8 hours    MEDICATIONS  (PRN):  acetaminophen     Tablet .. 650 milliGRAM(s) Oral every 6 hours PRN Temp greater or equal to 38C (100.4F)      CAPILLARY BLOOD GLUCOSE        I&O's Summary    2022 07:01  -  2022 07:00  --------------------------------------------------------  IN: 3610 mL / OUT: 1875 mL / NET: 1735 mL        PHYSICAL EXAM:  Vital Signs Last 24 Hrs  T(C): 37.3 (2022 05:58), Max: 38.1 (2022 12:00)  T(F): 99.2 (2022 05:58), Max: 100.5 (2022 12:00)  HR: 92 (2022 05:58) (91 - 123)  BP: 101/62 (2022 05:58) (76/57 - 105/76)  BP(mean): 87 (2022 22:00) (65 - 87)  RR: 17 (2022 05:58) (13 - 23)  SpO2: 100% (2022 05:58) (98% - 100%)    Parameters below as of 2022 05:58  Patient On (Oxygen Delivery Method): room air    GENERAL: NAD, lying comfortably in bed, appears emotional when questioned.   HEAD: Atraumatic, normocephalic  EYES: EOMI b/l PERRLA b/l, conjunctiva and sclera clear, non-icteric  NECK: Supple, No JVD, No LAD  RESPIRATORY: Normal respiratory effort; lungs are clear to auscultation bilaterally  CARDIOVASCULAR: Regular rate and rhythm, normal S1 and S2, no murmur/rub/gallop; No lower extremity edema  ABDOMEN: Distended, soft, Nontender, normoactive bowel sounds, no rebound/guarding; No hepatosplenomegaly  MUSCULOSKELETAL: no clubbing or cyanosis of digits; no joint swelling or tenderness to palpation  NEURO: Non focal, slight tremors, no asterixis   PSYCH: A+O to person, place, and time; affect appropriate    LABS:                        7.1    5.27  )-----------( 118      ( 2022 06:00 )             21.9     07-08    137  |  100  |  6<L>  ----------------------------<  120<H>  3.2<L>   |  27  |  0.60    Ca    8.2<L>      2022 06:00  Phos  2.8     07-08  Mg     1.80     07-08    TPro  5.8<L>  /  Alb  2.8<L>  /  TBili  0.7  /  DBili  x   /  AST  28  /  ALT  17  /  AlkPhos  103  07-08    PT/INR - ( 2022 04:25 )   PT: 12.2 sec;   INR: 1.05 ratio         PTT - ( 2022 04:25 )  PTT:26.8 sec      Urinalysis Basic - ( 2022 01:30 )    Color: Light Yellow / Appearance: Clear / S.011 / pH: x  Gluc: x / Ketone: Negative  / Bili: Negative / Urobili: <2 mg/dL   Blood: x / Protein: Negative / Nitrite: Negative   Leuk Esterase: Negative / RBC: x / WBC x   Sq Epi: x / Non Sq Epi: x / Bacteria: x      bx Culture Results:   No growth to date. (22 @ 20:59)      Culture - Blood (collected 2022 20:59)  Source: .Blood Blood-Peripheral  Preliminary Report (2022 03:02):    No growth to date.    Culture - Blood (collected 2022 20:59)  Source: .Blood Blood-Peripheral  Preliminary Report (2022 03:02):    No growth to date.

## 2022-07-08 NOTE — PROGRESS NOTE ADULT - PROBLEM SELECTOR PLAN 4
#Hypokalemia  - K 3.2 on admission, 2.8 on arrival to ICU  - Repleted 60 mg total IV  - Replete as needed  - Mg 0.9 on arrival. Repleted 4 g #Hypokalemia  - K 3.2 on admission, 2.8 on arrival to ICU  - Repleted 60 mg total IV, this am 3.2,   - Replete as needed,   - Mg 0.9 on arrival. Repleted 4 g    #hypocalcemia   -this am 8.2, last night 8.1  -albumin 2.8 #Hypokalemia  - K 3.2 on admission, 2.8 on arrival to ICU  - Repleted 60 mg total IV, this am 07/08 3.2,   - Replete as needed,   - Mg 0.9 on arrival. Replenish 4 g    #hypocalcemia   -this am 8.2,   -albumin 2.8 corrected to 9.2

## 2022-07-08 NOTE — DIETITIAN INITIAL EVALUATION ADULT - PERTINENT MEDS FT
MEDICATIONS  (STANDING):  folic acid 1 milliGRAM(s) Oral daily  multivitamin 1 Tablet(s) Oral daily  nicotine -   7 mG/24Hr(s) Patch 1 Patch Transdermal daily  pantoprazole  Injectable 40 milliGRAM(s) IV Push every 12 hours  piperacillin/tazobactam IVPB.. 3.375 Gram(s) IV Intermittent every 8 hours  thiamine IVPB 500 milliGRAM(s) IV Intermittent every 8 hours    MEDICATIONS  (PRN):  acetaminophen     Tablet .. 650 milliGRAM(s) Oral every 6 hours PRN Temp greater or equal to 38C (100.4F)  LORazepam     Tablet 2 milliGRAM(s) Oral every 2 hours PRN Symptom-triggered 2 point increase in CIWA-Ar  LORazepam     Tablet 2 milliGRAM(s) Oral every 1 hour PRN Symptom-triggered: each CIWA -Ar score 8 or GREATER

## 2022-07-08 NOTE — DIETITIAN INITIAL EVALUATION ADULT - ORAL INTAKE PTA/DIET HISTORY
Pt lives at home alone. His sister brings over prepared meals and groceries for him. No difficulty obtaining groceries. No specific diet followed at home, mentioned follows "food restrictions" around his [employer] however did not elaborate when asked. No supplements/Vitamins taken PTA.

## 2022-07-08 NOTE — DIETITIAN INITIAL EVALUATION ADULT - PERTINENT LABORATORY DATA
07-08 Na 137 mmol/L Glu 120 mg/dL<H> K+ 3.2 mmol/L<L> Cr 0.60 mg/dL BUN 6 mg/dL<L> Phos 2.8 mg/dL    A1C with Estimated Average Glucose Result: 4.6 % (07-06-22 @ 03:27)

## 2022-07-08 NOTE — DIETITIAN INITIAL EVALUATION ADULT - OTHER INFO
Per chart, Mr. Syeda Johnson is a 50 yo M w/ PMH of TB and alcohol use, admitted from the ED 7/6 for AMS, he started drinking when he was 21, going through 1.5 bottles of vodka every week, has poor appetite, denied seizures, or prior admissions for alcohol related problem.     Nutrition interview: No recent episodes of nausea, vomiting, diarrhea or constipation, BM noted today per pt. Denies any chewing/swallowing difficulties. No food allergies. Stated UBW: ~135#, unable to specify how long ago, but states it is because he is not eating well. Current wt (7/7): 108.4#. Food preferences explored and noted. Intake is 25% or less per pt and as observed today. Feeding skills: set up tray. Pt amendable to drinking Vital Health Shake 2x daily (1040 guicho and 44 gm protein) to promote optimal PO intake and promote wt gain-  dept to provide.

## 2022-07-09 LAB
ANION GAP SERPL CALC-SCNC: 12 MMOL/L — SIGNIFICANT CHANGE UP (ref 7–14)
BUN SERPL-MCNC: 7 MG/DL — SIGNIFICANT CHANGE UP (ref 7–23)
CALCIUM SERPL-MCNC: 8.5 MG/DL — SIGNIFICANT CHANGE UP (ref 8.4–10.5)
CHLORIDE SERPL-SCNC: 100 MMOL/L — SIGNIFICANT CHANGE UP (ref 98–107)
CO2 SERPL-SCNC: 24 MMOL/L — SIGNIFICANT CHANGE UP (ref 22–31)
CREAT SERPL-MCNC: 0.68 MG/DL — SIGNIFICANT CHANGE UP (ref 0.5–1.3)
EGFR: 114 ML/MIN/1.73M2 — SIGNIFICANT CHANGE UP
FERRITIN SERPL-MCNC: 826 NG/ML — HIGH (ref 30–400)
GLUCOSE SERPL-MCNC: 112 MG/DL — HIGH (ref 70–99)
HCT VFR BLD CALC: 23.5 % — LOW (ref 39–50)
HGB BLD-MCNC: 7.6 G/DL — LOW (ref 13–17)
IRON SATN MFR SERPL: 22 % — SIGNIFICANT CHANGE UP (ref 14–50)
IRON SATN MFR SERPL: 37 UG/DL — LOW (ref 45–165)
MAGNESIUM SERPL-MCNC: 1.3 MG/DL — LOW (ref 1.6–2.6)
MCHC RBC-ENTMCNC: 32.3 GM/DL — SIGNIFICANT CHANGE UP (ref 32–36)
MCHC RBC-ENTMCNC: 33.5 PG — SIGNIFICANT CHANGE UP (ref 27–34)
MCV RBC AUTO: 103.5 FL — HIGH (ref 80–100)
NRBC # BLD: 0 /100 WBCS — SIGNIFICANT CHANGE UP
NRBC # FLD: 0 K/UL — SIGNIFICANT CHANGE UP
PHOSPHATE SERPL-MCNC: 2.3 MG/DL — LOW (ref 2.5–4.5)
PLATELET # BLD AUTO: 165 K/UL — SIGNIFICANT CHANGE UP (ref 150–400)
POTASSIUM SERPL-MCNC: 3.6 MMOL/L — SIGNIFICANT CHANGE UP (ref 3.5–5.3)
POTASSIUM SERPL-SCNC: 3.6 MMOL/L — SIGNIFICANT CHANGE UP (ref 3.5–5.3)
RBC # BLD: 2.27 M/UL — LOW (ref 4.2–5.8)
RBC # FLD: 13.7 % — SIGNIFICANT CHANGE UP (ref 10.3–14.5)
SODIUM SERPL-SCNC: 136 MMOL/L — SIGNIFICANT CHANGE UP (ref 135–145)
TIBC SERPL-MCNC: 170 UG/DL — LOW (ref 220–430)
UIBC SERPL-MCNC: 133 UG/DL — SIGNIFICANT CHANGE UP (ref 110–370)
VIT B12 SERPL-MCNC: 553 PG/ML — SIGNIFICANT CHANGE UP (ref 200–900)
WBC # BLD: 5.64 K/UL — SIGNIFICANT CHANGE UP (ref 3.8–10.5)
WBC # FLD AUTO: 5.64 K/UL — SIGNIFICANT CHANGE UP (ref 3.8–10.5)

## 2022-07-09 PROCEDURE — 99233 SBSQ HOSP IP/OBS HIGH 50: CPT | Mod: GC

## 2022-07-09 RX ORDER — SODIUM,POTASSIUM PHOSPHATES 278-250MG
1 POWDER IN PACKET (EA) ORAL ONCE
Refills: 0 | Status: COMPLETED | OUTPATIENT
Start: 2022-07-09 | End: 2022-07-09

## 2022-07-09 RX ORDER — MAGNESIUM SULFATE 500 MG/ML
2 VIAL (ML) INJECTION ONCE
Refills: 0 | Status: COMPLETED | OUTPATIENT
Start: 2022-07-09 | End: 2022-07-09

## 2022-07-09 RX ORDER — THIAMINE MONONITRATE (VIT B1) 100 MG
100 TABLET ORAL DAILY
Refills: 0 | Status: DISCONTINUED | OUTPATIENT
Start: 2022-07-09 | End: 2022-07-20

## 2022-07-09 RX ADMIN — Medication 1 PATCH: at 12:51

## 2022-07-09 RX ADMIN — Medication 1 TABLET(S): at 12:51

## 2022-07-09 RX ADMIN — Medication 1 PATCH: at 12:11

## 2022-07-09 RX ADMIN — PANTOPRAZOLE SODIUM 40 MILLIGRAM(S): 20 TABLET, DELAYED RELEASE ORAL at 17:52

## 2022-07-09 RX ADMIN — Medication 100 MILLIGRAM(S): at 13:01

## 2022-07-09 RX ADMIN — Medication 25 GRAM(S): at 09:59

## 2022-07-09 RX ADMIN — PIPERACILLIN AND TAZOBACTAM 25 GRAM(S): 4; .5 INJECTION, POWDER, LYOPHILIZED, FOR SOLUTION INTRAVENOUS at 17:52

## 2022-07-09 RX ADMIN — PANTOPRAZOLE SODIUM 40 MILLIGRAM(S): 20 TABLET, DELAYED RELEASE ORAL at 06:50

## 2022-07-09 RX ADMIN — Medication 1 MILLIGRAM(S): at 12:51

## 2022-07-09 RX ADMIN — Medication 1 PACKET(S): at 09:59

## 2022-07-09 RX ADMIN — PIPERACILLIN AND TAZOBACTAM 25 GRAM(S): 4; .5 INJECTION, POWDER, LYOPHILIZED, FOR SOLUTION INTRAVENOUS at 02:44

## 2022-07-09 RX ADMIN — PIPERACILLIN AND TAZOBACTAM 25 GRAM(S): 4; .5 INJECTION, POWDER, LYOPHILIZED, FOR SOLUTION INTRAVENOUS at 09:58

## 2022-07-09 RX ADMIN — Medication 105 MILLIGRAM(S): at 06:45

## 2022-07-09 NOTE — PROGRESS NOTE ADULT - PROBLEM SELECTOR PLAN 3
Patient presented w/ hypokalemia and hypomagnesemia on admission  - now s/p multiple doses of K and Mg  - monitor BMP, replete as needed   - Hypocalcemia: albumin 2.8 corrected to 9.2

## 2022-07-09 NOTE — PHYSICAL THERAPY INITIAL EVALUATION ADULT - PERTINENT HX OF CURRENT PROBLEM, REHAB EVAL
Pt is a 51 year old male presenting with AMS, confusion, visual hallucinations, and anxiety. UTox negative for alcohol, pt family reports last drink was 5 days ago, intoxication unlikely. Pt admitted for alcohol dependence with withdrawal

## 2022-07-09 NOTE — PROGRESS NOTE ADULT - SUBJECTIVE AND OBJECTIVE BOX
PROGRESS NOTE:   Authored by Dr. Lauren Montague    Patient is a 51y old  Male who presents with a chief complaint of Alcohol dependence with withdrawal     (08 Jul 2022 14:20)      SUBJECTIVE / OVERNIGHT EVENTS: No overnight events. Patient reported feeling well. Denied chest pain, SOB, abdominal pain, n/v/d. Denied hallucinations or seen people or animals in his room. Denied feeling agitated or confused. Continues to report feeling motivated to quit alcohol and smoking.     ADDITIONAL REVIEW OF SYSTEMS:  Review of Systems:  Constitutional: No fever, No weight loss, good appetite/po intake  Head: No headache   Eyes: No blurry vision, No diplopia  Neuro: No tremors, No muscle weakness   Cardiovascular: No chest pain, No palpitations  Respiratory: No SOB, No cough  GI: No nausea, No vomiting, No diarrhea  : No dysuria, No hematuria  Skin: No rash  MSK: No joint pain   Psych: No depression  Heme: No abnormal bruising, no abnormal bleeding    MEDICATIONS  (STANDING):  folic acid 1 milliGRAM(s) Oral daily  multivitamin 1 Tablet(s) Oral daily  nicotine -   7 mG/24Hr(s) Patch 1 Patch Transdermal daily  pantoprazole  Injectable 40 milliGRAM(s) IV Push every 12 hours  piperacillin/tazobactam IVPB.. 3.375 Gram(s) IV Intermittent every 8 hours    MEDICATIONS  (PRN):  acetaminophen     Tablet .. 650 milliGRAM(s) Oral every 6 hours PRN Temp greater or equal to 38C (100.4F)  LORazepam     Tablet 2 milliGRAM(s) Oral every 2 hours PRN Symptom-triggered 2 point increase in CIWA-Ar  LORazepam     Tablet 2 milliGRAM(s) Oral every 1 hour PRN Symptom-triggered: each CIWA -Ar score 8 or GREATER      CAPILLARY BLOOD GLUCOSE        I&O's Summary      PHYSICAL EXAM:  Vital Signs Last 24 Hrs  T(C): 37.2 (09 Jul 2022 06:00), Max: 37.2 (08 Jul 2022 16:00)  T(F): 99 (09 Jul 2022 06:00), Max: 99 (08 Jul 2022 16:00)  HR: 86 (09 Jul 2022 06:00) (69 - 100)  BP: 108/62 (09 Jul 2022 06:00) (96/63 - 109/68)  BP(mean): --  RR: 16 (09 Jul 2022 06:00) (16 - 18)  SpO2: 100% (09 Jul 2022 06:00) (100% - 100%)    Parameters below as of 09 Jul 2022 06:00  Patient On (Oxygen Delivery Method): room air        GENERAL: NAD, lying comfortably in bed  HEAD: Atraumatic, normocephalic  EYES: EOMI b/l PERRLA b/l, conjunctiva and sclera clear  NECK: Supple, No JVD, No LAD  RESPIRATORY: Normal respiratory effort; lungs are clear to auscultation bilaterally  CARDIOVASCULAR: Regular rate and rhythm, normal S1 and S2, no murmur/rub/gallop; No lower extremity edema  ABDOMEN: Nontender, normoactive bowel sounds, no rebound/guarding; No hepatosplenomegaly  MUSCULOSKELETAL: no clubbing or cyanosis of digits; no joint swelling or tenderness to palpation  NEURO: Non focal   PSYCH: A+O to person, place, and time; affect appropriate    LABS:                          7.6    5.64  )-----------( 165      ( 09 Jul 2022 06:00 )             23.5     07-09    136  |  100  |  7   ----------------------------<  112<H>  3.6   |  24  |  0.68    Ca    8.5      09 Jul 2022 06:00  Phos  2.3     07-09  Mg     1.30     07-09    TPro  5.8<L>  /  Alb  2.8<L>  /  TBili  0.7  /  DBili  x   /  AST  28  /  ALT  17  /  AlkPhos  103  07-08              Culture - Blood (collected 06 Jul 2022 20:59)  Source: .Blood Blood-Peripheral  Preliminary Report (08 Jul 2022 03:02):    No growth to date.    Culture - Blood (collected 06 Jul 2022 20:59)  Source: .Blood Blood-Peripheral  Preliminary Report (08 Jul 2022 03:02):    No growth to date.

## 2022-07-09 NOTE — PROGRESS NOTE ADULT - PROBLEM SELECTOR PLAN 4
DVT ppx: Hold for now in setting of anemia  Decubiti: None  Code status: Full code  Dispo: pending course DVT ppx: Hold for now in setting of anemia  Decubiti: None  Code status: Full code  Dispo: PT recommends DC home, will ask if he needs outpt pt, ask if can go today, if so dc pt now. DVT ppx: Hold for now in setting of anemia  Decubiti: None  Code status: Full code  Dispo: pending

## 2022-07-09 NOTE — PROGRESS NOTE ADULT - PROBLEM SELECTOR PLAN 1
Patient presented w/ AMS and reported visual hallucinations as per sister concerning for alcohol-induced psychosis vs withdrawal vs DT  ---- Low fever to 100.2, WBC 4.98, UA negative, 7/6 BCx NGT, he was started on Zosyn empirically (will complete 5 days total)  - Does report fall recently (~2 weeks ago), however CTH negative in ED on 7/6  - Loaded w/ 600 mg phenobarb IV & started on precedex on transfer to MICU  ---- Precedex discontinued, no further agitation/restlessness and not requiring phenobarb.  - Thiamine 500mg IVPB x 3 days, now Thiamine 100mg PO QD + Folate  - mild transaminitis; now resolved  - On sxs triggered CIWA  - SBIRT consulted; pt originally uncooperative likely during acute setting; now calm and motivated to received information regarding alcohol programs  - PT consult for unsteady gait Patient presented w/ AMS and reported visual hallucinations as per sister concerning for alcohol-induced psychosis vs withdrawal vs DT  ---- Low fever to 100.2, WBC 4.98, UA negative, 7/6 BCx NGT, he was started on Zosyn empirically (will complete 5 days total)  - Does report fall recently (~2 weeks ago), however CTH negative in ED on 7/6  - Loaded w/ 600 mg phenobarb IV & started on precedex on transfer to MICU  ---- Precedex discontinued, no further agitation/restlessness and not requiring phenobarb.  - Thiamine 500mg IVPB x 3 days, now Thiamine 100mg PO QD + Folate  - mild transaminitis; now resolved  - On sxs triggered CIWA  - SBIRT consulted; pt uncooperative refer to SW note  - PT consult for unsteady gait

## 2022-07-09 NOTE — PROGRESS NOTE ADULT - PROBLEM SELECTOR PLAN 2
Pt was found to have Hb 7.1 from previous Hb 2 weeks ago was 12.3  - initially no reports of bleeding, pt denies any bloody vomiting or stools. However pt family member reported seeing blood in toilet & sink 2 days prior to admission  - started on IV PPI BID 7/6  - cbc q12, transfuse Hb <7  - GI consulted, Anemia, macrocytic. Hgb stable. Patient denies hematochezia, melena. Given concomitant thrombocytopenia, likely in setting of chronic alcohol use  - no plan for GI intervention at this time  - H/H monitoring  - no melena/hematochezia or other signs of active bleed during admission

## 2022-07-09 NOTE — PHYSICAL THERAPY INITIAL EVALUATION ADULT - ADDITIONAL COMMENTS
Pt lives in a private home alone; there are 2 steps to enter. Pt reports he was independent with mobility, self-care, and ADLs; no assistive devices utilized. Required assistance with iADLs such as cleaning, cooking, and laundry. Sister lives nearby and provides assistance. Owns rolling walker.

## 2022-07-09 NOTE — PHYSICAL THERAPY INITIAL EVALUATION ADULT - MANUAL MUSCLE TESTING RESULTS, REHAB EVAL
Bilateral LE grossly 5/5 with tremors present likely secondary to alcohol withdrawal/no strength deficits were identified

## 2022-07-09 NOTE — PHYSICAL THERAPY INITIAL EVALUATION ADULT - PATIENT PROFILE REVIEW, REHAB EVAL
PT initial evaluation received and chart review completed. Pt agreeable to participate in PT evaluation. Pt cleared by ANDREW Howard./yes

## 2022-07-09 NOTE — PHYSICAL THERAPY INITIAL EVALUATION ADULT - DISCHARGE DISPOSITION, PT EVAL
Recommend discharge home as patient appears at functional baseline per reports. May benefit from outpatient physical therapy to address reports of strength and balance deficits.

## 2022-07-10 ENCOUNTER — TRANSCRIPTION ENCOUNTER (OUTPATIENT)
Age: 52
End: 2022-07-10

## 2022-07-10 DIAGNOSIS — R44.1 VISUAL HALLUCINATIONS: ICD-10-CM

## 2022-07-10 LAB
ALBUMIN SERPL ELPH-MCNC: 3.3 G/DL — SIGNIFICANT CHANGE UP (ref 3.3–5)
ALP SERPL-CCNC: 108 U/L — SIGNIFICANT CHANGE UP (ref 40–120)
ALT FLD-CCNC: 11 U/L — SIGNIFICANT CHANGE UP (ref 4–41)
ANION GAP SERPL CALC-SCNC: 12 MMOL/L — SIGNIFICANT CHANGE UP (ref 7–14)
APPEARANCE UR: CLEAR — SIGNIFICANT CHANGE UP
AST SERPL-CCNC: 22 U/L — SIGNIFICANT CHANGE UP (ref 4–40)
BASOPHILS # BLD AUTO: 0.03 K/UL — SIGNIFICANT CHANGE UP (ref 0–0.2)
BASOPHILS NFR BLD AUTO: 0.5 % — SIGNIFICANT CHANGE UP (ref 0–2)
BILIRUB SERPL-MCNC: 0.8 MG/DL — SIGNIFICANT CHANGE UP (ref 0.2–1.2)
BILIRUB UR-MCNC: NEGATIVE — SIGNIFICANT CHANGE UP
BUN SERPL-MCNC: 6 MG/DL — LOW (ref 7–23)
CALCIUM SERPL-MCNC: 8.7 MG/DL — SIGNIFICANT CHANGE UP (ref 8.4–10.5)
CHLORIDE SERPL-SCNC: 99 MMOL/L — SIGNIFICANT CHANGE UP (ref 98–107)
CO2 SERPL-SCNC: 23 MMOL/L — SIGNIFICANT CHANGE UP (ref 22–31)
COLOR SPEC: YELLOW — SIGNIFICANT CHANGE UP
CREAT SERPL-MCNC: 0.66 MG/DL — SIGNIFICANT CHANGE UP (ref 0.5–1.3)
DIFF PNL FLD: NEGATIVE — SIGNIFICANT CHANGE UP
EGFR: 115 ML/MIN/1.73M2 — SIGNIFICANT CHANGE UP
EOSINOPHIL # BLD AUTO: 0.14 K/UL — SIGNIFICANT CHANGE UP (ref 0–0.5)
EOSINOPHIL NFR BLD AUTO: 2.4 % — SIGNIFICANT CHANGE UP (ref 0–6)
GLUCOSE SERPL-MCNC: 102 MG/DL — HIGH (ref 70–99)
GLUCOSE UR QL: NEGATIVE — SIGNIFICANT CHANGE UP
HCT VFR BLD CALC: 25.1 % — LOW (ref 39–50)
HGB BLD-MCNC: 7.6 G/DL — LOW (ref 13–17)
IANC: 2.43 K/UL — SIGNIFICANT CHANGE UP (ref 1.8–7.4)
IMM GRANULOCYTES NFR BLD AUTO: 0.5 % — SIGNIFICANT CHANGE UP (ref 0–1.5)
KETONES UR-MCNC: NEGATIVE — SIGNIFICANT CHANGE UP
LEUKOCYTE ESTERASE UR-ACNC: NEGATIVE — SIGNIFICANT CHANGE UP
LYMPHOCYTES # BLD AUTO: 1.18 K/UL — SIGNIFICANT CHANGE UP (ref 1–3.3)
LYMPHOCYTES # BLD AUTO: 20.3 % — SIGNIFICANT CHANGE UP (ref 13–44)
MAGNESIUM SERPL-MCNC: 1.6 MG/DL — SIGNIFICANT CHANGE UP (ref 1.6–2.6)
MCHC RBC-ENTMCNC: 30.3 GM/DL — LOW (ref 32–36)
MCHC RBC-ENTMCNC: 32.2 PG — SIGNIFICANT CHANGE UP (ref 27–34)
MCV RBC AUTO: 106.4 FL — HIGH (ref 80–100)
MONOCYTES # BLD AUTO: 2 K/UL — HIGH (ref 0–0.9)
MONOCYTES NFR BLD AUTO: 34.4 % — HIGH (ref 2–14)
NEUTROPHILS # BLD AUTO: 2.43 K/UL — SIGNIFICANT CHANGE UP (ref 1.8–7.4)
NEUTROPHILS NFR BLD AUTO: 41.9 % — LOW (ref 43–77)
NITRITE UR-MCNC: NEGATIVE — SIGNIFICANT CHANGE UP
NRBC # BLD: 0 /100 WBCS — SIGNIFICANT CHANGE UP
NRBC # FLD: 0 K/UL — SIGNIFICANT CHANGE UP
PH UR: 6 — SIGNIFICANT CHANGE UP (ref 5–8)
PHOSPHATE SERPL-MCNC: 3.3 MG/DL — SIGNIFICANT CHANGE UP (ref 2.5–4.5)
PLATELET # BLD AUTO: 254 K/UL — SIGNIFICANT CHANGE UP (ref 150–400)
POTASSIUM SERPL-MCNC: 3.5 MMOL/L — SIGNIFICANT CHANGE UP (ref 3.5–5.3)
POTASSIUM SERPL-SCNC: 3.5 MMOL/L — SIGNIFICANT CHANGE UP (ref 3.5–5.3)
PROT SERPL-MCNC: 6.6 G/DL — SIGNIFICANT CHANGE UP (ref 6–8.3)
PROT UR-MCNC: NEGATIVE — SIGNIFICANT CHANGE UP
RBC # BLD: 2.36 M/UL — LOW (ref 4.2–5.8)
RBC # FLD: 13.6 % — SIGNIFICANT CHANGE UP (ref 10.3–14.5)
SARS-COV-2 RNA SPEC QL NAA+PROBE: SIGNIFICANT CHANGE UP
SODIUM SERPL-SCNC: 134 MMOL/L — LOW (ref 135–145)
SP GR SPEC: 1.02 — SIGNIFICANT CHANGE UP (ref 1–1.05)
UROBILINOGEN FLD QL: SIGNIFICANT CHANGE UP
WBC # BLD: 5.81 K/UL — SIGNIFICANT CHANGE UP (ref 3.8–10.5)
WBC # FLD AUTO: 5.81 K/UL — SIGNIFICANT CHANGE UP (ref 3.8–10.5)

## 2022-07-10 PROCEDURE — 99232 SBSQ HOSP IP/OBS MODERATE 35: CPT | Mod: GC

## 2022-07-10 RX ORDER — THIAMINE MONONITRATE (VIT B1) 100 MG
1 TABLET ORAL
Qty: 0 | Refills: 0 | DISCHARGE
Start: 2022-07-10

## 2022-07-10 RX ORDER — SODIUM CHLORIDE 9 MG/ML
1000 INJECTION, SOLUTION INTRAVENOUS
Refills: 0 | Status: DISCONTINUED | OUTPATIENT
Start: 2022-07-10 | End: 2022-07-11

## 2022-07-10 RX ORDER — PANTOPRAZOLE SODIUM 20 MG/1
40 TABLET, DELAYED RELEASE ORAL
Qty: 0 | Refills: 0 | DISCHARGE
Start: 2022-07-10

## 2022-07-10 RX ORDER — FOLIC ACID 0.8 MG
1 TABLET ORAL
Qty: 0 | Refills: 0 | DISCHARGE
Start: 2022-07-10

## 2022-07-10 RX ADMIN — Medication 1 MILLIGRAM(S): at 12:23

## 2022-07-10 RX ADMIN — PIPERACILLIN AND TAZOBACTAM 25 GRAM(S): 4; .5 INJECTION, POWDER, LYOPHILIZED, FOR SOLUTION INTRAVENOUS at 09:46

## 2022-07-10 RX ADMIN — Medication 1 PATCH: at 12:11

## 2022-07-10 RX ADMIN — Medication 1 PATCH: at 19:31

## 2022-07-10 RX ADMIN — PANTOPRAZOLE SODIUM 40 MILLIGRAM(S): 20 TABLET, DELAYED RELEASE ORAL at 05:10

## 2022-07-10 RX ADMIN — SODIUM CHLORIDE 75 MILLILITER(S): 9 INJECTION, SOLUTION INTRAVENOUS at 12:29

## 2022-07-10 RX ADMIN — Medication 100 MILLIGRAM(S): at 12:23

## 2022-07-10 RX ADMIN — Medication 1 PATCH: at 07:19

## 2022-07-10 RX ADMIN — PIPERACILLIN AND TAZOBACTAM 25 GRAM(S): 4; .5 INJECTION, POWDER, LYOPHILIZED, FOR SOLUTION INTRAVENOUS at 19:30

## 2022-07-10 RX ADMIN — Medication 650 MILLIGRAM(S): at 05:15

## 2022-07-10 RX ADMIN — Medication 650 MILLIGRAM(S): at 08:00

## 2022-07-10 RX ADMIN — Medication 1 PATCH: at 12:23

## 2022-07-10 RX ADMIN — PANTOPRAZOLE SODIUM 40 MILLIGRAM(S): 20 TABLET, DELAYED RELEASE ORAL at 17:53

## 2022-07-10 RX ADMIN — PIPERACILLIN AND TAZOBACTAM 25 GRAM(S): 4; .5 INJECTION, POWDER, LYOPHILIZED, FOR SOLUTION INTRAVENOUS at 01:39

## 2022-07-10 RX ADMIN — Medication 1 TABLET(S): at 12:23

## 2022-07-10 NOTE — DISCHARGE NOTE PROVIDER - NSDCCPTREATMENT_GEN_ALL_CORE_FT
PRINCIPAL PROCEDURE  Procedure: CT chest w con  Findings and Treatment: CHEST:  LUNGS AND LARGE AIRWAYS: Patent central airways. Nonspecific   subcentimeter bilateral pulmonary nodules with largest in the right upper   lobe measuring 1.0 cm (3:166). Few regions of pulmonary scarring in the   left upper lobe. Central patches of groundglass opacities in the right   lung.  PLEURA: No pleural effusion.  VESSELS: Atherosclerotic changes of the aorta and coronary arteries.  HEART: Heart size is normal. No pericardial effusion.  MEDIASTINUM AND MANDI: Prevascular 3.0 x 1.5 cm centrally low attenuating   structure, likely represents necrotic lymph node, with smaller adjacent   similar density, likely of similar etiology. Calcified mediastinal lymph   node.  CHEST WALL AND LOWER NECK: Within normal limits.        SECONDARY PROCEDURE  Procedure: Abdomen CT  Findings and Treatment: ABDOMEN AND PELVIS:  LIVER: Nodularity along the posterior aspect of the right hepatic lobe.   Steatosis. Linear high attenuation along the right and middle hepatic   veins, may represent focal fibrosis. No focal hepatic lesions.  BILE DUCTS: Normal caliber.  GALLBLADDER: Contracted.  SPLEEN: Within normal limits.  PANCREAS: Within normal limits.  ADRENALS: Within normal limits.  KIDNEYS/URETERS: Within normal limits.  BLADDER: Diffuse mural thickening.  REPRODUCTIVE ORGANS: Prostate within normal limits.  BOWEL: Colonic diverticulosis without diverticulitis. No bowel   obstruction. Appendix is normal.  PERITONEUM: No ascites.  VESSELS: Perigastric and splenic varices. Atherosclerotic changes.  RETROPERITONEUM/LYMPH NODES: No lymphadenopathy.  ABDOMINAL WALL: Within normal limits.  BONES: Subacute left 12th rib displaced fracture. Old right rib fractures.

## 2022-07-10 NOTE — PROGRESS NOTE ADULT - PROBLEM SELECTOR PLAN 1
Patient presented w/ AMS and reported visual hallucinations as per sister concerning for alcohol-induced psychosis vs withdrawal vs DT  ---- Low fever to 100.2, WBC 4.98, UA negative, 7/6 BCx NGT, he was started on Zosyn empirically (will complete 5 days total)  - Does report fall recently (~2 weeks ago), however CTH negative in ED on 7/6  - Loaded w/ 600 mg phenobarb IV & started on precedex on transfer to MICU  ---- Precedex discontinued, no further agitation/restlessness and not requiring phenobarb.  - Thiamine 500mg IVPB x 3 days, now Thiamine 100mg PO QD + Folate  - mild transaminitis; now resolved  - On sxs triggered CIWA  - SBIRT consulted; pt uncooperative refer to SW note  - PT consult for unsteady gait Patient presented w/ AMS and reported visual hallucinations as per sister concerning for alcohol-induced psychosis vs withdrawal vs DT  ---- Low fever to 100.2, WBC 4.98, UA negative, 7/6 BCx NGT, he was started on Zosyn empirically (will complete 7 days total)  - Does report fall recently (~2 weeks ago), however CTH negative in ED on 7/6  - Loaded w/ 600 mg phenobarb IV & started on precedex on transfer to MICU  ---- Precedex discontinued, no further agitation/restlessness and not requiring phenobarb.  - Thiamine 500mg IVPB x 3 days, now Thiamine 100mg PO QD + Folate  - mild transaminitis; now resolved  - On sxs triggered CIWA, CIWA this am has been 0, 1.  - Due to low BP, he is on LR 75ml/hr for 12 hours    - SBIRT consulted; pt uncooperative refer to SW note  - PT consult for unsteady gait

## 2022-07-10 NOTE — DISCHARGE NOTE PROVIDER - NSDCCPCAREPLAN_GEN_ALL_CORE_FT
PRINCIPAL DISCHARGE DIAGNOSIS  Diagnosis: Alcohol withdrawal  Assessment and Plan of Treatment: You were admited for alcohol withdrawal symptoms of hallucinations,agitation, and tremor, you were initially managed by the medical intensive care unit who give you the medications to calm you down and reduce tremors, then transfered to the medical floor for symptoms  monitoring. Please follow up with physical therapy in an outpatient basis to regain strength and mobilty, and seek help in how to stay sober for long term.      SECONDARY DISCHARGE DIAGNOSES  Diagnosis: Fever  Assessment and Plan of Treatment: you had two events of low grade fevers, and was put on antibiotics and fluids, please return if you feel feverish, chill or night sweats.    Diagnosis: Hypotension  Assessment and Plan of Treatment: You blood pressure was on the low side, ample fluids were given. please hydrate and eat well, and watch out for signs of hypotenison such as dizziness, lightheadness, and seek help if symptoms persist.     PRINCIPAL DISCHARGE DIAGNOSIS  Diagnosis: Alcohol withdrawal  Assessment and Plan of Treatment: You were admited for alcohol withdrawal symptoms of hallucinations,agitation, and tremor, you were initially managed by the medical intensive care unit who give you the medications to calm you down and reduce tremors, then transfered to the medical floor for symptoms  monitoring. Please follow up with physical therapy in an outpatient basis to regain strength and mobilty, and seek help in how to stay sober for long term.      SECONDARY DISCHARGE DIAGNOSES  Diagnosis: Fever  Assessment and Plan of Treatment: you had several episodes of low grade fevers, and was put on antibiotics and fluids. We did a CT scan of your chest and abdomen to look for a source of infection, but it did not show any abnormal findings. The blood tests, chest xray, and urine test also revealed no evidence of infection. Please return if you feel feverish, chill or night sweats.    Diagnosis: Hypotension  Assessment and Plan of Treatment: You blood pressure was on the low side, ample fluids were given. please hydrate and eat well, and watch out for signs of low blood pressure such as dizziness, lightheadness, and seek help if symptoms persist.     PRINCIPAL DISCHARGE DIAGNOSIS  Diagnosis: Alcohol withdrawal  Assessment and Plan of Treatment: You were admited for alcohol withdrawal symptoms of hallucinations,agitation, and tremor, you were initially managed by the medical intensive care unit who give you the medications to calm you down and reduce tremors, then transfered to the medical floor for symptoms  monitoring. Please follow up with physical therapy in an outpatient basis to regain strength and mobilty, and seek help in how to stay sober for long term.  Your liver has damage called cirrhosis on CT abdomen. Stopping further alcohol intake will prevent further damage to liver. Please do NOT drink any more alcohol. Please join programs to help you recover.         SECONDARY DISCHARGE DIAGNOSES  Diagnosis: Fever  Assessment and Plan of Treatment: you had several episodes of low grade fevers, and was put on antibiotics and fluids. We did a CT scan which showed necrotic lymph node. We did not biopsy it. We tested for tuberculosis, and three sputum samples were negative. You will need CT scan repeated in 2 months and follow up with infectious disease doctor in 2 months. Please follow up with primary care doctor within 1 week of discharge.    Diagnosis: Hypotension  Assessment and Plan of Treatment:

## 2022-07-10 NOTE — DISCHARGE NOTE PROVIDER - DETAILS OF MALNUTRITION DIAGNOSIS/DIAGNOSES
This patient has been assessed with a concern for Malnutrition and was treated during this hospitalization for the following Nutrition diagnosis/diagnoses:     -  07/08/2022: Moderate protein-calorie malnutrition   -  07/08/2022: Underweight (BMI < 19)

## 2022-07-10 NOTE — DISCHARGE NOTE PROVIDER - HOSPITAL COURSE
HPI:  Mr. Syeda Johnson is a 50 yo M w/ PMH alcohol use, admitted from the ED 7/6 for AMS. Collateral info from chart and pt's sister Elzbieta ([phone]).    Pt had 1 day of AMS. Confusion, visual hallucinations, anxiety. Pt called his sister claiming there was a person in his home, but no one was there. Pt also reported seeing mice which were not there. No reports of increased diaphoresis, fever, palpitations, chest pain, SOB.     Pt called 911 due to perceiving a person in his home, and was brought to the ED. Observed to be disoriented, restless, tremulous. There he was normotensive & afebrile w/ mild tachycardia to 101. UTox negative for alcohol. CT head normal. Received 2 mg ativan x2 in the ED, continued to be restless & confused. Found to have Hb 7.5 (repeat 7.8), no reported rectal bleeding or bloody vomiting. Last Hb 6/23 was 12.3.    Of note, pt was previously a  working night shifts. On 6/23, while at work, he suffered a fall and went to the ED, discharged home. After being discharged home, he received notice that he was being fired for being intoxicated at work. Per sister, he was motivated to stop drinking after losing his job and had his last drink 5 days ago.     Per sister, pt has a "drinking problem" and drinks beer & vodka (unknown # of drinks) to help him fall asleep during the day. She states that he has drank like this for ~1 year, prior to 1 year go he had lower alcohol intake.    No other known medical hx. He does not take any medications nor any substances other than alcohol & tobacco (smokes cigarettes, cigars). His sister states that he was hospitalized at [facility] ~2 years ago for "tuberculosis" but does not recall any details.   (06 Jul 2022 09:43)    Hospital course:  On admission, pt were found to be highly agitated, restless, tremulous in the ED even after 2x 2mg ativan. He was transferred to MICU, patient was loaded with phenobarbital and started on precedex gtt (for a few hours). Hgb 7.5 in ED, family member had noticed BRB in sink and toilet 2 days prior to arrival in hospital. Required phenobarbital PRN here in the MICU. Patient had negative BARBARA in the MICU. After 24 hours became less agitated and confused. He is not requiring CO, and is eating a diet.  Did have a low grade fevers - for which bcx were sent. Blood consent in chart.      HPI:  Mr. Syeda Johnson is a 50 yo M w/ PMH alcohol use, admitted from the ED 7/6 for AMS. Collateral info from chart and pt's sister Elzbieta ([phone]).    Pt had 1 day of AMS. Confusion, visual hallucinations, anxiety. Pt called his sister claiming there was a person in his home, but no one was there. Pt also reported seeing mice which were not there. No reports of increased diaphoresis, fever, palpitations, chest pain, SOB.     Pt called 911 due to perceiving a person in his home, and was brought to the ED. Observed to be disoriented, restless, tremulous. There he was normotensive & afebrile w/ mild tachycardia to 101. UTox negative for alcohol. CT head normal. Received 2 mg ativan x2 in the ED, continued to be restless & confused. Found to have Hb 7.5 (repeat 7.8), no reported rectal bleeding or bloody vomiting. Last Hb 6/23 was 12.3.    Of note, pt was previously a  working night shifts. On 6/23, while at work, he suffered a fall and went to the ED, discharged home. After being discharged home, he received notice that he was being fired for being intoxicated at work. Per sister, he was motivated to stop drinking after losing his job and had his last drink 5 days ago.     Per sister, pt has a "drinking problem" and drinks beer & vodka (unknown # of drinks) to help him fall asleep during the day. She states that he has drank like this for ~1 year, prior to 1 year go he had lower alcohol intake.    No other known medical hx. He does not take any medications nor any substances other than alcohol & tobacco (smokes cigarettes, cigars). His sister states that he was hospitalized at [facility] ~2 years ago for "tuberculosis" but does not recall any details.   (06 Jul 2022 09:43)    Hospital course:  On admission, pt were found to be highly agitated, restless, tremulous in the ED even after 2x 2mg ativan. He was transferred to MICU, patient was loaded with phenobarbital and started on precedex gtt (for a few hours). Hgb 7.5 in ED, family member had noticed BRB in sink and toilet 2 days prior to arrival in hospital. Required phenobarbital PRN here in the MICU. Patient had negative BARBARA in the MICU. After 24 hours became less agitated and confused. He is not requiring CO, and is eating a diet.  Did have a low grade fevers - for which bcx were sent. he was downgraded to medicine floor, where he was put on CIWA protocol, no need for any ativan while on CIWA. his tremor improved, and PT evaluated him and determined he can be discharged home.      HPI:  Mr. Syeda Johnson is a 50 yo M w/ PMH alcohol use, admitted from the ED 7/6 for AMS. Collateral info from chart and pt's sister Elzbieta ([phone]).    Pt had 1 day of AMS. Confusion, visual hallucinations, anxiety. Pt called his sister claiming there was a person in his home, but no one was there. Pt also reported seeing mice which were not there. No reports of increased diaphoresis, fever, palpitations, chest pain, SOB.     Pt called 911 due to perceiving a person in his home, and was brought to the ED. Observed to be disoriented, restless, tremulous. There he was normotensive & afebrile w/ mild tachycardia to 101. UTox negative for alcohol. CT head normal. Received 2 mg ativan x2 in the ED, continued to be restless & confused. Found to have Hb 7.5 (repeat 7.8), no reported rectal bleeding or bloody vomiting. Last Hb 6/23 was 12.3.    Of note, pt was previously a  working night shifts. On 6/23, while at work, he suffered a fall and went to the ED, discharged home. After being discharged home, he received notice that he was being fired for being intoxicated at work. Per sister, he was motivated to stop drinking after losing his job and had his last drink 5 days ago.     Per sister, pt has a "drinking problem" and drinks beer & vodka (unknown # of drinks) to help him fall asleep during the day. She states that he has drank like this for ~1 year, prior to 1 year go he had lower alcohol intake.    No other known medical hx. He does not take any medications nor any substances other than alcohol & tobacco (smokes cigarettes, cigars). His sister states that he was hospitalized at [facility] ~2 years ago for "tuberculosis" but does not recall any details.   (06 Jul 2022 09:43)    Hospital course:  On admission, pt were found to be highly agitated, restless, tremulous in the ED even after 2x 2mg ativan. He was transferred to MICU, patient was loaded with phenobarbital and started on precedex gtt (for a few hours). Hgb 7.5 in ED, family member had noticed BRB in sink and toilet 2 days prior to arrival in hospital. Required phenobarbital PRN here in the MICU. Patient had negative BARBARA in the MICU. After 24 hours became less agitated and confused. He is not requiring CO, and is eating a diet.  Did have a low grade fevers - for which bcx were sent. he was downgraded to medicine floor, where he was put on CIWA protocol, no need for any ativan while on CIWA. his tremor improved, and PT evaluated him and determined he can be discharged home, but discharged delayed due to fever of 100.2 F on 7/10/22. Patient received LR 75 cc and 5-7 day course of zosyn was started. Repeat labs including blood cultures, COVID PCR, respiratory viral panel were sent, with negative results. On 7/11, patient spiked a temperature of 100.7 again. Given h/o TB and recurrent fever on abx, CXR and Bcx repeated with pending results. Zosyn was discontinued on 7/11 to monitor patient off abx. 7/12 no fever since discontinuation of Zosyn. HPI:  Mr. Syeda Johnson is a 52 yo M w/ PMH alcohol use, admitted from the ED 7/6 for AMS. Collateral info from chart and pt's sister Elzbieta ([phone]).    Pt had 1 day of AMS. Confusion, visual hallucinations, anxiety. Pt called his sister claiming there was a person in his home, but no one was there. Pt also reported seeing mice which were not there. No reports of increased diaphoresis, fever, palpitations, chest pain, SOB.     Pt called 911 due to perceiving a person in his home, and was brought to the ED. Observed to be disoriented, restless, tremulous. There he was normotensive & afebrile w/ mild tachycardia to 101. UTox negative for alcohol. CT head normal. Received 2 mg ativan x2 in the ED, continued to be restless & confused. Found to have Hb 7.5 (repeat 7.8), no reported rectal bleeding or bloody vomiting. Last Hb 6/23 was 12.3.    Of note, pt was previously a  working night shifts. On 6/23, while at work, he suffered a fall and went to the ED, discharged home. After being discharged home, he received notice that he was being fired for being intoxicated at work. Per sister, he was motivated to stop drinking after losing his job and had his last drink 5 days ago.     Per sister, pt has a "drinking problem" and drinks beer & vodka (unknown # of drinks) to help him fall asleep during the day. She states that he has drank like this for ~1 year, prior to 1 year go he had lower alcohol intake.    No other known medical hx. He does not take any medications nor any substances other than alcohol & tobacco (smokes cigarettes, cigars). His sister states that he was hospitalized at [facility] ~2 years ago for "tuberculosis" but does not recall any details.   (06 Jul 2022 09:43)    Hospital course:  On admission, pt were found to be highly agitated, restless, tremulous in the ED even after 2x 2mg ativan. He was transferred to MICU, patient was loaded with phenobarbital and started on precedex gtt (for a few hours). Hgb 7.5 in ED, family member had noticed BRB in sink and toilet 2 days prior to arrival in hospital. Required phenobarbital PRN here in the MICU. Patient had negative BARBARA in the MICU. After 24 hours became less agitated and confused. He is not requiring CO, and is eating a diet.  Did have a low grade fevers - for which bcx were sent. he was downgraded to medicine floor, where he was put on CIWA protocol, no need for any ativan while on CIWA. his tremor improved, and PT evaluated him and determined he can be discharged home, but discharged delayed due to fever of 100.2 F on 7/10/22. Patient received LR 75 cc and 5-7 day course of zosyn was started. Repeat labs including blood cultures, COVID PCR, respiratory viral panel were sent, with negative results. On 7/11, patient spiked a temperature of 100.7 again. Given h/o TB and recurrent fever on abx, CXR and Bcx repeated with pending results. Zosyn was discontinued on 7/11 to monitor patient off abx. 7/12 no fever since discontinuation of Zosyn.  7/13 patient continues to be afebrile off antibiotics. CT chest and abd/pelvis performed given patient's history of TB and fever of unknown origin. HPI:  Mr. Syeda Johnson is a 50 yo M w/ PMH alcohol use, admitted from the ED 7/6 for AMS. Collateral info from chart and pt's sister Elzbieta ([phone]).    Pt had 1 day of AMS. Confusion, visual hallucinations, anxiety. Pt called his sister claiming there was a person in his home, but no one was there. Pt also reported seeing mice which were not there. No reports of increased diaphoresis, fever, palpitations, chest pain, SOB.     Pt called 911 due to perceiving a person in his home, and was brought to the ED. Observed to be disoriented, restless, tremulous. There he was normotensive & afebrile w/ mild tachycardia to 101. UTox negative for alcohol. CT head normal. Received 2 mg ativan x2 in the ED, continued to be restless & confused. Found to have Hb 7.5 (repeat 7.8), no reported rectal bleeding or bloody vomiting. Last Hb 6/23 was 12.3.    Of note, pt was previously a  working night shifts. On 6/23, while at work, he suffered a fall and went to the ED, discharged home. After being discharged home, he received notice that he was being fired for being intoxicated at work. Per sister, he was motivated to stop drinking after losing his job and had his last drink 5 days ago.     Per sister, pt has a "drinking problem" and drinks beer & vodka (unknown # of drinks) to help him fall asleep during the day. She states that he has drank like this for ~1 year, prior to 1 year go he had lower alcohol intake.    No other known medical hx. He does not take any medications nor any substances other than alcohol & tobacco (smokes cigarettes, cigars). His sister states that he was hospitalized at [facility] ~2 years ago for "tuberculosis" but does not recall any details.   (06 Jul 2022 09:43)    Hospital course:  On admission, pt were found to be highly agitated, restless, tremulous in the ED even after 2x 2mg ativan. He was transferred to MICU, patient was loaded with phenobarbital and started on precedex gtt (for a few hours). Hgb 7.5 in ED, family member had noticed BRB in sink and toilet 2 days prior to arrival in hospital. Required phenobarbital PRN here in the MICU. Patient had negative BARBARA in the MICU. After 24 hours became less agitated and confused. He is not requiring CO, and is eating a diet.  Did have a low grade fevers - for which bcx were sent. he was downgraded to medicine floor, where he was put on CIWA protocol, no need for any ativan while on CIWA. his tremor improved, and PT evaluated him and determined he can be discharged home, but discharged delayed due to fever of 100.2 F on 7/10/22. Patient received LR 75 cc and 5-7 day course of zosyn was started. Repeat labs including blood cultures, COVID PCR, respiratory viral panel were sent, with negative results. On 7/11, patient spiked a temperature of 100.7 again. Given h/o TB and recurrent fever on abx, CXR and Bcx repeated with pending results. Zosyn was discontinued on 7/11 to monitor patient off abx. 7/12 no fever since discontinuation of Zosyn.  7/13 patient continues to be afebrile off antibiotics. CT chest and abd/pelvis performed for workup of his fever, especially given his history of TB, which showed a new prevascular necrotic lymph node measuring 3.0 x 1.5cm compared to his previous chest CT from [facility] when he was undergoing treatment for TB. It was confirmed with [facility] that patient completed RIPE therapy and prolonged therapy with rifampin and isoniazid for cavitary lesion and non-improving clinical status. To workup for TB, 3 cultures of AFB were sent, all negative. Regular sputum culture also did not show abnormality. Due to potential for the necrotic lymph node being malignant vs. TB-related despite the negative AFB, thoracic surgery was consulted (based on prevascular location of the lymph node) for biopsy. HPI:  Mr. Syeda Johnson is a 52 yo M w/ PMH alcohol use, admitted from the ED 7/6 for AMS. Collateral info from chart and pt's sister Elzbieta ([phone]).    Hospital course:  On admission, pt were found to be highly agitated, restless, tremulous in the ED even after 2x 2mg ativan. He was transferred to MICU, patient was loaded with phenobarbital and started on precedex gtt (for a few hours). Hgb 7.5 in ED, family member had noticed BRB in sink and toilet 2 days prior to arrival in hospital. Required phenobarbital PRN here in the MICU. Patient had negative BARBARA in the MICU. After 24 hours became less agitated and confused. He is not requiring CO, and is eating a diet.  Did have a low grade fevers - for which bcx were sent. he was downgraded to medicine floor, where he was put on CIWA protocol, no need for any ativan while on CIWA. his tremor improved, and PT evaluated him and determined he can be discharged home, but discharged delayed due to fever of 100.2 F on 7/10/22. Patient received LR 75 cc and 5-7 day course of zosyn was started. Repeat labs including blood cultures, COVID PCR, respiratory viral panel were sent, with negative results. On 7/11, patient spiked a temperature of 100.7 again. Given h/o TB and recurrent fever on abx, CXR and Bcx repeated with pending results. Zosyn was discontinued on 7/11 to monitor patient off abx. 7/12 no fever since discontinuation of Zosyn.  7/13 patient continues to be afebrile off antibiotics. CT chest and abd/pelvis performed for workup of his fever, especially given his history of TB, which showed a new prevascular necrotic lymph node measuring 3.0 x 1.5cm compared to his previous chest CT from [facility] when he was undergoing treatment for TB. It was confirmed with [facility] that patient completed RIPE therapy and prolonged therapy with rifampin and isoniazid for cavitary lesion and non-improving clinical status. To workup for TB, 3 cultures of AFB were sent, all negative. Regular sputum culture also did not show abnormality. Due to potential for the necrotic lymph node being malignant vs. TB-related despite the negative AFB, thoracic surgery was consulted (based on prevascular location of the lymph node) for biopsy. Thoracic surgery and infectious disease decided to forgot biopsy at this time and repeat CT chest in 2 months and follow up with infectious disease outpatient.        HPI:  Mr. Syeda Johnson is a 52 yo M w/ PMH alcohol use, admitted from the ED 7/6 for AMS. Collateral info from chart and pt's sister Elzbieta ([phone]).    Hospital course:  On admission, pt were found to be highly agitated, restless, tremulous in the ED even after 2x 2mg ativan. He was transferred to MICU, patient was loaded with phenobarbital and started on precedex gtt (for a few hours). Hgb 7.5 in ED, family member had noticed BRB in sink and toilet 2 days prior to arrival in hospital. Required phenobarbital PRN here in the MICU. Patient had negative BARBARA in the MICU. After 24 hours became less agitated and confused. He is not requiring CO, and is eating a diet.  Did have a low grade fevers - for which bcx were sent. he was downgraded to medicine floor, where he was put on CIWA protocol, no need for any ativan while on CIWA. his tremor improved, and PT evaluated him and determined he can be discharged home, but discharged delayed due to fever of 100.2 F on 7/10/22. Patient received LR 75 cc and 5-7 day course of zosyn was started. Repeat labs including blood cultures, COVID PCR, respiratory viral panel were sent, with negative results. On 7/11, patient spiked a temperature of 100.7 again. Given h/o TB and recurrent fever on abx, CXR and Bcx repeated with pending results. Zosyn was discontinued on 7/11 to monitor patient off abx. 7/12 no fever since discontinuation of Zosyn.  7/13 patient continues to be afebrile off antibiotics. CT chest and abd/pelvis performed for workup of his fever, especially given his history of TB, which showed a new prevascular necrotic lymph node measuring 3.0 x 1.5cm compared to his previous chest CT from [facility] when he was undergoing treatment for TB. It was confirmed with [facility] that patient completed RIPE therapy and prolonged therapy with rifampin and isoniazid for cavitary lesion and non-improving clinical status. To workup for TB, 3 cultures of AFB were sent, all negative. Regular sputum culture also did not show abnormality. Due to potential for the necrotic lymph node being malignant vs. TB-related despite the negative AFB, thoracic surgery was consulted (based on prevascular location of the lymph node) for biopsy. Thoracic surgery and infectious disease decided to forgo biopsy at this time and repeat CT chest in 2 months and follow up with infectious disease outpatient.     Patient seen and examined by attending physician on morning of discharge day. Greater than 30 minutes spent on discharge preparation and education.

## 2022-07-10 NOTE — DISCHARGE NOTE PROVIDER - NSFOLLOWUPCLINICS_GEN_ALL_ED_FT
[facility] General Internal Medicine  General Internal Medicine  [address] [id number]  Phone: [phone]  Fax:   Follow Up Time: 1 week

## 2022-07-10 NOTE — DISCHARGE NOTE PROVIDER - CARE PROVIDERS DIRECT ADDRESSES
,[email or web address]

## 2022-07-10 NOTE — PROGRESS NOTE ADULT - PROBLEM SELECTOR PLAN 4
DVT ppx: Hold for now in setting of anemia  Decubiti: None  Code status: Full code  Dispo: PT recommends DC home, will ask if he needs outpt pt, ask if can go today, if so dc pt now.

## 2022-07-10 NOTE — DISCHARGE NOTE PROVIDER - PROVIDER TOKENS
PROVIDER:[TOKEN:[[id number]],FOLLOWUP:[2 months],ESTABLISHEDPATIENT:[T]] PROVIDER:[TOKEN:[[id number]],FOLLOWUP:[2 months],ESTABLISHEDPATIENT:[T]],PROVIDER:[TOKEN:[[id number]],FOLLOWUP:[2 months]]

## 2022-07-10 NOTE — PROGRESS NOTE ADULT - ASSESSMENT
Mr. Syeda Johnson is a 50 yo M w/ PMH of TB and alcohol use, admitted from the ED 7/6 for AMS, he started drinking when he was 21, going through 1.5 bottles of vodka every week, has poor appetite, denied seizures, or prior admissions for alcohol related problem.

## 2022-07-10 NOTE — DISCHARGE NOTE PROVIDER - CARE PROVIDER_API CALL
Tomer Berumen)  Infectious Disease  [address] [place] [address]  Phone: [phone]  Fax: [phone]  Established Patient  Follow Up Time: 2 months   Tomer Berumen)  Infectious Disease  [address] [place] [address]  Phone: [phone]  Fax: [phone]  Established Patient  Follow Up Time: 2 months    Buck Leonardo)  Surgery; Thoracic Surgery  [address] [place] [address], [place] [address]  Phone: [phone]  Fax: [phone]  Follow Up Time: 2 months

## 2022-07-10 NOTE — PROGRESS NOTE ADULT - SUBJECTIVE AND OBJECTIVE BOX
PROGRESS NOTE:   Authored by Dr. Zechariah Rausch  Patient is a 51y old  Male who presents with a chief complaint of ETOH Withdrawal (10 Jul 2022 09:53)      SUBJECTIVE / OVERNIGHT EVENTS:  ON, he had fever of 100.2, hypotension, and tachycardia, tylenol was given, and blood culture, urine cx was sent. He is feeling better, no tremor, no agitation, denied fever, sob, cp, palpitations     ADDITIONAL REVIEW OF SYSTEMS:  Review of Systems:  Constitutional: No fever, No weight loss, good appetite/po intake  Head: No headache   Eyes: No blurry vision, No diplopia  Neuro: No tremors, No muscle weakness   Cardiovascular: No chest pain, No palpitations  Respiratory: No SOB, No cough  GI: No nausea, No vomiting, No diarrhea  : No dysuria, No hematuria  Skin: No rash  MSK: No joint pain   Psych: No depression  Heme: No abnormal bruising, no abnormal bleeding      MEDICATIONS  (STANDING):  folic acid 1 milliGRAM(s) Oral daily  lactated ringers. 1000 milliLiter(s) (75 mL/Hr) IV Continuous <Continuous>  multivitamin 1 Tablet(s) Oral daily  nicotine -   7 mG/24Hr(s) Patch 1 Patch Transdermal daily  pantoprazole  Injectable 40 milliGRAM(s) IV Push every 12 hours  piperacillin/tazobactam IVPB.. 3.375 Gram(s) IV Intermittent every 8 hours  thiamine 100 milliGRAM(s) Oral daily    MEDICATIONS  (PRN):  acetaminophen     Tablet .. 650 milliGRAM(s) Oral every 6 hours PRN Temp greater or equal to 38C (100.4F)  LORazepam     Tablet 2 milliGRAM(s) Oral every 2 hours PRN Symptom-triggered 2 point increase in CIWA-Ar  LORazepam     Tablet 2 milliGRAM(s) Oral every 1 hour PRN Symptom-triggered: each CIWA -Ar score 8 or GREATER      CAPILLARY BLOOD GLUCOSE        I&O's Summary    09 Jul 2022 07:01  -  10 Jul 2022 07:00  --------------------------------------------------------  IN: 1930 mL / OUT: 1975 mL / NET: -45 mL        PHYSICAL EXAM:  Vital Signs Last 24 Hrs  T(C): 37.3 (10 Jul 2022 08:00), Max: 37.9 (10 Jul 2022 04:00)  T(F): 99.2 (10 Jul 2022 08:00), Max: 100.2 (10 Jul 2022 04:00)  HR: 97 (10 Jul 2022 08:00) (86 - 112)  BP: 94/59 (10 Jul 2022 08:00) (90/60 - 105/68)  BP(mean): --  RR: 18 (10 Jul 2022 08:00) (17 - 19)  SpO2: 100% (10 Jul 2022 08:00) (100% - 100%)    Parameters below as of 10 Jul 2022 08:00  Patient On (Oxygen Delivery Method): room air        GENERAL: NAD, sitting comfortably in chair  HEAD: Atraumatic, normocephalic  EYES: EOMI b/l PERRLA b/l, conjunctiva and sclera clear  NECK: Supple, No JVD, No LAD  RESPIRATORY: Normal respiratory effort; lungs are clear to auscultation bilaterally  CARDIOVASCULAR: Regular rate and rhythm, normal S1 and S2, no murmur/rub/gallop; No lower extremity edema  ABDOMEN: Nontender, normoactive bowel sounds, no rebound/guarding; No hepatosplenomegaly  MUSCULOSKELETAL: no clubbing or cyanosis of digits; no joint swelling or tenderness to palpation  NEURO: Non focal, no tremors or asterixis, no fasciculations.   PSYCH: A+O to person, place, and time; affect appropriate    LABS:                        7.6    5.81  )-----------( 254      ( 10 Jul 2022 06:00 )             25.1     07-10    134<L>  |  99  |  6<L>  ----------------------------<  102<H>  3.5   |  23  |  0.66    Ca    8.7      10 Jul 2022 06:00  Phos  3.3     07-10  Mg     1.60     07-10    TPro  6.6  /  Alb  3.3  /  TBili  0.8  /  DBili  x   /  AST  22  /  ALT  11  /  AlkPhos  108  07-10

## 2022-07-10 NOTE — DISCHARGE NOTE PROVIDER - NSDCMRMEDTOKEN_GEN_ALL_CORE_FT
Outpatient Physical Therapy: Outpatient Physical Therapy    ICD 10: Z72. 3   folic acid 1 mg oral tablet: 1 tab(s) orally once a day  Multiple Vitamins oral tablet: 1 tab(s) orally once a day  Outpatient Physical Therapy: Outpatient Physical Therapy    ICD 10: Z72. 3  pantoprazole 40 mg intravenous injection: 40 milligram(s) intravenous every 12 hours  thiamine 100 mg oral tablet: 1 tab(s) orally once a day   folic acid 1 mg oral tablet: 1 tab(s) orally once a day  Multiple Vitamins oral tablet: 1 tab(s) orally once a day  Nicoderm C-Q 7 mg/24 hr transdermal film, extended release: 1 patch transdermally once a day   Outpatient Physical Therapy: Outpatient Physical Therapy    ICD 10: Z72. 3  thiamine 100 mg oral tablet: 1 tab(s) orally once a day

## 2022-07-10 NOTE — DISCHARGE NOTE PROVIDER - NSDCFUADDAPPT_GEN_ALL_CORE_FT
Follow up with your primary care doctor after discharge     [ ] please repeat CT in 2-3 months of chest to assess progression of lymph node    [ ] follow up with Dr. Tomer Berumen in two months (call [phone] to make an appointment)    [ ] please follow up with your primary care doctor within 1 week of discharge. If you do not have a primary care doctor, please call number provided to make an appointment.      [ ] please repeat CT in 8 weeks of chest to assess progression of lymph node. Can f/u with Dr Leonardo to review CT scan results    [ ] follow up with Dr. Tomer Berumen in two months (call [phone] to make an appointment)    [ ] please follow up with your primary care doctor within 1 week of discharge. If you do not have a primary care doctor, please call number provided to make an appointment.        [ ] please repeat CT in 8 weeks of chest to assess progression of lymph node. Can f/u with Dr Leonardo to review CT scan results    [ ] follow up with Dr. Tomer Berumen in two months (call [phone] to make an appointment)    [ ] you have liver damage likely from alcohol. Please call number provided to make an appointment with hepatology.     [ ] please follow up with your primary care doctor within 1 week of discharge. If you do not have a primary care doctor, please call number provided to make an appointment.

## 2022-07-11 LAB
APPEARANCE UR: CLEAR — SIGNIFICANT CHANGE UP
BASE EXCESS BLDV CALC-SCNC: 1.8 MMOL/L — SIGNIFICANT CHANGE UP (ref -2–3)
BILIRUB UR-MCNC: NEGATIVE — SIGNIFICANT CHANGE UP
BLOOD GAS VENOUS COMPREHENSIVE RESULT: SIGNIFICANT CHANGE UP
CHLORIDE BLDV-SCNC: 102 MMOL/L — SIGNIFICANT CHANGE UP (ref 96–108)
CO2 BLDV-SCNC: 29.2 MMOL/L — HIGH (ref 22–26)
COLOR SPEC: COLORLESS — SIGNIFICANT CHANGE UP
DIFF PNL FLD: NEGATIVE — SIGNIFICANT CHANGE UP
GAS PNL BLDV: 134 MMOL/L — LOW (ref 136–145)
GAS PNL BLDV: SIGNIFICANT CHANGE UP
GLUCOSE BLDV-MCNC: 105 MG/DL — HIGH (ref 70–99)
GLUCOSE UR QL: NEGATIVE — SIGNIFICANT CHANGE UP
HCO3 BLDV-SCNC: 28 MMOL/L — SIGNIFICANT CHANGE UP (ref 22–29)
HCT VFR BLDA CALC: 36 % — LOW (ref 39–51)
HGB BLD CALC-MCNC: 12 G/DL — LOW (ref 13–17)
KETONES UR-MCNC: NEGATIVE — SIGNIFICANT CHANGE UP
LACTATE BLDV-MCNC: 1.6 MMOL/L — SIGNIFICANT CHANGE UP (ref 0.5–2)
LEUKOCYTE ESTERASE UR-ACNC: NEGATIVE — SIGNIFICANT CHANGE UP
NITRITE UR-MCNC: NEGATIVE — SIGNIFICANT CHANGE UP
PCO2 BLDV: 48 MMHG — SIGNIFICANT CHANGE UP (ref 42–55)
PH BLDV: 7.37 — SIGNIFICANT CHANGE UP (ref 7.32–7.43)
PH UR: 7 — SIGNIFICANT CHANGE UP (ref 5–8)
PO2 BLDV: <20 MMHG — SIGNIFICANT CHANGE UP
POTASSIUM BLDV-SCNC: 4.3 MMOL/L — SIGNIFICANT CHANGE UP (ref 3.5–5.1)
PROT UR-MCNC: NEGATIVE — SIGNIFICANT CHANGE UP
SAO2 % BLDV: 20.5 % — SIGNIFICANT CHANGE UP
SP GR SPEC: 1 — SIGNIFICANT CHANGE UP (ref 1–1.05)
UROBILINOGEN FLD QL: SIGNIFICANT CHANGE UP

## 2022-07-11 PROCEDURE — 71045 X-RAY EXAM CHEST 1 VIEW: CPT | Mod: 26

## 2022-07-11 PROCEDURE — 99232 SBSQ HOSP IP/OBS MODERATE 35: CPT | Mod: GC

## 2022-07-11 RX ORDER — SODIUM CHLORIDE 9 MG/ML
500 INJECTION, SOLUTION INTRAVENOUS ONCE
Refills: 0 | Status: COMPLETED | OUTPATIENT
Start: 2022-07-11 | End: 2022-07-11

## 2022-07-11 RX ADMIN — PIPERACILLIN AND TAZOBACTAM 25 GRAM(S): 4; .5 INJECTION, POWDER, LYOPHILIZED, FOR SOLUTION INTRAVENOUS at 02:11

## 2022-07-11 RX ADMIN — PANTOPRAZOLE SODIUM 40 MILLIGRAM(S): 20 TABLET, DELAYED RELEASE ORAL at 05:23

## 2022-07-11 RX ADMIN — Medication 1 PATCH: at 13:11

## 2022-07-11 RX ADMIN — Medication 100 MILLIGRAM(S): at 13:05

## 2022-07-11 RX ADMIN — SODIUM CHLORIDE 500 MILLILITER(S): 9 INJECTION, SOLUTION INTRAVENOUS at 14:12

## 2022-07-11 RX ADMIN — Medication 1 PATCH: at 18:36

## 2022-07-11 RX ADMIN — Medication 1 TABLET(S): at 13:05

## 2022-07-11 RX ADMIN — PIPERACILLIN AND TAZOBACTAM 25 GRAM(S): 4; .5 INJECTION, POWDER, LYOPHILIZED, FOR SOLUTION INTRAVENOUS at 10:36

## 2022-07-11 RX ADMIN — Medication 1 PATCH: at 06:27

## 2022-07-11 RX ADMIN — Medication 1 MILLIGRAM(S): at 13:05

## 2022-07-11 RX ADMIN — PANTOPRAZOLE SODIUM 40 MILLIGRAM(S): 20 TABLET, DELAYED RELEASE ORAL at 17:13

## 2022-07-11 RX ADMIN — Medication 1 PATCH: at 13:06

## 2022-07-11 NOTE — PROGRESS NOTE ADULT - PROBLEM SELECTOR PLAN 4
Patient presented w/ hypokalemia and hypomagnesemia on admission  - now s/p multiple doses of K and Mg  - monitor BMP, replete as needed   - Hypocalcemia: albumin 2.8 corrected to 9.2 Patient presented w/ hypokalemia and hypomagnesemia on admission. Last BMP 7/10/22  - now s/p multiple doses of K and Mg  - monitor BMP, replete as needed   - Hypocalcemia: albumin 2.8 corrected to 9.2

## 2022-07-11 NOTE — PROGRESS NOTE ADULT - PROBLEM SELECTOR PLAN 2
Patient continues to spike low grade fever while being treated with Zosyn (Day 5). Today 100.7 around noon and 99.4 on repeat at 1:30pm. Also hypotensive to 82/49 around noon. 7/11/22  - Monitor temperature off Zosyn  - Repeat blood culture, urine culture, CXR, and lactate Patient continues to spike low grade fever while being treated with Zosyn (Day 5). Today 100.7 around noon and 99.4 on repeat at 1:30pm. Also hypotensive to 82/49 around noon. BCx NGT 7/11/22, plan for repeat labs  - Monitor temperature off Zosyn  - Repeat blood culture, urine culture, CXR, and lactate

## 2022-07-11 NOTE — PROGRESS NOTE ADULT - SUBJECTIVE AND OBJECTIVE BOX
PROGRESS NOTE:   Authored by Dr. Lauren Montague    Patient is a 51y old  Male who presents with a chief complaint of ETOH Withdrawal (10 Jul 2022 09:53)      SUBJECTIVE / OVERNIGHT EVENTS:    ADDITIONAL REVIEW OF SYSTEMS:    MEDICATIONS  (STANDING):  folic acid 1 milliGRAM(s) Oral daily  lactated ringers Bolus 500 milliLiter(s) IV Bolus once  multivitamin 1 Tablet(s) Oral daily  nicotine -   7 mG/24Hr(s) Patch 1 Patch Transdermal daily  pantoprazole  Injectable 40 milliGRAM(s) IV Push every 12 hours  thiamine 100 milliGRAM(s) Oral daily    MEDICATIONS  (PRN):  acetaminophen     Tablet .. 650 milliGRAM(s) Oral every 6 hours PRN Temp greater or equal to 38C (100.4F)  LORazepam     Tablet 2 milliGRAM(s) Oral every 2 hours PRN Symptom-triggered 2 point increase in CIWA-Ar  LORazepam     Tablet 2 milliGRAM(s) Oral every 1 hour PRN Symptom-triggered: each CIWA -Ar score 8 or GREATER      CAPILLARY BLOOD GLUCOSE        I&O's Summary    10 Jul 2022 07:01  -  2022 07:00  --------------------------------------------------------  IN: 1700 mL / OUT: 0 mL / NET: 1700 mL        PHYSICAL EXAM:  Vital Signs Last 24 Hrs  T(C): 37.5 (2022 13:34), Max: 38.2 (2022 12:00)  T(F): 99.5 (2022 13:34), Max: 100.7 (2022 12:00)  HR: 100 (2022 13:34) (75 - 100)  BP: 95/61 (2022 13:34) (82/49 - 107/65)  BP(mean): --  RR: 17 (2022 13:34) (16 - 17)  SpO2: 100% (2022 13:34) (99% - 100%)    Parameters below as of 2022 12:00  Patient On (Oxygen Delivery Method): room air        GENERAL: NAD, lying comfortably in bed  HEAD: Atraumatic, normocephalic  EYES: EOMI b/l PERRLA b/l, conjunctiva and sclera clear  NECK: Supple, No JVD, No LAD  RESPIRATORY: Normal respiratory effort; lungs are clear to auscultation bilaterally  CARDIOVASCULAR: Regular rate and rhythm, normal S1 and S2, no murmur/rub/gallop; No lower extremity edema  ABDOMEN: Nontender, normoactive bowel sounds, no rebound/guarding; No hepatosplenomegaly  MUSCULOSKELETAL: no clubbing or cyanosis of digits; no joint swelling or tenderness to palpation  NEURO: Non focal   PSYCH: A+O to person, place, and time; affect appropriate    LABS:                          7.6    5.81  )-----------( 254      ( 10 Jul 2022 06:00 )             25.1     07-10    134<L>  |  99  |  6<L>  ----------------------------<  102<H>  3.5   |  23  |  0.66    Ca    8.7      10 Jul 2022 06:00  Phos  3.3     07-10  Mg     1.60     07-10    TPro  6.6  /  Alb  3.3  /  TBili  0.8  /  DBili  x   /  AST  22  /  ALT  11  /  AlkPhos  108  07-10          Urinalysis Basic - ( 10 Jul 2022 17:52 )    Color: Yellow / Appearance: Clear / S.016 / pH: x  Gluc: x / Ketone: Negative  / Bili: Negative / Urobili: <2 mg/dL   Blood: x / Protein: Negative / Nitrite: Negative   Leuk Esterase: Negative / RBC: x / WBC x   Sq Epi: x / Non Sq Epi: x / Bacteria: x        Culture - Blood (collected 10 Jul 2022 05:55)  Source: .Blood Blood-Peripheral  Preliminary Report (2022 13:01):    No growth to date.    Culture - Blood (collected 10 Jul 2022 05:40)  Source: .Blood Blood-Peripheral  Preliminary Report (2022 13:01):    No growth to date.       PROGRESS NOTE:   Authored by Dr. Lauren Montague    Patient is a 51y old  Male who presents with a chief complaint of ETOH Withdrawal (10 Jul 2022 09:53)      SUBJECTIVE / OVERNIGHT EVENTS:  Patient brief episode of confusion overnight thinking that someone was waiting for him downstairs. Patient reported pulled his IV. No other events. VSS.       ADDITIONAL REVIEW OF SYSTEMS:  CONSTITUTIONAL: No fatigue  EYES: No eye pain, visual disturbances, or discharge  ENMT:  No difficulty hearing  RESPIRATORY: No cough, wheezing; No shortness of breath  CARDIOVASCULAR: No chest pain or leg swelling  GASTROINTESTINAL: No abdominal or epigastric pain. No nausea, vomiting, or hematemesis  GENITOURINARY: No incontinence  NEUROLOGICAL: No headaches, loss of strength, or tremors  SKIN: No itching  ENDOCRINE: No heat or cold intolerance  MUSCULOSKELETAL: No joint pain or swelling  ALLERGY AND IMMUNOLOGIC: No hives or eczema    MEDICATIONS  (STANDING):  folic acid 1 milliGRAM(s) Oral daily  lactated ringers Bolus 500 milliLiter(s) IV Bolus once  multivitamin 1 Tablet(s) Oral daily  nicotine -   7 mG/24Hr(s) Patch 1 Patch Transdermal daily  pantoprazole  Injectable 40 milliGRAM(s) IV Push every 12 hours  thiamine 100 milliGRAM(s) Oral daily    MEDICATIONS  (PRN):  acetaminophen     Tablet .. 650 milliGRAM(s) Oral every 6 hours PRN Temp greater or equal to 38C (100.4F)  LORazepam     Tablet 2 milliGRAM(s) Oral every 2 hours PRN Symptom-triggered 2 point increase in CIWA-Ar  LORazepam     Tablet 2 milliGRAM(s) Oral every 1 hour PRN Symptom-triggered: each CIWA -Ar score 8 or GREATER      CAPILLARY BLOOD GLUCOSE        I&O's Summary    10 Jul 2022 07:01  -  2022 07:00  --------------------------------------------------------  IN: 1700 mL / OUT: 0 mL / NET: 1700 mL        PHYSICAL EXAM:  Vital Signs Last 24 Hrs  T(C): 37.5 (2022 13:34), Max: 38.2 (2022 12:00)  T(F): 99.5 (2022 13:34), Max: 100.7 (2022 12:00)  HR: 100 (2022 13:34) (75 - 100)  BP: 95/61 (2022 13:34) (82/49 - 107/65)  BP(mean): --  RR: 17 (2022 13:34) (16 - 17)  SpO2: 100% (2022 13:34) (99% - 100%)    Parameters below as of 2022 12:00  Patient On (Oxygen Delivery Method): room air        GENERAL: NAD, sitting on bed  HEAD: Atraumatic, normocephalic  EYES: EOMI b/l, conjunctiva and sclera clear  NECK: Supple   RESPIRATORY: Normal respiratory effort; lungs are clear to auscultation bilaterally  CARDIOVASCULAR: Regular rate and rhythm, normal S1 and S2, no murmur/rub/gallop; No lower extremity edema  ABDOMEN: Nontender, no rebound/guarding   MUSCULOSKELETAL: no joint swelling or tenderness to palpation  NEURO: Non focal, no tremors   PSYCH: A+O to person, place, and time; affect appropriate    LABS:                          7.6    5.81  )-----------( 254      ( 10 Jul 2022 06:00 )             25.1     07-10    134<L>  |  99  |  6<L>  ----------------------------<  102<H>  3.5   |  23  |  0.66    Ca    8.7      10 Jul 2022 06:00  Phos  3.3     07-10  Mg     1.60     07-10    TPro  6.6  /  Alb  3.3  /  TBili  0.8  /  DBili  x   /  AST  22  /  ALT  11  /  AlkPhos  108  07-10          Urinalysis Basic - ( 10 Jul 2022 17:52 )    Color: Yellow / Appearance: Clear / S.016 / pH: x  Gluc: x / Ketone: Negative  / Bili: Negative / Urobili: <2 mg/dL   Blood: x / Protein: Negative / Nitrite: Negative   Leuk Esterase: Negative / RBC: x / WBC x   Sq Epi: x / Non Sq Epi: x / Bacteria: x        Culture - Blood (collected 10 Jul 2022 05:55)  Source: .Blood Blood-Peripheral  Preliminary Report (2022 13:01):    No growth to date.    Culture - Blood (collected 10 Jul 2022 05:40)  Source: .Blood Blood-Peripheral  Preliminary Report (2022 13:01):    No growth to date.

## 2022-07-11 NOTE — PROGRESS NOTE ADULT - PROBLEM SELECTOR PLAN 1
Patient presented w/ AMS and reported visual hallucinations as per sister concerning for alcohol-induced psychosis vs withdrawal vs DT  ---- Low fever to 100.2, WBC 4.98, UA negative, 7/6 BCx NGT, he was started on Zosyn empirically (will complete 7 days total)  - Does report fall recently (~2 weeks ago), however CTH negative in ED on 7/6  - Loaded w/ 600 mg phenobarb IV & started on precedex on transfer to MICU  ---- Precedex discontinued, no further agitation/restlessness and not requiring phenobarb.  - Thiamine 500mg IVPB x 3 days, now Thiamine 100mg PO QD + Folate  - mild transaminitis; now resolved  - On sxs triggered CIWA, CIWA this am has been 0, 1.  - Due to low BP, he is on LR 75ml/hr for 12 hours    - SBIRT consulted; pt uncooperative refer to SW note  - PT consult for unsteady gait Patient presented w/ AMS and reported visual hallucinations as per sister concerning for alcohol-induced psychosis vs withdrawal vs DT. CIWA score 1 overnight.     - Does report fall recently (~2 weeks ago), however CTH negative in ED on 7/6  - Loaded w/ 600 mg phenobarb IV & started on precedex on transfer to MICU  ---- Precedex discontinued, no further agitation/restlessness and not requiring phenobarb.  - Thiamine 500mg IVPB x 3 days, now Thiamine 100mg PO QD + Folate  - mild transaminitis; now resolved  - Due to low BP, he is on LR 75ml/hr for 12 hours    - SBIRT consulted; pt uncooperative refer to SW note  - PT consult for unsteady gait

## 2022-07-12 LAB
ALBUMIN SERPL ELPH-MCNC: 3.5 G/DL — SIGNIFICANT CHANGE UP (ref 3.3–5)
ALP SERPL-CCNC: 96 U/L — SIGNIFICANT CHANGE UP (ref 40–120)
ALT FLD-CCNC: 11 U/L — SIGNIFICANT CHANGE UP (ref 4–41)
ANION GAP SERPL CALC-SCNC: 12 MMOL/L — SIGNIFICANT CHANGE UP (ref 7–14)
AST SERPL-CCNC: 23 U/L — SIGNIFICANT CHANGE UP (ref 4–40)
B PERT DNA SPEC QL NAA+PROBE: SIGNIFICANT CHANGE UP
B PERT+PARAPERT DNA PNL SPEC NAA+PROBE: SIGNIFICANT CHANGE UP
BILIRUB SERPL-MCNC: 0.6 MG/DL — SIGNIFICANT CHANGE UP (ref 0.2–1.2)
BORDETELLA PARAPERTUSSIS (RAPRVP): SIGNIFICANT CHANGE UP
BUN SERPL-MCNC: 4 MG/DL — LOW (ref 7–23)
C PNEUM DNA SPEC QL NAA+PROBE: SIGNIFICANT CHANGE UP
CALCIUM SERPL-MCNC: 9.2 MG/DL — SIGNIFICANT CHANGE UP (ref 8.4–10.5)
CHLORIDE SERPL-SCNC: 100 MMOL/L — SIGNIFICANT CHANGE UP (ref 98–107)
CO2 SERPL-SCNC: 25 MMOL/L — SIGNIFICANT CHANGE UP (ref 22–31)
CREAT SERPL-MCNC: 0.6 MG/DL — SIGNIFICANT CHANGE UP (ref 0.5–1.3)
CRP SERPL-MCNC: 35.2 MG/L — HIGH
CULTURE RESULTS: SIGNIFICANT CHANGE UP
CULTURE RESULTS: SIGNIFICANT CHANGE UP
EGFR: 118 ML/MIN/1.73M2 — SIGNIFICANT CHANGE UP
ERYTHROCYTE [SEDIMENTATION RATE] IN BLOOD: 105 MM/HR — HIGH (ref 1–15)
FLUAV SUBTYP SPEC NAA+PROBE: SIGNIFICANT CHANGE UP
FLUBV RNA SPEC QL NAA+PROBE: SIGNIFICANT CHANGE UP
FOLATE SERPL-MCNC: 20 NG/ML — HIGH (ref 3.1–17.5)
GLUCOSE SERPL-MCNC: 95 MG/DL — SIGNIFICANT CHANGE UP (ref 70–99)
HADV DNA SPEC QL NAA+PROBE: SIGNIFICANT CHANGE UP
HAPTOGLOB SERPL-MCNC: 210 MG/DL — HIGH (ref 34–200)
HCOV 229E RNA SPEC QL NAA+PROBE: SIGNIFICANT CHANGE UP
HCOV HKU1 RNA SPEC QL NAA+PROBE: SIGNIFICANT CHANGE UP
HCOV NL63 RNA SPEC QL NAA+PROBE: SIGNIFICANT CHANGE UP
HCOV OC43 RNA SPEC QL NAA+PROBE: SIGNIFICANT CHANGE UP
HCT VFR BLD CALC: 25.3 % — LOW (ref 39–50)
HGB BLD-MCNC: 8 G/DL — LOW (ref 13–17)
HMPV RNA SPEC QL NAA+PROBE: SIGNIFICANT CHANGE UP
HPIV1 RNA SPEC QL NAA+PROBE: SIGNIFICANT CHANGE UP
HPIV2 RNA SPEC QL NAA+PROBE: SIGNIFICANT CHANGE UP
HPIV3 RNA SPEC QL NAA+PROBE: SIGNIFICANT CHANGE UP
HPIV4 RNA SPEC QL NAA+PROBE: SIGNIFICANT CHANGE UP
LDH SERPL L TO P-CCNC: 237 U/L — HIGH (ref 135–225)
M PNEUMO DNA SPEC QL NAA+PROBE: SIGNIFICANT CHANGE UP
MAGNESIUM SERPL-MCNC: 1.6 MG/DL — SIGNIFICANT CHANGE UP (ref 1.6–2.6)
MCHC RBC-ENTMCNC: 31.6 GM/DL — LOW (ref 32–36)
MCHC RBC-ENTMCNC: 32.7 PG — SIGNIFICANT CHANGE UP (ref 27–34)
MCV RBC AUTO: 103.3 FL — HIGH (ref 80–100)
NRBC # BLD: 0 /100 WBCS — SIGNIFICANT CHANGE UP
NRBC # FLD: 0 K/UL — SIGNIFICANT CHANGE UP
PHOSPHATE SERPL-MCNC: 3.8 MG/DL — SIGNIFICANT CHANGE UP (ref 2.5–4.5)
PLATELET # BLD AUTO: 388 K/UL — SIGNIFICANT CHANGE UP (ref 150–400)
POTASSIUM SERPL-MCNC: 4.1 MMOL/L — SIGNIFICANT CHANGE UP (ref 3.5–5.3)
POTASSIUM SERPL-SCNC: 4.1 MMOL/L — SIGNIFICANT CHANGE UP (ref 3.5–5.3)
PROT SERPL-MCNC: 6.7 G/DL — SIGNIFICANT CHANGE UP (ref 6–8.3)
RAPID RVP RESULT: SIGNIFICANT CHANGE UP
RBC # BLD: 2.45 M/UL — LOW (ref 4.2–5.8)
RBC # BLD: 2.45 M/UL — LOW (ref 4.2–5.8)
RBC # FLD: 13.9 % — SIGNIFICANT CHANGE UP (ref 10.3–14.5)
RETICS #: 92.1 K/UL — SIGNIFICANT CHANGE UP (ref 25–125)
RETICS/RBC NFR: 3.8 % — HIGH (ref 0.5–2.5)
RSV RNA SPEC QL NAA+PROBE: SIGNIFICANT CHANGE UP
RV+EV RNA SPEC QL NAA+PROBE: SIGNIFICANT CHANGE UP
SARS-COV-2 RNA SPEC QL NAA+PROBE: SIGNIFICANT CHANGE UP
SODIUM SERPL-SCNC: 137 MMOL/L — SIGNIFICANT CHANGE UP (ref 135–145)
SPECIMEN SOURCE: SIGNIFICANT CHANGE UP
SPECIMEN SOURCE: SIGNIFICANT CHANGE UP
WBC # BLD: 7.94 K/UL — SIGNIFICANT CHANGE UP (ref 3.8–10.5)
WBC # FLD AUTO: 7.94 K/UL — SIGNIFICANT CHANGE UP (ref 3.8–10.5)

## 2022-07-12 PROCEDURE — 93010 ELECTROCARDIOGRAM REPORT: CPT

## 2022-07-12 PROCEDURE — 99233 SBSQ HOSP IP/OBS HIGH 50: CPT | Mod: GC

## 2022-07-12 RX ORDER — ENOXAPARIN SODIUM 100 MG/ML
30 INJECTION SUBCUTANEOUS EVERY 24 HOURS
Refills: 0 | Status: DISCONTINUED | OUTPATIENT
Start: 2022-07-12 | End: 2022-07-20

## 2022-07-12 RX ORDER — SODIUM CHLORIDE 9 MG/ML
250 INJECTION, SOLUTION INTRAVENOUS
Refills: 0 | Status: DISCONTINUED | OUTPATIENT
Start: 2022-07-12 | End: 2022-07-13

## 2022-07-12 RX ADMIN — Medication 1 PATCH: at 19:32

## 2022-07-12 RX ADMIN — Medication 1 PATCH: at 11:30

## 2022-07-12 RX ADMIN — Medication 1 PATCH: at 06:33

## 2022-07-12 RX ADMIN — Medication 1 TABLET(S): at 11:31

## 2022-07-12 RX ADMIN — SODIUM CHLORIDE 50 MILLILITER(S): 9 INJECTION, SOLUTION INTRAVENOUS at 16:12

## 2022-07-12 RX ADMIN — Medication 1 MILLIGRAM(S): at 11:31

## 2022-07-12 RX ADMIN — Medication 100 MILLIGRAM(S): at 11:31

## 2022-07-12 RX ADMIN — ENOXAPARIN SODIUM 30 MILLIGRAM(S): 100 INJECTION SUBCUTANEOUS at 17:27

## 2022-07-12 RX ADMIN — PANTOPRAZOLE SODIUM 40 MILLIGRAM(S): 20 TABLET, DELAYED RELEASE ORAL at 05:57

## 2022-07-12 RX ADMIN — PANTOPRAZOLE SODIUM 40 MILLIGRAM(S): 20 TABLET, DELAYED RELEASE ORAL at 17:28

## 2022-07-12 NOTE — PROGRESS NOTE ADULT - PROBLEM SELECTOR PLAN 4
Patient presented w/ hypokalemia and hypomagnesemia on admission. Last BMP 7/10/22  - now s/p multiple doses of K and Mg  - monitor BMP, replete as needed   - Hypocalcemia: albumin 2.8 corrected to 9.2 RESOLVED  Patient presented w/ hypokalemia and hypomagnesemia on admission. Last BMP this AM normal.  - now s/p multiple doses of K and Mg  - monitor BMP, replete as needed   - Hypocalcemia: albumin 2.8 corrected to 9.2

## 2022-07-12 NOTE — PROGRESS NOTE ADULT - PROBLEM SELECTOR PLAN 1
Patient presented w/ AMS and reported visual hallucinations as per sister concerning for alcohol-induced psychosis vs withdrawal vs DT. CIWA score 1 overnight.     - Does report fall recently (~2 weeks ago), however CTH negative in ED on 7/6  - Loaded w/ 600 mg phenobarb IV & started on precedex on transfer to MICU  ---- Precedex discontinued, no further agitation/restlessness and not requiring phenobarb.  - Thiamine 500mg IVPB x 3 days, now Thiamine 100mg PO QD + Folate  - mild transaminitis; now resolved  - Due to low BP, he is on LR 75ml/hr for 12 hours    - SBIRT consulted; pt uncooperative refer to SW note  - PT consult for unsteady gait Patient presented w/ AMS and reported visual hallucinations as per sister concerning for alcohol-induced psychosis vs withdrawal vs DT. CIWA score has been ranging <6 in the past 72 hours and ROJAS was discharged overnight 7/12/22.    - Does report fall recently (~2 weeks ago), however CTH negative in ED on 7/6  - Loaded w/ 600 mg phenobarb IV & started on precedex on transfer to MICU  ---- Precedex discontinued, no further agitation/restlessness and not requiring phenobarb.  - Thiamine 500mg IVPB x 3 days, now Thiamine 100mg PO QD + Folate  - mild transaminitis; now resolved  - Due to low BP, he is on LR 75ml/hr for 12 hours    - SBIRT consulted; pt uncooperative refer to SW note  - PT consult for unsteady gait

## 2022-07-12 NOTE — PROGRESS NOTE ADULT - PROBLEM SELECTOR PLAN 3
Pt was found to have Hb 7.1 from previous Hb 2 weeks ago was 12.3  - initially no reports of bleeding, pt denies any bloody vomiting or stools. However pt family member reported seeing blood in toilet & sink 2 days prior to admission  - started on IV PPI BID 7/6  - cbc q12, transfuse Hb <7  - GI consulted, Anemia, macrocytic. Hgb stable. Patient denies hematochezia, melena. Given concomitant thrombocytopenia, likely in setting of chronic alcohol use  - no plan for GI intervention at this time  - H/H monitoring  - no melena/hematochezia or other signs of active bleed during admission RESOLVEd  Pt was found to have Hb 7.1 from previous Hb 2 weeks ago was 12.3.  Today on repeat CBC: 8.0/25.3 Plt 388.    - initially no reports of bleeding, pt denies any bloody vomiting or stools. However pt family member reported seeing blood in toilet & sink 2 days prior to admission  - started on IV PPI BID 7/6  - cbc q12, transfuse Hb <7  - GI consulted, Anemia, macrocytic. Hgb stable. Patient denies hematochezia, melena. Given concomitant thrombocytopenia, likely in setting of chronic alcohol use  - no plan for GI intervention at this time  - H/H monitoring  - no melena/hematochezia or other signs of active bleed during admission

## 2022-07-12 NOTE — PROGRESS NOTE ADULT - PROBLEM SELECTOR PLAN 2
Patient continues to spike low grade fever while being treated with Zosyn (Day 5). Today 100.7 around noon and 99.4 on repeat at 1:30pm. Also hypotensive to 82/49 around noon. BCx NGT 7/11/22, plan for repeat labs  - Monitor temperature off Zosyn  - Repeat blood culture, urine culture, CXR, and lactate Patient continues to spike low grade fever while being treated with Zosyn (Day 5). 7/11/22: 100.7 around noon and 99.4 on repeat at 1:30pm. Also hypotensive to 82/49 around noon.  Afebrile overnight and in the AM today (7/12/22).    BCx 7/6 NEG, 7/10 NGT  7/12  UA negative 7/11  Lactate 1.6 7/11  CXR 7/11: pending  - Monitor temperature off Zosyn  - f/u CXR

## 2022-07-12 NOTE — PROGRESS NOTE ADULT - PROBLEM SELECTOR PLAN 5
DVT ppx: Hold for now in setting of anemia  Decubiti: None  Code status: Full code  Dispo: PT recommends DC home, will ask if he needs outpt pt, ask if can go today, if so dc pt now. DVT ppx: Hold for now in setting of anemia  - Restart?  Decubiti: None  Code status: Full code  Dispo: PT recommends DC home, will ask if he needs outpt pt, ask if can go today, if so dc pt now.

## 2022-07-12 NOTE — PROGRESS NOTE ADULT - SUBJECTIVE AND OBJECTIVE BOX
PROGRESS NOTE:   Authored by Dr. Lauren Montague    Patient is a 51y old  Male who presents with a chief complaint of ETOH Withdrawal (2022 14:11)      SUBJECTIVE / OVERNIGHT EVENTS:    ADDITIONAL REVIEW OF SYSTEMS:    MEDICATIONS  (STANDING):  folic acid 1 milliGRAM(s) Oral daily  multivitamin 1 Tablet(s) Oral daily  nicotine -   7 mG/24Hr(s) Patch 1 Patch Transdermal daily  pantoprazole  Injectable 40 milliGRAM(s) IV Push every 12 hours  thiamine 100 milliGRAM(s) Oral daily    MEDICATIONS  (PRN):  acetaminophen     Tablet .. 650 milliGRAM(s) Oral every 6 hours PRN Temp greater or equal to 38C (100.4F)  LORazepam     Tablet 2 milliGRAM(s) Oral every 2 hours PRN Symptom-triggered 2 point increase in CIWA-Ar  LORazepam     Tablet 2 milliGRAM(s) Oral every 1 hour PRN Symptom-triggered: each CIWA -Ar score 8 or GREATER      CAPILLARY BLOOD GLUCOSE        I&O's Summary    2022 07:01  -  2022 07:00  --------------------------------------------------------  IN: 2040 mL / OUT: 650 mL / NET: 1390 mL        PHYSICAL EXAM:  Vital Signs Last 24 Hrs  T(C): 37.1 (2022 05:32), Max: 38.2 (2022 12:00)  T(F): 98.7 (2022 05:32), Max: 100.7 (2022 12:00)  HR: 98 (2022 05:32) (75 - 100)  BP: 109/58 (2022 05:32) (82/49 - 109/58)  BP(mean): --  RR: 17 (2022 05:32) (16 - 17)  SpO2: 97% (2022 05:32) (97% - 100%)    Parameters below as of 2022 05:32  Patient On (Oxygen Delivery Method): room air        GENERAL: NAD, lying comfortably in bed  HEAD: Atraumatic, normocephalic  EYES: EOMI b/l PERRLA b/l, conjunctiva and sclera clear  NECK: Supple, No JVD, No LAD  RESPIRATORY: Normal respiratory effort; lungs are clear to auscultation bilaterally  CARDIOVASCULAR: Regular rate and rhythm, normal S1 and S2, no murmur/rub/gallop; No lower extremity edema  ABDOMEN: Nontender, normoactive bowel sounds, no rebound/guarding; No hepatosplenomegaly  MUSCULOSKELETAL: no clubbing or cyanosis of digits; no joint swelling or tenderness to palpation  NEURO: Non focal   PSYCH: A+O to person, place, and time; affect appropriate    LABS:                          8.0    7.94  )-----------( x        ( 2022 05:46 )             25.3                 Urinalysis Basic - ( 2022 16:54 )    Color: Colorless / Appearance: Clear / S.004 / pH: x  Gluc: x / Ketone: Negative  / Bili: Negative / Urobili: <2 mg/dL   Blood: x / Protein: Negative / Nitrite: Negative   Leuk Esterase: Negative / RBC: x / WBC x   Sq Epi: x / Non Sq Epi: x / Bacteria: x        Culture - Blood (collected 10 Jul 2022 05:55)  Source: .Blood Blood-Peripheral  Preliminary Report (2022 13:01):    No growth to date.    Culture - Blood (collected 10 Jul 2022 05:40)  Source: .Blood Blood-Peripheral  Preliminary Report (2022 13:01):    No growth to date.       PROGRESS NOTE:   Authored by Dr. Dylan Verdugo    Patient is a 51y old  Male who presents with a chief complaint of ETOH Withdrawal (2022 14:11)      SUBJECTIVE / OVERNIGHT EVENTS: No acute events overnight. CIWA discontinued. This morning, patient says he has been having some throat itchiness with intermittent dry coughs x 2-3 days. Also having bowel movements ~4 times a day, which is more than his usual. Stool is not loose.     ADDITIONAL REVIEW OF SYSTEMS:  CONSTITUTIONAL: No fever, or fatigue  EYES: No eye pain, visual disturbances, or discharge  ENMT:  +Throat itchiness denies pain; No difficulty hearing  RESPIRATORY: + dry intermittent cough; denies chills or hemoptysis; No shortness of breath  CARDIOVASCULAR: No chest pain or leg swelling  GASTROINTESTINAL: +frequent bowel movement (x4/day, no loose stools). No abdominal or epigastric pain. No nausea, vomiting, or hematemesis.   GENITOURINARY: No dysuria, frequency, hematuria, or incontinence  NEUROLOGICAL: No headaches, loss of strength,  SKIN: No itching, burning, rashes, or lesions   ENDOCRINE: No heat or cold intolerance; No polydipsia or polyuria  MUSCULOSKELETAL: No joint pain or swelling;         MEDICATIONS  (STANDING):  folic acid 1 milliGRAM(s) Oral daily  multivitamin 1 Tablet(s) Oral daily  nicotine -   7 mG/24Hr(s) Patch 1 Patch Transdermal daily  pantoprazole  Injectable 40 milliGRAM(s) IV Push every 12 hours  thiamine 100 milliGRAM(s) Oral daily    MEDICATIONS  (PRN):  acetaminophen     Tablet .. 650 milliGRAM(s) Oral every 6 hours PRN Temp greater or equal to 38C (100.4F)  LORazepam     Tablet 2 milliGRAM(s) Oral every 2 hours PRN Symptom-triggered 2 point increase in CIWA-Ar  LORazepam     Tablet 2 milliGRAM(s) Oral every 1 hour PRN Symptom-triggered: each CIWA -Ar score 8 or GREATER      CAPILLARY BLOOD GLUCOSE        I&O's Summary    2022 07:01  -  2022 07:00  --------------------------------------------------------  IN: 2040 mL / OUT: 650 mL / NET: 1390 mL        PHYSICAL EXAM:  Vital Signs Last 24 Hrs  T(C): 37.1 (2022 05:32), Max: 38.2 (2022 12:00)  T(F): 98.7 (2022 05:32), Max: 100.7 (2022 12:00)  HR: 98 (2022 05:32) (75 - 100)  BP: 109/58 (2022 05:32) (82/49 - 109/58)  BP(mean): --  RR: 17 (2022 05:32) (16 - 17)  SpO2: 97% (2022 05:32) (97% - 100%)    Parameters below as of 2022 05:32  Patient On (Oxygen Delivery Method): room air        GENERAL: NAD, lying comfortably in bed  HEAD: Atraumatic, normocephalic  EYES: EOMI b/l PERRLA b/l, conjunctiva and sclera clear  NECK: Supple   RESPIRATORY: Faint expiratory wheezing in the YOLANDA. Otherwise clear to auscultation, no crackles. Normal respiratory effort  CARDIOVASCULAR: Regular rate and rhythm, normal S1 and S2, no murmur/rub/gallop; No lower extremity edema  ABDOMEN: Nontender, no rebound/guarding  NEURO: Non focal   PSYCH: A+O to person, place, and time; affect appropriate    LABS:                          8.0    7.94  )-----------( x        ( 2022 05:46 )             25.3                 Urinalysis Basic - ( 2022 16:54 )    Color: Colorless / Appearance: Clear / S.004 / pH: x  Gluc: x / Ketone: Negative  / Bili: Negative / Urobili: <2 mg/dL   Blood: x / Protein: Negative / Nitrite: Negative   Leuk Esterase: Negative / RBC: x / WBC x   Sq Epi: x / Non Sq Epi: x / Bacteria: x        Culture - Blood (collected 10 Jul 2022 05:55)  Source: .Blood Blood-Peripheral  Preliminary Report (2022 13:01):    No growth to date.    Culture - Blood (collected 10 Jul 2022 05:40)  Source: .Blood Blood-Peripheral  Preliminary Report (2022 13:01):    No growth to date.

## 2022-07-13 LAB
ALBUMIN SERPL ELPH-MCNC: 3.4 G/DL — SIGNIFICANT CHANGE UP (ref 3.3–5)
ALP SERPL-CCNC: 95 U/L — SIGNIFICANT CHANGE UP (ref 40–120)
ALT FLD-CCNC: 11 U/L — SIGNIFICANT CHANGE UP (ref 4–41)
ANA TITR SER: NEGATIVE — SIGNIFICANT CHANGE UP
ANION GAP SERPL CALC-SCNC: 14 MMOL/L — SIGNIFICANT CHANGE UP (ref 7–14)
AST SERPL-CCNC: 20 U/L — SIGNIFICANT CHANGE UP (ref 4–40)
BILIRUB SERPL-MCNC: 0.5 MG/DL — SIGNIFICANT CHANGE UP (ref 0.2–1.2)
BUN SERPL-MCNC: 9 MG/DL — SIGNIFICANT CHANGE UP (ref 7–23)
CALCIUM SERPL-MCNC: 9.2 MG/DL — SIGNIFICANT CHANGE UP (ref 8.4–10.5)
CHLORIDE SERPL-SCNC: 101 MMOL/L — SIGNIFICANT CHANGE UP (ref 98–107)
CO2 SERPL-SCNC: 25 MMOL/L — SIGNIFICANT CHANGE UP (ref 22–31)
CREAT SERPL-MCNC: 0.62 MG/DL — SIGNIFICANT CHANGE UP (ref 0.5–1.3)
EGFR: 117 ML/MIN/1.73M2 — SIGNIFICANT CHANGE UP
GLUCOSE SERPL-MCNC: 106 MG/DL — HIGH (ref 70–99)
HCT VFR BLD CALC: 24.1 % — LOW (ref 39–50)
HGB BLD-MCNC: 7.8 G/DL — LOW (ref 13–17)
MCHC RBC-ENTMCNC: 32.4 GM/DL — SIGNIFICANT CHANGE UP (ref 32–36)
MCHC RBC-ENTMCNC: 32.9 PG — SIGNIFICANT CHANGE UP (ref 27–34)
MCV RBC AUTO: 101.7 FL — HIGH (ref 80–100)
NRBC # BLD: 0 /100 WBCS — SIGNIFICANT CHANGE UP
NRBC # FLD: 0 K/UL — SIGNIFICANT CHANGE UP
PLATELET # BLD AUTO: 428 K/UL — HIGH (ref 150–400)
POTASSIUM SERPL-MCNC: 4 MMOL/L — SIGNIFICANT CHANGE UP (ref 3.5–5.3)
POTASSIUM SERPL-SCNC: 4 MMOL/L — SIGNIFICANT CHANGE UP (ref 3.5–5.3)
PROT SERPL-MCNC: 6.4 G/DL — SIGNIFICANT CHANGE UP (ref 6–8.3)
RBC # BLD: 2.37 M/UL — LOW (ref 4.2–5.8)
RBC # FLD: 14 % — SIGNIFICANT CHANGE UP (ref 10.3–14.5)
SODIUM SERPL-SCNC: 140 MMOL/L — SIGNIFICANT CHANGE UP (ref 135–145)
WBC # BLD: 6.14 K/UL — SIGNIFICANT CHANGE UP (ref 3.8–10.5)
WBC # FLD AUTO: 6.14 K/UL — SIGNIFICANT CHANGE UP (ref 3.8–10.5)

## 2022-07-13 PROCEDURE — 74177 CT ABD & PELVIS W/CONTRAST: CPT | Mod: 26

## 2022-07-13 PROCEDURE — 71260 CT THORAX DX C+: CPT | Mod: 26

## 2022-07-13 PROCEDURE — 99233 SBSQ HOSP IP/OBS HIGH 50: CPT | Mod: GC

## 2022-07-13 RX ORDER — NICOTINE POLACRILEX 2 MG
1 GUM BUCCAL
Qty: 14 | Refills: 0
Start: 2022-07-13 | End: 2022-07-26

## 2022-07-13 RX ADMIN — ENOXAPARIN SODIUM 30 MILLIGRAM(S): 100 INJECTION SUBCUTANEOUS at 17:35

## 2022-07-13 RX ADMIN — PANTOPRAZOLE SODIUM 40 MILLIGRAM(S): 20 TABLET, DELAYED RELEASE ORAL at 17:34

## 2022-07-13 RX ADMIN — Medication 1 PATCH: at 11:40

## 2022-07-13 RX ADMIN — Medication 1 PATCH: at 18:34

## 2022-07-13 RX ADMIN — Medication 100 MILLIGRAM(S): at 11:40

## 2022-07-13 RX ADMIN — Medication 1 TABLET(S): at 11:40

## 2022-07-13 RX ADMIN — PANTOPRAZOLE SODIUM 40 MILLIGRAM(S): 20 TABLET, DELAYED RELEASE ORAL at 05:46

## 2022-07-13 RX ADMIN — Medication 1 MILLIGRAM(S): at 11:40

## 2022-07-13 RX ADMIN — Medication 1 PATCH: at 06:17

## 2022-07-13 RX ADMIN — Medication 1 PATCH: at 11:30

## 2022-07-13 NOTE — PROGRESS NOTE ADULT - PROBLEM SELECTOR PLAN 4
RESOLVED  Patient presented w/ hypokalemia and hypomagnesemia on admission. Last BMP this AM normal.  - now s/p multiple doses of K and Mg  - monitor BMP, replete as needed   - Hypocalcemia: albumin 2.8 corrected to 9.2

## 2022-07-13 NOTE — PROGRESS NOTE ADULT - PROBLEM SELECTOR PLAN 3
RESOLVED  Pt was found to have Hb 7.1 from previous Hb 2 weeks ago was 12.3.  Today on repeat CBC: 7.8/24.1   RESOLVED  - initially no reports of bleeding, pt denies any bloody vomiting or stools. However pt family member reported seeing blood in toilet & sink 2 days prior to admission  - started on IV PPI BID 7/6  - cbc q12, transfuse Hb <7  - GI consulted, Anemia, macrocytic. Hgb stable. Patient denies hematochezia, melena. Given concomitant thrombocytopenia, likely in setting of chronic alcohol use  - no plan for GI intervention at this time  - H/H monitoring

## 2022-07-13 NOTE — PROGRESS NOTE ADULT - PROBLEM SELECTOR PLAN 5
DVT ppx: Hold for now in setting of anemia  - Restarted Levonox 7/12 given stable H/H  Decubiti: None  Code status: Full code  Dispo: PT recommends DC home, will ask if he needs outpt pt, ask if can go today, if so dc pt now.

## 2022-07-13 NOTE — PROGRESS NOTE ADULT - PROBLEM SELECTOR PLAN 1
Patient presented w/ AMS and reported visual hallucinations as per sister concerning for alcohol-induced psychosis vs withdrawal vs DT. CIWA score has been ranging <6 in the past 72 hours and ROJAS was discharged overnight 7/12/22.    - Does report fall recently (~2 weeks ago), however CTH negative in ED on 7/6  - Loaded w/ 600 mg phenobarb IV & started on precedex on transfer to MICU     ---- Precedex discontinued, no further agitation/restlessness and not requiring phenobarb.  - Thiamine 500mg IVPB x 3 days, now Thiamine 100mg PO QD + Folate  - mild transaminitis; now resolved  - Due to low BP, he is on LR 75ml/hr for 12 hours  - SBIRT consulted; pt uncooperative refer to SW note  - PT consult for unsteady gait but cleared for d/c

## 2022-07-13 NOTE — PROGRESS NOTE ADULT - SUBJECTIVE AND OBJECTIVE BOX
PROGRESS NOTE:   Authored by Dr. Dylan Verdugo    Patient is a 51y old  Male who presents with a chief complaint of ETOH Withdrawal (2022 07:08)      SUBJECTIVE / OVERNIGHT EVENTS:  No events overnight. Still has occasional dry coughs early morning (~4AM) but otherwise asymptomatic, no complaints.    ADDITIONAL REVIEW OF SYSTEMS:  CONSTITUTIONAL: No fever, or fatigue  EYES: No eye pain, visual disturbances, or discharge  ENMT:  No throat pain; No difficulty hearing  RESPIRATORY: + dry intermittent cough early morning; denies chills or hemoptysis; No shortness of breath  CARDIOVASCULAR: No chest pain or leg swelling  GASTROINTESTINAL:  No abdominal or epigastric pain. No nausea, vomiting, or hematemesis; No diarrhea or constipation  GENITOURINARY: No dysuria, frequency, or incontinence  NEUROLOGICAL: No headaches, loss of strength  SKIN: No itching, burning, rashes, or lesions   ENDOCRINE:  No polydipsia or polyuria  MUSCULOSKELETAL: No joint pain or swelling;     MEDICATIONS  (STANDING):  enoxaparin Injectable 30 milliGRAM(s) SubCutaneous every 24 hours  folic acid 1 milliGRAM(s) Oral daily  multivitamin 1 Tablet(s) Oral daily  nicotine -   7 mG/24Hr(s) Patch 1 Patch Transdermal daily  pantoprazole  Injectable 40 milliGRAM(s) IV Push every 12 hours  thiamine 100 milliGRAM(s) Oral daily    MEDICATIONS  (PRN):  acetaminophen     Tablet .. 650 milliGRAM(s) Oral every 6 hours PRN Temp greater or equal to 38C (100.4F)  LORazepam     Tablet 2 milliGRAM(s) Oral every 2 hours PRN Symptom-triggered 2 point increase in CIWA-Ar  LORazepam     Tablet 2 milliGRAM(s) Oral every 1 hour PRN Symptom-triggered: each CIWA -Ar score 8 or GREATER      CAPILLARY BLOOD GLUCOSE        I&O's Summary    2022 07:01  -  2022 07:00  --------------------------------------------------------  IN: 0 mL / OUT: 2400 mL / NET: -2400 mL        PHYSICAL EXAM:  Vital Signs Last 24 Hrs  T(C): 37.2 (2022 06:02), Max: 37.6 (2022 22:10)  T(F): 99 (2022 06:02), Max: 99.7 (2022 22:10)  HR: 93 (2022 06:02) (93 - 115)  BP: 91/53 (2022 06:02) (90/51 - 100/58)  BP(mean): --  RR: 17 (2022 06:02) (17 - 18)  SpO2: 100% (2022 06:02) (100% - 100%)    Parameters below as of 2022 06:02  Patient On (Oxygen Delivery Method): room air        GENERAL: NAD, lying comfortably in bed  HEAD: Atraumatic, normocephalic  EYES: EOMI b/l, conjunctiva and sclera clear  NECK: Supple   RESPIRATORY: Normal respiratory effort; lungs are clear to auscultation bilaterally  CARDIOVASCULAR: Regular rate and rhythm, normal S1 and S2, no murmur/rub/gallop; No lower extremity edema  ABDOMEN: Nontender, no rebound/guarding   MUSCULOSKELETAL:  no joint swelling or tenderness to palpation  NEURO: Non focal   PSYCH: A+O to person, place, and time; affect appropriate    LABS:                          7.8    6.14  )-----------( 428      ( 2022 05:52 )             24.1     07-13    140  |  101  |  9   ----------------------------<  106<H>  4.0   |  25  |  0.62    Ca    9.2      2022 05:52  Phos  3.8     07-12  Mg     1.60     07-12    TPro  6.4  /  Alb  3.4  /  TBili  0.5  /  DBili  x   /  AST  20  /  ALT  11  /  AlkPhos  95  07-13          Urinalysis Basic - ( 2022 16:54 )    Color: Colorless / Appearance: Clear / S.004 / pH: x  Gluc: x / Ketone: Negative  / Bili: Negative / Urobili: <2 mg/dL   Blood: x / Protein: Negative / Nitrite: Negative   Leuk Esterase: Negative / RBC: x / WBC x   Sq Epi: x / Non Sq Epi: x / Bacteria: x        Culture - Blood (collected 2022 17:00)  Source: .Blood Blood-Peripheral  Preliminary Report (2022 21:01):    No growth to date.    Culture - Blood (collected 2022 17:00)  Source: .Blood Blood-Peripheral  Preliminary Report (:01):    No growth to date.

## 2022-07-13 NOTE — PROGRESS NOTE ADULT - PROBLEM SELECTOR PLAN 2
Patient continues to spike low grade fever while being treated with Zosyn (Day 5). 7/11/22: 100.7 around noon and 99.4 on repeat at 1:30pm. Also hypotensive to 82/49 around noon.  Afebrile since 7/12/22. Tmax overnight 99.7. Patient is currently asymptomatic except for occasional cough.  BCx 7/6 NEG, 7/10 NGT  7/13  UA negative 7/11  Lactate 1.6 7/11  CXR 7/11 clear lungs  - Monitor temperature off Zosyn  - f/u C

## 2022-07-14 LAB
ALBUMIN SERPL ELPH-MCNC: 3.5 G/DL — SIGNIFICANT CHANGE UP (ref 3.3–5)
ALP SERPL-CCNC: 92 U/L — SIGNIFICANT CHANGE UP (ref 40–120)
ALT FLD-CCNC: 11 U/L — SIGNIFICANT CHANGE UP (ref 4–41)
ANION GAP SERPL CALC-SCNC: 12 MMOL/L — SIGNIFICANT CHANGE UP (ref 7–14)
AST SERPL-CCNC: 19 U/L — SIGNIFICANT CHANGE UP (ref 4–40)
BILIRUB SERPL-MCNC: 0.4 MG/DL — SIGNIFICANT CHANGE UP (ref 0.2–1.2)
BUN SERPL-MCNC: 8 MG/DL — SIGNIFICANT CHANGE UP (ref 7–23)
CALCIUM SERPL-MCNC: 9.4 MG/DL — SIGNIFICANT CHANGE UP (ref 8.4–10.5)
CHLORIDE SERPL-SCNC: 101 MMOL/L — SIGNIFICANT CHANGE UP (ref 98–107)
CO2 SERPL-SCNC: 26 MMOL/L — SIGNIFICANT CHANGE UP (ref 22–31)
CREAT SERPL-MCNC: 0.65 MG/DL — SIGNIFICANT CHANGE UP (ref 0.5–1.3)
EGFR: 116 ML/MIN/1.73M2 — SIGNIFICANT CHANGE UP
GLUCOSE SERPL-MCNC: 167 MG/DL — HIGH (ref 70–99)
GRAM STN FLD: SIGNIFICANT CHANGE UP
HCT VFR BLD CALC: 25.7 % — LOW (ref 39–50)
HGB BLD-MCNC: 8 G/DL — LOW (ref 13–17)
MAGNESIUM SERPL-MCNC: 1.4 MG/DL — LOW (ref 1.6–2.6)
MCHC RBC-ENTMCNC: 31.1 GM/DL — LOW (ref 32–36)
MCHC RBC-ENTMCNC: 32.5 PG — SIGNIFICANT CHANGE UP (ref 27–34)
MCV RBC AUTO: 104.5 FL — HIGH (ref 80–100)
NIGHT BLUE STAIN TISS: SIGNIFICANT CHANGE UP
NIGHT BLUE STAIN TISS: SIGNIFICANT CHANGE UP
NRBC # BLD: 0 /100 WBCS — SIGNIFICANT CHANGE UP
NRBC # FLD: 0 K/UL — SIGNIFICANT CHANGE UP
PHOSPHATE SERPL-MCNC: 3.8 MG/DL — SIGNIFICANT CHANGE UP (ref 2.5–4.5)
PLATELET # BLD AUTO: 495 K/UL — HIGH (ref 150–400)
POTASSIUM SERPL-MCNC: 4.5 MMOL/L — SIGNIFICANT CHANGE UP (ref 3.5–5.3)
POTASSIUM SERPL-SCNC: 4.5 MMOL/L — SIGNIFICANT CHANGE UP (ref 3.5–5.3)
PROT SERPL-MCNC: 6.7 G/DL — SIGNIFICANT CHANGE UP (ref 6–8.3)
RBC # BLD: 2.46 M/UL — LOW (ref 4.2–5.8)
RBC # FLD: 14.1 % — SIGNIFICANT CHANGE UP (ref 10.3–14.5)
SODIUM SERPL-SCNC: 139 MMOL/L — SIGNIFICANT CHANGE UP (ref 135–145)
SPECIMEN SOURCE: SIGNIFICANT CHANGE UP
WBC # BLD: 6.4 K/UL — SIGNIFICANT CHANGE UP (ref 3.8–10.5)
WBC # FLD AUTO: 6.4 K/UL — SIGNIFICANT CHANGE UP (ref 3.8–10.5)

## 2022-07-14 PROCEDURE — 99233 SBSQ HOSP IP/OBS HIGH 50: CPT | Mod: GC

## 2022-07-14 PROCEDURE — 99254 IP/OBS CNSLTJ NEW/EST MOD 60: CPT | Mod: GC

## 2022-07-14 RX ORDER — MAGNESIUM SULFATE 500 MG/ML
1 VIAL (ML) INJECTION ONCE
Refills: 0 | Status: COMPLETED | OUTPATIENT
Start: 2022-07-14 | End: 2022-07-14

## 2022-07-14 RX ADMIN — PANTOPRAZOLE SODIUM 40 MILLIGRAM(S): 20 TABLET, DELAYED RELEASE ORAL at 06:03

## 2022-07-14 RX ADMIN — Medication 1 PATCH: at 11:39

## 2022-07-14 RX ADMIN — Medication 1 PATCH: at 19:07

## 2022-07-14 RX ADMIN — Medication 1 PATCH: at 06:59

## 2022-07-14 RX ADMIN — Medication 100 GRAM(S): at 11:40

## 2022-07-14 RX ADMIN — Medication 1 TABLET(S): at 11:39

## 2022-07-14 RX ADMIN — Medication 1 MILLIGRAM(S): at 11:39

## 2022-07-14 RX ADMIN — Medication 1 PATCH: at 12:01

## 2022-07-14 RX ADMIN — Medication 100 MILLIGRAM(S): at 11:39

## 2022-07-14 RX ADMIN — ENOXAPARIN SODIUM 30 MILLIGRAM(S): 100 INJECTION SUBCUTANEOUS at 17:17

## 2022-07-14 RX ADMIN — PANTOPRAZOLE SODIUM 40 MILLIGRAM(S): 20 TABLET, DELAYED RELEASE ORAL at 17:17

## 2022-07-14 NOTE — PROGRESS NOTE ADULT - PROBLEM SELECTOR PLAN 2
Patient continues to spike low grade fever while being treated with Zosyn (Day 5). 7/11/22: 100.7 around noon and 99.4 on repeat at 1:30pm. Also hypotensive to 82/49 around noon.  Afebrile since 7/12/22. Tmax overnight 99.7. Patient is currently asymptomatic except for occasional cough.  BCx 7/6 NEG, 7/10 NGT  7/13  UA negative 7/11  Lactate 1.6 7/11  CXR 7/11 clear lungs  - Monitor temperature off Zosyn  - f/u C Patient continues to spike low grade fever while being treated with Zosyn (Day 5). 7/11/22: 100.7 around noon and 99.4 on repeat at 1:30pm. Also hypotensive to 82/49 around noon.  Afebrile since 7/12/22. Tmax overnight 99.7. Patient is currently asymptomatic except for occasional cough.  BCx 7/6 NEG, 7/10 NGT  7/13  UA negative 7/11  Lactate 1.6 7/11  CXR 7/11 clear lungs    7/13: CT Chest revealed 3cm necrotic central lymph node concerning for TB. Patient moved to isolation  7/14: afebrile. ID consulted, plan for AFB x 3, sputum culture, Histo urine Ag, Coccidioides Ab, Blasto Ab  - f/u AFB  - f/u sputum culture  - f/u Histo urine Ag  - f/u Coccidiodes Ab  - f/u Blasto Ab      - Monitor temperature off Zosyn  - f/u C Patient continues to spike low grade fever while being treated with Zosyn (Day 5). 7/11/22: 100.7 around noon and 99.4 on repeat at 1:30pm. Also hypotensive to 82/49 around noon.  Afebrile since 7/12/22. Tmax overnight 99.7. Patient is currently asymptomatic except for occasional cough.  BCx 7/6 NEG, 7/10 NGT  7/13  UA negative 7/11  Lactate 1.6 7/11  CXR 7/11 clear lungs    7/13: CT Chest revealed 3cm necrotic central lymph node concerning for TB. Patient moved to isolation  7/14: afebrile. ID consulted, plan for AFB x 3, sputum culture, Histo urine Ag, Coccidioides Ab, Blasto Ab  - f/u AFB  - f/u sputum culture  - f/u Histo urine Ag  - f/u Coccidiodes Ab  - f/u Blasto Ab  - Monitor temperature off Zosyn

## 2022-07-14 NOTE — CONSULT NOTE ADULT - ASSESSMENT
IMPRESSION:  Necrotic lymph node  Hx pulmonary TB s/p treatment in 2018  Fever  Alcohol abuse    RECOMMENDATIONS:  - Maintain airborne isolation for now  - Monitor off antibiotics for now  - Obtain AFB sputum culture x 3 q8hrs, at least one of them has to be before breakfast  - Obtain sputum culture  - Check urine histo Ag, coccidioides Ab, Blasto Ab  - IR biopsy of necrotic lymph node  - Trend WBC, fever curve, transaminases, creatinine daily  - Will continue to follow      * THIS IS AN INCOMPLETE NOTE. FINAL RECOMMENDATION WILL BE UPDATED AFTER DISCUSSING WITH ATTENDING *   50 yo M w/ PMH alcohol use hx of TB s/p treament, admitted from the ED 7/6 for AMS    ID is consulted for necrotic lymph node  Initially presented with confusion and fever; no leukocytosis  Confusion is better after CIWA protocol but fever persisted  BCx 7/6, 7/7, 7/11 NGTD    ESR//35.2  HIV and syphilis negative  CXR no PNA  CT chest showed 3cm prevascular necrotic lymph node, YOLANDA scaring, no consolidation    Patient reported culture confirmed TB with direct observed treatment with [facility] for 11 months  QuantiFeron positive 2018, AFB sputum negative 3/2018  He was hospitalized for 7 weeks in [facility] and followed up in [facility]    At this time it is unclear what the necrotic lymph node represent  Fever could be secondary to alcohol withdrawal, otherwise no signs of active bacterial infection  Could this be chronic granulomatous LN from previous TB infection? Endemic fungi infection from [place]? Malignancy from smoking?   Will need to compare his CT chest with his prior CT chest from [facility]  Meanwhile it is reasonable to rule out active TB again and check serologies to rule out endemic fungal infection  Consider biopsy later if result is inconclusive      IMPRESSION:  Necrotic lymph node  Hx pulmonary TB s/p treatment in 2018  Fever  Alcohol abuse    RECOMMENDATIONS:  - Maintain airborne isolation for now  - Monitor off antibiotics for now  - Obtain AFB sputum culture x 3 q8hrs, at least one of them has to be before breakfast  - Obtain sputum culture  - Check urine histo Ag, coccidioides Ab, Blasto Ab  - Reach out to [facility] for previous CT chest in 2018 to compare the appearance of lymph node  - Trend WBC, fever curve, transaminases, creatinine daily  - Will continue to follow      Patient is seen and examined with attending and case is discussed with primary team    Gricel Mera D.O.  PGY-5 Infectious Diseases Fellow  Please contact me via page or text through [employer] Teams  If after 5PM or on weekends, please call [phone]

## 2022-07-14 NOTE — PROGRESS NOTE ADULT - SUBJECTIVE AND OBJECTIVE BOX
PROGRESS NOTE:   Authored by Dr. Lauren Montague    Patient is a 51y old  Male who presents with a chief complaint of ETOH Withdrawal (13 Jul 2022 09:09)      SUBJECTIVE / OVERNIGHT EVENTS:    ADDITIONAL REVIEW OF SYSTEMS:    MEDICATIONS  (STANDING):  enoxaparin Injectable 30 milliGRAM(s) SubCutaneous every 24 hours  folic acid 1 milliGRAM(s) Oral daily  multivitamin 1 Tablet(s) Oral daily  nicotine -   7 mG/24Hr(s) Patch 1 Patch Transdermal daily  pantoprazole  Injectable 40 milliGRAM(s) IV Push every 12 hours  thiamine 100 milliGRAM(s) Oral daily    MEDICATIONS  (PRN):  acetaminophen     Tablet .. 650 milliGRAM(s) Oral every 6 hours PRN Temp greater or equal to 38C (100.4F)  LORazepam     Tablet 2 milliGRAM(s) Oral every 2 hours PRN Symptom-triggered 2 point increase in CIWA-Ar  LORazepam     Tablet 2 milliGRAM(s) Oral every 1 hour PRN Symptom-triggered: each CIWA -Ar score 8 or GREATER      CAPILLARY BLOOD GLUCOSE        I&O's Summary    13 Jul 2022 07:01  -  14 Jul 2022 07:00  --------------------------------------------------------  IN: 0 mL / OUT: 200 mL / NET: -200 mL        PHYSICAL EXAM:  Vital Signs Last 24 Hrs  T(C): 37.6 (14 Jul 2022 06:02), Max: 37.8 (13 Jul 2022 21:43)  T(F): 99.7 (14 Jul 2022 06:02), Max: 100 (13 Jul 2022 21:43)  HR: 94 (14 Jul 2022 06:02) (87 - 95)  BP: 97/67 (14 Jul 2022 06:02) (97/67 - 105/64)  BP(mean): --  RR: 16 (14 Jul 2022 06:02) (16 - 18)  SpO2: 100% (14 Jul 2022 06:02) (99% - 100%)    Parameters below as of 14 Jul 2022 06:02  Patient On (Oxygen Delivery Method): room air        GENERAL: NAD, lying comfortably in bed  HEAD: Atraumatic, normocephalic  EYES: EOMI b/l PERRLA b/l, conjunctiva and sclera clear  NECK: Supple, No JVD, No LAD  RESPIRATORY: Normal respiratory effort; lungs are clear to auscultation bilaterally  CARDIOVASCULAR: Regular rate and rhythm, normal S1 and S2, no murmur/rub/gallop; No lower extremity edema  ABDOMEN: Nontender, normoactive bowel sounds, no rebound/guarding; No hepatosplenomegaly  MUSCULOSKELETAL: no clubbing or cyanosis of digits; no joint swelling or tenderness to palpation  NEURO: Non focal   PSYCH: A+O to person, place, and time; affect appropriate    LABS:                          7.8    6.14  )-----------( 428      ( 13 Jul 2022 05:52 )             24.1     07-13    140  |  101  |  9   ----------------------------<  106<H>  4.0   |  25  |  0.62    Ca    9.2      13 Jul 2022 05:52    TPro  6.4  /  Alb  3.4  /  TBili  0.5  /  DBili  x   /  AST  20  /  ALT  11  /  AlkPhos  95  07-13              Culture - Blood (collected 11 Jul 2022 17:00)  Source: .Blood Blood-Peripheral  Preliminary Report (12 Jul 2022 21:01):    No growth to date.    Culture - Blood (collected 11 Jul 2022 17:00)  Source: .Blood Blood-Peripheral  Preliminary Report (12 Jul 2022 21:01):    No growth to date.       PROGRESS NOTE:   Authored by Dr. Dylan Verdugo    Patient is a 51y old  Male who presents with a chief complaint of ETOH Withdrawal (13 Jul 2022 09:09)      SUBJECTIVE / OVERNIGHT EVENTS: No events overnight. Patient endorses continued intermittent dry cough.     ADDITIONAL REVIEW OF SYSTEMS:  CONSTITUTIONAL: No fever or fatigue  ENMT:  No difficulty hearing  RESPIRATORY: +occasional dry cough; No wheezing, chills or hemoptysis; No shortness of breath  CARDIOVASCULAR: No chest pain or leg swelling  GASTROINTESTINAL: No abdominal or epigastric pain. No nausea, vomiting, or hematemesis   GENITOURINARY: No incontinence  NEUROLOGICAL: No headaches   SKIN: No itching  ENDOCRINE: No heat or cold intolerance; No polydipsia or polyuria  HEME/LYMPH: No easy bruising, or bleeding gums  ALLERGY AND IMMUNOLOGIC: No hives or eczema    MEDICATIONS  (STANDING):  enoxaparin Injectable 30 milliGRAM(s) SubCutaneous every 24 hours  folic acid 1 milliGRAM(s) Oral daily  multivitamin 1 Tablet(s) Oral daily  nicotine -   7 mG/24Hr(s) Patch 1 Patch Transdermal daily  pantoprazole  Injectable 40 milliGRAM(s) IV Push every 12 hours  thiamine 100 milliGRAM(s) Oral daily    MEDICATIONS  (PRN):  acetaminophen     Tablet .. 650 milliGRAM(s) Oral every 6 hours PRN Temp greater or equal to 38C (100.4F)  LORazepam     Tablet 2 milliGRAM(s) Oral every 2 hours PRN Symptom-triggered 2 point increase in CIWA-Ar  LORazepam     Tablet 2 milliGRAM(s) Oral every 1 hour PRN Symptom-triggered: each CIWA -Ar score 8 or GREATER      CAPILLARY BLOOD GLUCOSE        I&O's Summary    13 Jul 2022 07:01  -  14 Jul 2022 07:00  --------------------------------------------------------  IN: 0 mL / OUT: 200 mL / NET: -200 mL        PHYSICAL EXAM:  Vital Signs Last 24 Hrs  T(C): 37.6 (14 Jul 2022 06:02), Max: 37.8 (13 Jul 2022 21:43)  T(F): 99.7 (14 Jul 2022 06:02), Max: 100 (13 Jul 2022 21:43)  HR: 94 (14 Jul 2022 06:02) (87 - 95)  BP: 97/67 (14 Jul 2022 06:02) (97/67 - 105/64)  BP(mean): --  RR: 16 (14 Jul 2022 06:02) (16 - 18)  SpO2: 100% (14 Jul 2022 06:02) (99% - 100%)    Parameters below as of 14 Jul 2022 06:02  Patient On (Oxygen Delivery Method): room air    GENERAL: NAD, lying comfortably in bed  HEAD: Atraumatic, normocephalic  EYES: EOMI b/l  conjunctiva and sclera clear  NECK: Supple   RESPIRATORY: Normal respiratory effort   CARDIOVASCULAR:  No lower extremity edema  ABDOMEN: Nontender,no rebound/guarding   MUSCULOSKELETAL:  no joint swelling    NEURO: Non focal   PSYCH: A+O to person, place, and time; affect appropriate    LABS:                          8.0    6.40  )-----------( 495      ( 14 Jul 2022 07:38 )             25.7     07-14    139  |  101  |  8   ----------------------------<  167<H>  4.5   |  26  |  0.65    Ca    9.4      14 Jul 2022 07:38  Phos  3.8     07-14  Mg     1.40     07-14    TPro  6.7  /  Alb  3.5  /  TBili  0.4  /  DBili  x   /  AST  19  /  ALT  11  /  AlkPhos  92  07-14

## 2022-07-14 NOTE — CONSULT NOTE ADULT - SUBJECTIVE AND OBJECTIVE BOX
INFECTIOUS DISEASE CONSULT NOTE    Patient is a 51y old  Male who presents with a chief complaint of ETOH Withdrawal (14 Jul 2022 07:45)    HPI:  Mr. Syeda Johnson is a 50 yo M w/ PMH alcohol use, admitted from the ED 7/6 for AMS. Collateral info from chart and pt's sister Elzbieta ([phone]).    Pt had 1 day of AMS. Confusion, visual hallucinations, anxiety. Pt called his sister claiming there was a person in his home, but no one was there. Pt also reported seeing mice which were not there. No reports of increased diaphoresis, fever, palpitations, chest pain, SOB.     Pt called 911 due to perceiving a person in his home, and was brought to the ED. Observed to be disoriented, restless, tremulous. There he was normotensive & afebrile w/ mild tachycardia to 101. UTox negative for alcohol. CT head normal. Received 2 mg ativan x2 in the ED, continued to be restless & confused. Found to have Hb 7.5 (repeat 7.8), no reported rectal bleeding or bloody vomiting. Last Hb 6/23 was 12.3.    Of note, pt was previously a  working night shifts. On 6/23, while at work, he suffered a fall and went to the ED, discharged home. After being discharged home, he received notice that he was being fired for being intoxicated at work. Per sister, he was motivated to stop drinking after losing his job and had his last drink 5 days ago.     Per sister, pt has a "drinking problem" and drinks beer & vodka (unknown # of drinks) to help him fall asleep during the day. She states that he has drank like this for ~1 year, prior to 1 year go he had lower alcohol intake.    No other known medical hx. He does not take any medications nor any substances other than alcohol & tobacco (smokes cigarettes, cigars). His sister states that he was hospitalized at [facility] ~2 years ago for "tuberculosis" but does not recall any details.   (06 Jul 2022 09:43)       REVIEW OF SYSTEMS:  CONSTITUTIONAL: No fever or chills  HEENT: No sore throat  RESPIRATORY: No cough, no shortness of breath  CARDIOVASCULAR: No chest pain or palpitations  GASTROINTESTINAL: No abdominal or epigastric pain  GENITOURINARY: No dysuria  NEUROLOGICAL: No headache/dizziness  MSK: No joint pain, erythema, or swelling; no back pain  SKIN: No itching, rashes  All other ROS negative except noted above    Prior hospital charts reviewed [Yes]  Primary team notes reviewed [Yes]  Other consultant notes reviewed [Yes]    PAST MEDICAL & SURGICAL HISTORY:      SOCIAL HISTORY:  - Born in [place], [place] in 19[place]  - Currently working as / Retired  - Lives with _____; no pets  - No recent travel  - Denies tobacco use  - Denies alcohol use  - Denies illicit drug use  - Currently sexually active, has one male/female sexual partner    FAMILY HISTORY:      Allergies:  No Known Allergies      ANTIMICROBIALS:      ANTIMICROBIALS (past 90 days):  MEDICATIONS  (STANDING):  piperacillin/tazobactam IVPB.   200 mL/Hr IV Intermittent (07-07-22 @ 18:11)    piperacillin/tazobactam IVPB..   25 mL/Hr IV Intermittent (07-11-22 @ 10:36)   25 mL/Hr IV Intermittent (07-11-22 @ 02:11)   25 mL/Hr IV Intermittent (07-10-22 @ 19:30)   25 mL/Hr IV Intermittent (07-10-22 @ 09:46)   25 mL/Hr IV Intermittent (07-10-22 @ 01:39)   25 mL/Hr IV Intermittent (07-09-22 @ 17:52)   25 mL/Hr IV Intermittent (07-09-22 @ 09:58)   25 mL/Hr IV Intermittent (07-09-22 @ 02:44)   25 mL/Hr IV Intermittent (07-08-22 @ 17:56)   25 mL/Hr IV Intermittent (07-08-22 @ 10:34)   25 mL/Hr IV Intermittent (07-08-22 @ 02:30)    vancomycin  IVPB   250 mL/Hr IV Intermittent (07-08-22 @ 02:30)        OTHER MEDS:   MEDICATIONS  (STANDING):  acetaminophen     Tablet .. 650 every 6 hours PRN  enoxaparin Injectable 30 every 24 hours  LORazepam     Tablet 2 every 1 hour PRN  LORazepam     Tablet 2 every 2 hours PRN  pantoprazole  Injectable 40 every 12 hours      VITALS:  Vital Signs Last 24 Hrs  T(F): 99.7 (07-14-22 @ 06:02), Max: 100.7 (07-11-22 @ 12:00)    Vital Signs Last 24 Hrs  HR: 94 (07-14-22 @ 06:02) (87 - 95)  BP: 97/67 (07-14-22 @ 06:02) (97/67 - 105/64)  RR: 16 (07-14-22 @ 06:02)  SpO2: 100% (07-14-22 @ 06:02) (99% - 100%)  Wt(kg): --    EXAM:  GENERAL: NAD, lying in bed comfortably  HEAD: No head lesions  EYES: Conjunctiva pink and cornea white  ENT: Normal external ears and nose, no discharges; moist mucous membranes  NECK: Supple, nontender to palpation; no JVD  CHEST/LUNG: Clear to auscultation bilaterally  HEART: S1 S2  ABDOMEN: Soft, nontender, nondistended; normoactive bowel sounds  EXTREMITIES: No clubbing, cyanosis, or petal edema  NERVOUS SYSTEM: Alert and oriented to person, time, place and situation, speech clear. No focal deficits   MSK: No joint erythema, swelling or pain  SKIN: No rashes or lesions, no superficial thrombophlebitis    Labs:                        8.0    6.40  )-----------( 495      ( 14 Jul 2022 07:38 )             25.7     07-14    139  |  101  |  8   ----------------------------<  167<H>  4.5   |  26  |  0.65    Ca    9.4      14 Jul 2022 07:38  Phos  3.8     07-14  Mg     1.40     07-14    TPro  6.7  /  Alb  3.5  /  TBili  0.4  /  DBili  x   /  AST  19  /  ALT  11  /  AlkPhos  92  07-14      WBC Trend:  WBC Count: 6.40 (07-14-22 @ 07:38)  WBC Count: 6.14 (07-13-22 @ 05:52)  WBC Count: 7.94 (07-12-22 @ 05:46)  WBC Count: 5.81 (07-10-22 @ 06:00)      Auto Neutrophil #: 2.43 K/uL (07-10-22 @ 06:00)  Auto Neutrophil #: 3.05 K/uL (07-08-22 @ 06:00)  Auto Neutrophil #: 3.62 K/uL (07-07-22 @ 16:12)  Auto Neutrophil #: 4.38 K/uL (07-07-22 @ 04:25)  Auto Neutrophil #: 3.99 K/uL (07-06-22 @ 20:59)      Creatine Trend:  Creatinine, Serum: 0.65 (07-14)  Creatinine, Serum: 0.62 (07-13)  Creatinine, Serum: 0.60 (07-12)  Creatinine, Serum: 0.66 (07-10)      Liver Biochemical Testing Trend:  Alanine Aminotransferase (ALT/SGPT): 11 (07-14)  Alanine Aminotransferase (ALT/SGPT): 11 (07-13)  Alanine Aminotransferase (ALT/SGPT): 11 (07-12)  Alanine Aminotransferase (ALT/SGPT): 11 (07-10)  Alanine Aminotransferase (ALT/SGPT): 17 (07-08)  Aspartate Aminotransferase (AST/SGOT): 19 (07-14-22 @ 07:38)  Aspartate Aminotransferase (AST/SGOT): 20 (07-13-22 @ 05:52)  Aspartate Aminotransferase (AST/SGOT): 23 (07-12-22 @ 05:46)  Aspartate Aminotransferase (AST/SGOT): 22 (07-10-22 @ 06:00)  Aspartate Aminotransferase (AST/SGOT): 28 (07-08-22 @ 06:00)  Bilirubin Total, Serum: 0.4 (07-14)  Bilirubin Total, Serum: 0.5 (07-13)  Bilirubin Total, Serum: 0.6 (07-12)  Bilirubin Total, Serum: 0.8 (07-10)  Bilirubin Total, Serum: 0.7 (07-08)      Trend LDH  07-12-22 @ 05:46  237<H>              MICROBIOLOGY:        Culture - Acid Fast - Sputum w/Smear (collected 13 Jul 2022 18:49)  Source: .Sputum Sputum    Culture - Blood (collected 11 Jul 2022 17:00)  Source: .Blood Blood-Peripheral  Preliminary Report:    No growth to date.    Culture - Blood (collected 11 Jul 2022 17:00)  Source: .Blood Blood-Peripheral  Preliminary Report:    No growth to date.    Culture - Blood (collected 10 Jul 2022 05:55)  Source: .Blood Blood-Peripheral  Preliminary Report:    No growth to date.    Culture - Blood (collected 10 Jul 2022 05:40)  Source: .Blood Blood-Peripheral  Preliminary Report:    No growth to date.    Culture - Blood (collected 06 Jul 2022 20:59)  Source: .Blood Blood-Peripheral  Final Report:    No Growth Final    Culture - Blood (collected 06 Jul 2022 20:59)  Source: .Blood Blood-Peripheral  Final Report:    No Growth Final      HIV-1/2 Combo Result: Nonreact (07-06-22 @ 06:54)      Rapid RVP Result: NotDetec (07-12 @ 11:20)    COVID-19 PCR: NotDetec (07-10-22 @ 10:00)  COVID-19 PCR: NotDetec (07-06-22 @ 03:27)          C-Reactive Protein, Serum: 35.2 (07-12)    Ferritin, Serum: 826 (07-09)      Lactate Dehydrogenase, Serum: 237 (07-12)        Blood Gas Venous - Lactate: 1.6 (07-11 @ 16:55)    A1C with Estimated Average Glucose Result: 4.6 % (07-06-22 @ 03:27)      RADIOLOGY:  imaging below personally reviewed    < from: CT Abdomen and Pelvis w/ IV Cont (07.13.22 @ 13:07) >  IMPRESSION:  Central patches of groundglass opacities in the right lung, concerning   for pneumonia.    Prevascular 3.0 cm centrally low attenuating structure, likely represents   necrotic lymph node.    Subtle hepatic nodularity and perigastric/splenic varices, raises concern   for cirrhosis.    < end of copied text >   INFECTIOUS DISEASE CONSULT NOTE    Patient is a 51y old  Male who presents with a chief complaint of ETOH Withdrawal (14 Jul 2022 07:45)    HPI:  Mr. Syeda Johnson is a 50 yo M w/ PMH alcohol use, admitted from the ED 7/6 for AMS. Collateral info from chart and pt's sister Elzbieta ([phone]).    Pt had 1 day of AMS. Confusion, visual hallucinations, anxiety. Pt called his sister claiming there was a person in his home, but no one was there. Pt also reported seeing mice which were not there. No reports of increased diaphoresis, fever, palpitations, chest pain, SOB.     Pt called 911 due to perceiving a person in his home, and was brought to the ED. Observed to be disoriented, restless, tremulous. There he was normotensive & afebrile w/ mild tachycardia to 101. UTox negative for alcohol. CT head normal. Received 2 mg ativan x2 in the ED, continued to be restless & confused. Found to have Hb 7.5 (repeat 7.8), no reported rectal bleeding or bloody vomiting. Last Hb 6/23 was 12.3.    Of note, pt was previously a  working night shifts. On 6/23, while at work, he suffered a fall and went to the ED, discharged home. After being discharged home, he received notice that he was being fired for being intoxicated at work. Per sister, he was motivated to stop drinking after losing his job and had his last drink 5 days ago.     Per sister, pt has a "drinking problem" and drinks beer & vodka (unknown # of drinks) to help him fall asleep during the day. She states that he has drank like this for ~1 year, prior to 1 year go he had lower alcohol intake.    No other known medical hx. He does not take any medications nor any substances other than alcohol & tobacco (smokes cigarettes, cigars). His sister states that he was hospitalized at [facility] ~2 years ago for "tuberculosis" but does not recall any details.   (06 Jul 2022 09:43)       REVIEW OF SYSTEMS:  CONSTITUTIONAL: No fever or chills; + weight loss  HEENT: No sore throat  RESPIRATORY: + cough, no shortness of breath  CARDIOVASCULAR: No chest pain or palpitations  GASTROINTESTINAL: No abdominal or epigastric pain  GENITOURINARY: No dysuria  NEUROLOGICAL: No headache/dizziness  MSK: No joint pain, erythema, or swelling; no back pain  SKIN: No itching, rashes  All other ROS negative except noted above    PAST MEDICAL & SURGICAL HISTORY:  History of TB s/p treatment    Denies any surgical history    SOCIAL HISTORY:  - Born in [facility], migrated to  in 2014  - Currently working as Security  - Lives with alone; no pets  - No recent travel since he arrived to [place]  - Active tobacco use for 15 yrs  - Active alcohol use for 15 yrs  - Denies illicit drug use    FAMILY HISTORY:  No family history of TB    Allergies:  No Known Allergies      ANTIMICROBIALS:      ANTIMICROBIALS (past 90 days):  MEDICATIONS  (STANDING):  piperacillin/tazobactam IVPB.   200 mL/Hr IV Intermittent (07-07-22 @ 18:11)    piperacillin/tazobactam IVPB..   25 mL/Hr IV Intermittent (07-11-22 @ 10:36)   25 mL/Hr IV Intermittent (07-11-22 @ 02:11)   25 mL/Hr IV Intermittent (07-10-22 @ 19:30)   25 mL/Hr IV Intermittent (07-10-22 @ 09:46)   25 mL/Hr IV Intermittent (07-10-22 @ 01:39)   25 mL/Hr IV Intermittent (07-09-22 @ 17:52)   25 mL/Hr IV Intermittent (07-09-22 @ 09:58)   25 mL/Hr IV Intermittent (07-09-22 @ 02:44)   25 mL/Hr IV Intermittent (07-08-22 @ 17:56)   25 mL/Hr IV Intermittent (07-08-22 @ 10:34)   25 mL/Hr IV Intermittent (07-08-22 @ 02:30)    vancomycin  IVPB   250 mL/Hr IV Intermittent (07-08-22 @ 02:30)        OTHER MEDS:   MEDICATIONS  (STANDING):  acetaminophen     Tablet .. 650 every 6 hours PRN  enoxaparin Injectable 30 every 24 hours  LORazepam     Tablet 2 every 1 hour PRN  LORazepam     Tablet 2 every 2 hours PRN  pantoprazole  Injectable 40 every 12 hours      VITALS:  Vital Signs Last 24 Hrs  T(F): 99.7 (07-14-22 @ 06:02), Max: 100.7 (07-11-22 @ 12:00)    Vital Signs Last 24 Hrs  HR: 94 (07-14-22 @ 06:02) (87 - 95)  BP: 97/67 (07-14-22 @ 06:02) (97/67 - 105/64)  RR: 16 (07-14-22 @ 06:02)  SpO2: 100% (07-14-22 @ 06:02) (99% - 100%)  Wt(kg): --    EXAM:  GENERAL: NAD, lying in bed comfortably  HEAD: No head lesions  EYES: Conjunctiva pink and cornea white  ENT: Normal external ears and nose, no discharges; moist mucous membranes  NECK: Supple, nontender to palpation; no JVD  CHEST/LUNG: Clear to auscultation bilaterally  HEART: S1 S2  ABDOMEN: Soft, nontender, nondistended; normoactive bowel sounds  EXTREMITIES: No clubbing, cyanosis, or petal edema  NERVOUS SYSTEM: Alert and oriented to person, time, place and situation, speech clear. No focal deficits   MSK: No joint erythema, swelling or pain  SKIN: No rashes or lesions, no superficial thrombophlebitis    Labs:                        8.0    6.40  )-----------( 495      ( 14 Jul 2022 07:38 )             25.7     07-14    139  |  101  |  8   ----------------------------<  167<H>  4.5   |  26  |  0.65    Ca    9.4      14 Jul 2022 07:38  Phos  3.8     07-14  Mg     1.40     07-14    TPro  6.7  /  Alb  3.5  /  TBili  0.4  /  DBili  x   /  AST  19  /  ALT  11  /  AlkPhos  92  07-14      WBC Trend:  WBC Count: 6.40 (07-14-22 @ 07:38)  WBC Count: 6.14 (07-13-22 @ 05:52)  WBC Count: 7.94 (07-12-22 @ 05:46)  WBC Count: 5.81 (07-10-22 @ 06:00)      Auto Neutrophil #: 2.43 K/uL (07-10-22 @ 06:00)  Auto Neutrophil #: 3.05 K/uL (07-08-22 @ 06:00)  Auto Neutrophil #: 3.62 K/uL (07-07-22 @ 16:12)  Auto Neutrophil #: 4.38 K/uL (07-07-22 @ 04:25)  Auto Neutrophil #: 3.99 K/uL (07-06-22 @ 20:59)      Creatine Trend:  Creatinine, Serum: 0.65 (07-14)  Creatinine, Serum: 0.62 (07-13)  Creatinine, Serum: 0.60 (07-12)  Creatinine, Serum: 0.66 (07-10)      Liver Biochemical Testing Trend:  Alanine Aminotransferase (ALT/SGPT): 11 (07-14)  Alanine Aminotransferase (ALT/SGPT): 11 (07-13)  Alanine Aminotransferase (ALT/SGPT): 11 (07-12)  Alanine Aminotransferase (ALT/SGPT): 11 (07-10)  Alanine Aminotransferase (ALT/SGPT): 17 (07-08)  Aspartate Aminotransferase (AST/SGOT): 19 (07-14-22 @ 07:38)  Aspartate Aminotransferase (AST/SGOT): 20 (07-13-22 @ 05:52)  Aspartate Aminotransferase (AST/SGOT): 23 (07-12-22 @ 05:46)  Aspartate Aminotransferase (AST/SGOT): 22 (07-10-22 @ 06:00)  Aspartate Aminotransferase (AST/SGOT): 28 (07-08-22 @ 06:00)  Bilirubin Total, Serum: 0.4 (07-14)  Bilirubin Total, Serum: 0.5 (07-13)  Bilirubin Total, Serum: 0.6 (07-12)  Bilirubin Total, Serum: 0.8 (07-10)  Bilirubin Total, Serum: 0.7 (07-08)      Trend LDH  07-12-22 @ 05:46  237<H>              MICROBIOLOGY:        Culture - Acid Fast - Sputum w/Smear (collected 13 Jul 2022 18:49)  Source: .Sputum Sputum    Culture - Blood (collected 11 Jul 2022 17:00)  Source: .Blood Blood-Peripheral  Preliminary Report:    No growth to date.    Culture - Blood (collected 11 Jul 2022 17:00)  Source: .Blood Blood-Peripheral  Preliminary Report:    No growth to date.    Culture - Blood (collected 10 Jul 2022 05:55)  Source: .Blood Blood-Peripheral  Preliminary Report:    No growth to date.    Culture - Blood (collected 10 Jul 2022 05:40)  Source: .Blood Blood-Peripheral  Preliminary Report:    No growth to date.    Culture - Blood (collected 06 Jul 2022 20:59)  Source: .Blood Blood-Peripheral  Final Report:    No Growth Final    Culture - Blood (collected 06 Jul 2022 20:59)  Source: .Blood Blood-Peripheral  Final Report:    No Growth Final      HIV-1/2 Combo Result: Nonreact (07-06-22 @ 06:54)      Rapid RVP Result: NotDetec (07-12 @ 11:20)    COVID-19 PCR: NotDetec (07-10-22 @ 10:00)  COVID-19 PCR: NotDetec (07-06-22 @ 03:27)          C-Reactive Protein, Serum: 35.2 (07-12)    Ferritin, Serum: 826 (07-09)      Lactate Dehydrogenase, Serum: 237 (07-12)        Blood Gas Venous - Lactate: 1.6 (07-11 @ 16:55)    A1C with Estimated Average Glucose Result: 4.6 % (07-06-22 @ 03:27)      RADIOLOGY:  imaging below personally reviewed    < from: CT Abdomen and Pelvis w/ IV Cont (07.13.22 @ 13:07) >  IMPRESSION:  Central patches of groundglass opacities in the right lung, concerning   for pneumonia.    Prevascular 3.0 cm centrally low attenuating structure, likely represents   necrotic lymph node.    Subtle hepatic nodularity and perigastric/splenic varices, raises concern   for cirrhosis.    < end of copied text >

## 2022-07-15 DIAGNOSIS — F10.239 ALCOHOL DEPENDENCE WITH WITHDRAWAL, UNSPECIFIED: ICD-10-CM

## 2022-07-15 LAB
ALBUMIN SERPL ELPH-MCNC: 3.4 G/DL — SIGNIFICANT CHANGE UP (ref 3.3–5)
ALP SERPL-CCNC: 93 U/L — SIGNIFICANT CHANGE UP (ref 40–120)
ALT FLD-CCNC: 13 U/L — SIGNIFICANT CHANGE UP (ref 4–41)
ANION GAP SERPL CALC-SCNC: 13 MMOL/L — SIGNIFICANT CHANGE UP (ref 7–14)
AST SERPL-CCNC: 23 U/L — SIGNIFICANT CHANGE UP (ref 4–40)
BILIRUB SERPL-MCNC: 0.4 MG/DL — SIGNIFICANT CHANGE UP (ref 0.2–1.2)
BUN SERPL-MCNC: 12 MG/DL — SIGNIFICANT CHANGE UP (ref 7–23)
CALCIUM SERPL-MCNC: 9.4 MG/DL — SIGNIFICANT CHANGE UP (ref 8.4–10.5)
CHLORIDE SERPL-SCNC: 99 MMOL/L — SIGNIFICANT CHANGE UP (ref 98–107)
CO2 SERPL-SCNC: 25 MMOL/L — SIGNIFICANT CHANGE UP (ref 22–31)
CREAT SERPL-MCNC: 0.66 MG/DL — SIGNIFICANT CHANGE UP (ref 0.5–1.3)
CULTURE RESULTS: SIGNIFICANT CHANGE UP
CULTURE RESULTS: SIGNIFICANT CHANGE UP
EGFR: 115 ML/MIN/1.73M2 — SIGNIFICANT CHANGE UP
GLUCOSE SERPL-MCNC: 92 MG/DL — SIGNIFICANT CHANGE UP (ref 70–99)
HCT VFR BLD CALC: 27.3 % — LOW (ref 39–50)
HGB BLD-MCNC: 8.5 G/DL — LOW (ref 13–17)
MAGNESIUM SERPL-MCNC: 1.8 MG/DL — SIGNIFICANT CHANGE UP (ref 1.6–2.6)
MCHC RBC-ENTMCNC: 31.1 GM/DL — LOW (ref 32–36)
MCHC RBC-ENTMCNC: 32.3 PG — SIGNIFICANT CHANGE UP (ref 27–34)
MCV RBC AUTO: 103.8 FL — HIGH (ref 80–100)
NIGHT BLUE STAIN TISS: SIGNIFICANT CHANGE UP
NRBC # BLD: 0 /100 WBCS — SIGNIFICANT CHANGE UP
NRBC # FLD: 0 K/UL — SIGNIFICANT CHANGE UP
PHOSPHATE SERPL-MCNC: 4.1 MG/DL — SIGNIFICANT CHANGE UP (ref 2.5–4.5)
PLATELET # BLD AUTO: 514 K/UL — HIGH (ref 150–400)
POTASSIUM SERPL-MCNC: 4 MMOL/L — SIGNIFICANT CHANGE UP (ref 3.5–5.3)
POTASSIUM SERPL-SCNC: 4 MMOL/L — SIGNIFICANT CHANGE UP (ref 3.5–5.3)
PROT SERPL-MCNC: 7.5 G/DL — SIGNIFICANT CHANGE UP (ref 6–8.3)
RBC # BLD: 2.63 M/UL — LOW (ref 4.2–5.8)
RBC # FLD: 14.2 % — SIGNIFICANT CHANGE UP (ref 10.3–14.5)
SODIUM SERPL-SCNC: 137 MMOL/L — SIGNIFICANT CHANGE UP (ref 135–145)
SPECIMEN SOURCE: SIGNIFICANT CHANGE UP
WBC # BLD: 8.69 K/UL — SIGNIFICANT CHANGE UP (ref 3.8–10.5)
WBC # FLD AUTO: 8.69 K/UL — SIGNIFICANT CHANGE UP (ref 3.8–10.5)

## 2022-07-15 PROCEDURE — 99232 SBSQ HOSP IP/OBS MODERATE 35: CPT

## 2022-07-15 PROCEDURE — 99232 SBSQ HOSP IP/OBS MODERATE 35: CPT | Mod: GC

## 2022-07-15 RX ADMIN — Medication 1 MILLIGRAM(S): at 12:47

## 2022-07-15 RX ADMIN — Medication 100 MILLIGRAM(S): at 12:47

## 2022-07-15 RX ADMIN — Medication 1 PATCH: at 12:00

## 2022-07-15 RX ADMIN — Medication 1 PATCH: at 12:48

## 2022-07-15 RX ADMIN — PANTOPRAZOLE SODIUM 40 MILLIGRAM(S): 20 TABLET, DELAYED RELEASE ORAL at 05:38

## 2022-07-15 RX ADMIN — Medication 1 TABLET(S): at 12:47

## 2022-07-15 RX ADMIN — ENOXAPARIN SODIUM 30 MILLIGRAM(S): 100 INJECTION SUBCUTANEOUS at 17:50

## 2022-07-15 RX ADMIN — PANTOPRAZOLE SODIUM 40 MILLIGRAM(S): 20 TABLET, DELAYED RELEASE ORAL at 17:50

## 2022-07-15 RX ADMIN — Medication 1 PATCH: at 18:00

## 2022-07-15 RX ADMIN — Medication 1 PATCH: at 07:21

## 2022-07-15 NOTE — PROGRESS NOTE ADULT - SUBJECTIVE AND OBJECTIVE BOX
Follow Up:      Inverval History/ROS:Patient is a 51y old  Male who presents with a chief complaint of ETOH Withdrawal (15 Jul 2022 11:45)    No fever  No events    Allergies    No Known Allergies    Intolerances        ANTIMICROBIALS:      OTHER MEDS:  acetaminophen     Tablet .. 650 milliGRAM(s) Oral every 6 hours PRN  enoxaparin Injectable 30 milliGRAM(s) SubCutaneous every 24 hours  folic acid 1 milliGRAM(s) Oral daily  LORazepam     Tablet 2 milliGRAM(s) Oral every 2 hours PRN  LORazepam     Tablet 2 milliGRAM(s) Oral every 1 hour PRN  multivitamin 1 Tablet(s) Oral daily  nicotine -   7 mG/24Hr(s) Patch 1 Patch Transdermal daily  pantoprazole  Injectable 40 milliGRAM(s) IV Push every 12 hours  thiamine 100 milliGRAM(s) Oral daily      Vital Signs Last 24 Hrs  T(C): 36.6 (15 Jul 2022 14:30), Max: 37.8 (14 Jul 2022 21:10)  T(F): 97.9 (15 Jul 2022 14:30), Max: 100 (14 Jul 2022 21:10)  HR: 77 (15 Jul 2022 14:30) (77 - 100)  BP: 118/73 (15 Jul 2022 14:30) (107/75 - 118/73)  BP(mean): --  RR: 18 (15 Jul 2022 14:30) (18 - 18)  SpO2: 100% (15 Jul 2022 14:30) (100% - 100%)    Parameters below as of 15 Jul 2022 14:30  Patient On (Oxygen Delivery Method): room air        PHYSICAL EXAM:  General: [x ] non-toxic  HEAD/EYES: [ ] PERRL [ x] white sclera [ ] icterus  ENT:  [ ] normal [ x] supple [ ] thrush [ ] pharyngeal exudate  Cardiovascular:   [ ] murmur [x ] normal [ ] PPM/AICD  Respiratory:  x[ ] clear to ausculation bilaterally  GI:  [ ] soft, non-tender, normal bowel sounds  :  [ ] velasquez [ ] no CVA tenderness   Musculoskeletal:  [ ] no synovitis  Neurologic:  [ ] non-focal exam   Skin:  [ x] no rash  Lymph: x[ ] no lymphadenopathy  Psychiatric:  [ ] appropriate affect [ ] alert & oriented  Lines:  [x ] no phlebitis [ ] central line                                8.5    8.69  )-----------( 514      ( 15 Jul 2022 05:44 )             27.3       07-15    137  |  99  |  12  ----------------------------<  92  4.0   |  25  |  0.66    Ca    9.4      15 Jul 2022 05:44  Phos  4.1     07-15  Mg     1.80     07-15    TPro  7.5  /  Alb  3.4  /  TBili  0.4  /  DBili  x   /  AST  23  /  ALT  13  /  AlkPhos  93  07-15          MICROBIOLOGY:Culture Results:   No growth to date. (07-11-22 @ 17:00)  Culture Results:   No growth to date. (07-11-22 @ 17:00)  Culture Results:   No Growth Final (07-10-22 @ 05:55)  Culture Results:   No Growth Final (07-10-22 @ 05:40)      RADIOLOGY:

## 2022-07-15 NOTE — CHART NOTE - NSCHARTNOTEFT_GEN_A_CORE
Per chart---52 yo M w/ PMH of TB and alcohol use, admitted from the ED 7/6 for AMS, he started drinking when he was 21, going through 1.5 bottles of vodka every week, has poor appetite, denied seizures, or prior admissions for alcohol related problem.     Nutrition follow up for LOS. Reports good appetite and po intake and denies nausea/vomiting/diarrhea/constipation or any issues with chewing/swallowing. Last bowel movement 7/15. No new weight is available. Pt would like PO supplement added, will provide Vital Shake.     DIET : Regular    WEIGHT: admit 49.1 kg    PERTINENT MEDICATIONS: MEDICATIONS  (STANDING):  enoxaparin Injectable 30 milliGRAM(s) SubCutaneous every 24 hours  folic acid 1 milliGRAM(s) Oral daily  multivitamin 1 Tablet(s) Oral daily  nicotine -   7 mG/24Hr(s) Patch 1 Patch Transdermal daily  pantoprazole  Injectable 40 milliGRAM(s) IV Push every 12 hours  thiamine 100 milliGRAM(s) Oral daily    LABS:  07-15 Na137 mmol/L Glu 92 mg/dL K+ 4.0 mmol/L Cr  0.66 mg/dL BUN 12 mg/dL 07-15 Phos 4.1 mg/dL 07-15 Alb 3.4 g/dL    SKIN: No pressure injury     EDEMA: No edema     Nutrient Needs:  IBW 58.9 kg  [X] No Change from Initial Assessment.   3936-0657 kcal (25-30 kcal)  59-76 gm (1.0-1.3 gm/kg)     Previous Nutrition Diagnosis: Ongoing Moderate malnutrition.                                       ~~~~~RECOMMEND~~~~~  1.  Continue Regular diet. Nutrition services will provide Vital Shake 2x per day.   2.  Monitor PO intake, diet tolerance, skin integrity and pertinent labs.    3.  Please obtain current weight.    MARÍA Haider RD, CDN, CDCES

## 2022-07-15 NOTE — PROGRESS NOTE ADULT - ASSESSMENT
50 year old with a history of alcohol use presents with change in mental status thought to be at risk for withdrawal.  Has had intermittent fever.    1) Fever   DDx is broad  Alcohol withdrawal is on ddx  Treated fro aspiration pneumonia  Other cultures without growth    2) Necrotic lymph node  Seen on CT chest    Request prior imaging from [facility] to compare    If no comparison is available, may want to consider lymph node biopsy    Check sputum for afb times three - two specimens smear negative to date    Check urine histoplasma  Check blastomycosis, coccidiomycosis serology    3) H/o Tb  Treated for 11 months which is atypical   I would get records from [facility] re prior treatment history for TB     Call the ID service with questions or concerns over the weekend.  [phone]

## 2022-07-15 NOTE — PROGRESS NOTE ADULT - PROBLEM SELECTOR PLAN 2
Patient continues to spike low grade fever while being treated with Zosyn (Day 5). 7/11/22: 100.7 around noon and 99.4 on repeat at 1:30pm. Also hypotensive to 82/49 around noon.  Afebrile since 7/12/22. Tmax overnight 99.7. Patient is currently asymptomatic except for occasional cough.  BCx 7/6 NEG, 7/10 NGT  7/13  UA negative 7/11  Lactate 1.6 7/11  CXR 7/11 clear lungs    7/13: CT Chest revealed 3cm necrotic central lymph node concerning for TB. Patient moved to isolation  7/14: afebrile. ID consulted, plan for AFB x 3, sputum culture, Histo urine Ag, Coccidioides Ab, Blasto Ab  - f/u AFB  - f/u sputum culture  - f/u Histo urine Ag  - f/u Coccidiodes Ab  - f/u Blasto Ab  - Monitor temperature off Zosyn Patient continues to spike low grade fever while being treated with Zosyn (Day 5). 7/11/22: 100.7 around noon and 99.4 on repeat at 1:30pm. Also hypotensive to 82/49 around noon.  Afebrile since 7/12/22. Tmax overnight 99.7. Patient is currently asymptomatic except for occasional cough.  BCx 7/6 NEG, 7/10 NGT  7/13  UA negative 7/11  Lactate 1.6 7/11  CXR 7/11 clear lungs    7/13: CT Chest revealed 3cm necrotic central lymph node concerning for TB. Patient moved to isolation  7/14: afebrile. ID consulted, plan for AFB x 3, sputum culture, Histo urine Ag, Coccidioides Ab, Blasto Ab  7/15: Tmax 100. AFB x2 negative, Histo, Coccidiodes, Blasto pending.     Fever likely from the necrotic central lymph node. ID recommendations appreciated for possible biopsy of the lymph node.     - f/u 3rd AFB  - f/u Histo urine Ag  - f/u Coccidiodes Ab  - f/u Blasto Ab  - Monitor temperature off Zosyn  - ID recommendations appreciated RESOLVED    Patient presented w/ AMS and reported visual hallucinations as per sister concerning for alcohol-induced psychosis vs withdrawal vs DT.     [facility] discharged 7/12/22.    - Does report fall recently (~2 weeks ago), however CTH negative in ED on 7/6  - Loaded w/ 600 mg phenobarb IV & started on precedex on transfer to MICU     ---- Precedex discontinued, no further agitation/restlessness and not requiring phenobarb.  - Thiamine 500mg IVPB x 3 days, now Thiamine 100mg PO QD + Folate  - mild transaminitis; now resolved  - Due to low BP, he is on LR 75ml/hr for 12 hours  - SBIRT consulted; pt uncooperative refer to SW note  - PT consult for unsteady gait but cleared for d/c

## 2022-07-15 NOTE — PROGRESS NOTE ADULT - SUBJECTIVE AND OBJECTIVE BOX
PROGRESS NOTE:   Authored by Dr. Lauren Montague    Patient is a 51y old  Male who presents with a chief complaint of ETOH Withdrawal (14 Jul 2022 11:53)      SUBJECTIVE / OVERNIGHT EVENTS:    ADDITIONAL REVIEW OF SYSTEMS:    MEDICATIONS  (STANDING):  enoxaparin Injectable 30 milliGRAM(s) SubCutaneous every 24 hours  folic acid 1 milliGRAM(s) Oral daily  multivitamin 1 Tablet(s) Oral daily  nicotine -   7 mG/24Hr(s) Patch 1 Patch Transdermal daily  pantoprazole  Injectable 40 milliGRAM(s) IV Push every 12 hours  thiamine 100 milliGRAM(s) Oral daily    MEDICATIONS  (PRN):  acetaminophen     Tablet .. 650 milliGRAM(s) Oral every 6 hours PRN Temp greater or equal to 38C (100.4F)  LORazepam     Tablet 2 milliGRAM(s) Oral every 2 hours PRN Symptom-triggered 2 point increase in CIWA-Ar  LORazepam     Tablet 2 milliGRAM(s) Oral every 1 hour PRN Symptom-triggered: each CIWA -Ar score 8 or GREATER      CAPILLARY BLOOD GLUCOSE        I&O's Summary    14 Jul 2022 07:01  -  15 Jul 2022 07:00  --------------------------------------------------------  IN: 2020 mL / OUT: 0 mL / NET: 2020 mL        PHYSICAL EXAM:  Vital Signs Last 24 Hrs  T(C): 36.9 (15 Jul 2022 05:38), Max: 37.8 (14 Jul 2022 21:10)  T(F): 98.4 (15 Jul 2022 05:38), Max: 100 (14 Jul 2022 21:10)  HR: 85 (15 Jul 2022 05:38) (85 - 100)  BP: 117/76 (15 Jul 2022 05:38) (100/69 - 117/76)  BP(mean): --  RR: 18 (15 Jul 2022 05:38) (18 - 18)  SpO2: 100% (15 Jul 2022 05:38) (100% - 100%)    Parameters below as of 15 Jul 2022 05:38  Patient On (Oxygen Delivery Method): room air        GENERAL: NAD, lying comfortably in bed  HEAD: Atraumatic, normocephalic  EYES: EOMI b/l PERRLA b/l, conjunctiva and sclera clear  NECK: Supple, No JVD, No LAD  RESPIRATORY: Normal respiratory effort; lungs are clear to auscultation bilaterally  CARDIOVASCULAR: Regular rate and rhythm, normal S1 and S2, no murmur/rub/gallop; No lower extremity edema  ABDOMEN: Nontender, normoactive bowel sounds, no rebound/guarding; No hepatosplenomegaly  MUSCULOSKELETAL: no clubbing or cyanosis of digits; no joint swelling or tenderness to palpation  NEURO: Non focal   PSYCH: A+O to person, place, and time; affect appropriate    LABS:                          8.5    8.69  )-----------( 514      ( 15 Jul 2022 05:44 )             27.3     07-15    137  |  99  |  12  ----------------------------<  92  4.0   |  25  |  0.66    Ca    9.4      15 Jul 2022 05:44  Phos  4.1     07-15  Mg     1.80     07-15    TPro  7.5  /  Alb  3.4  /  TBili  0.4  /  DBili  x   /  AST  23  /  ALT  13  /  AlkPhos  93  07-15              Culture - Sputum (collected 14 Jul 2022 15:55)  Source: .Sputum Sputum  Gram Stain (14 Jul 2022 21:16):    No polymorphonuclear leukocytes per low power field    Moderate Squamous epithelial cells per low power field    Moderate Gram positive cocci in pairs, chains and clusters per oil power    field    Moderate Gram Negative Coccobacilli per oil power field    Few Gram Variable Rods per oil power field    Results consistent with oropharyngeal contamination    Culture - Acid Fast - Sputum w/Smear (collected 14 Jul 2022 11:30)  Source: .Sputum Sputum    Culture - Acid Fast - Sputum w/Smear (collected 13 Jul 2022 18:49)  Source: .Sputum Sputum       PROGRESS NOTE:       Patient is a 51y old  Male who presents with a chief complaint of ETOH Withdrawal (14 Jul 2022 11:53)      SUBJECTIVE / OVERNIGHT EVENTS:    ADDITIONAL REVIEW OF SYSTEMS:    MEDICATIONS  (STANDING):  enoxaparin Injectable 30 milliGRAM(s) SubCutaneous every 24 hours  folic acid 1 milliGRAM(s) Oral daily  multivitamin 1 Tablet(s) Oral daily  nicotine -   7 mG/24Hr(s) Patch 1 Patch Transdermal daily  pantoprazole  Injectable 40 milliGRAM(s) IV Push every 12 hours  thiamine 100 milliGRAM(s) Oral daily    MEDICATIONS  (PRN):  acetaminophen     Tablet .. 650 milliGRAM(s) Oral every 6 hours PRN Temp greater or equal to 38C (100.4F)  LORazepam     Tablet 2 milliGRAM(s) Oral every 2 hours PRN Symptom-triggered 2 point increase in CIWA-Ar  LORazepam     Tablet 2 milliGRAM(s) Oral every 1 hour PRN Symptom-triggered: each CIWA -Ar score 8 or GREATER      CAPILLARY BLOOD GLUCOSE        I&O's Summary    14 Jul 2022 07:01  -  15 Jul 2022 07:00  --------------------------------------------------------  IN: 2020 mL / OUT: 0 mL / NET: 2020 mL        PHYSICAL EXAM:  Vital Signs Last 24 Hrs  T(C): 36.9 (15 Jul 2022 05:38), Max: 37.8 (14 Jul 2022 21:10)  T(F): 98.4 (15 Jul 2022 05:38), Max: 100 (14 Jul 2022 21:10)  HR: 85 (15 Jul 2022 05:38) (85 - 100)  BP: 117/76 (15 Jul 2022 05:38) (100/69 - 117/76)  BP(mean): --  RR: 18 (15 Jul 2022 05:38) (18 - 18)  SpO2: 100% (15 Jul 2022 05:38) (100% - 100%)    Parameters below as of 15 Jul 2022 05:38  Patient On (Oxygen Delivery Method): room air        GENERAL: NAD, lying comfortably in bed  HEAD: Atraumatic, normocephalic  EYES: EOMI b/l PERRLA b/l, conjunctiva and sclera clear  NECK: Supple, No JVD, No LAD  RESPIRATORY: Normal respiratory effort; lungs are clear to auscultation bilaterally  CARDIOVASCULAR: Regular rate and rhythm, normal S1 and S2, no murmur/rub/gallop; No lower extremity edema  ABDOMEN: Nontender, normoactive bowel sounds, no rebound/guarding; No hepatosplenomegaly  MUSCULOSKELETAL: no clubbing or cyanosis of digits; no joint swelling or tenderness to palpation  NEURO: Non focal   PSYCH: A+O to person, place, and time; affect appropriate    LABS:                          8.5    8.69  )-----------( 514      ( 15 Jul 2022 05:44 )             27.3     07-15    137  |  99  |  12  ----------------------------<  92  4.0   |  25  |  0.66    Ca    9.4      15 Jul 2022 05:44  Phos  4.1     07-15  Mg     1.80     07-15    TPro  7.5  /  Alb  3.4  /  TBili  0.4  /  DBili  x   /  AST  23  /  ALT  13  /  AlkPhos  93  07-15              Culture - Sputum (collected 14 Jul 2022 15:55)  Source: .Sputum Sputum  Gram Stain (14 Jul 2022 21:16):    No polymorphonuclear leukocytes per low power field    Moderate Squamous epithelial cells per low power field    Moderate Gram positive cocci in pairs, chains and clusters per oil power    field    Moderate Gram Negative Coccobacilli per oil power field    Few Gram Variable Rods per oil power field    Results consistent with oropharyngeal contamination    Culture - Acid Fast - Sputum w/Smear (collected 14 Jul 2022 11:30)  Source: .Sputum Sputum    Culture - Acid Fast - Sputum w/Smear (collected 13 Jul 2022 18:49)  Source: .Sputum Sputum       PROGRESS NOTE:       Patient is a 51y old  Male who presents with a chief complaint of ETOH Withdrawal (14 Jul 2022 11:53)      SUBJECTIVE / OVERNIGHT EVENTS: No events overnight. T max 100 at 9pm. Pt continues to endorse the occasional dry cough with throat itchiness. Pt states he has had the cough since before his admission. No other active complaints.     ADDITIONAL REVIEW OF SYSTEMS:  CONSTITUTIONAL: No fever, chills  EYES: No visual disturbances   ENMT:  +throat itchiness; No difficulty hearing; No sinus or throat pain  RESPIRATORY: + dry cough; No wheezing or hemoptysis; No shortness of breath  CARDIOVASCULAR: No chest pain, palpitations or leg swelling  GASTROINTESTINAL: No abdominal or epigastric pain. No nausea, vomiting, or hematemesis; No diarrhea or constipation. No melena or hematochezia.  GENITOURINARY: No incontinence  NEUROLOGICAL: No headaches, loss of strength, numbness, or tremors  SKIN: No itching, burning, rashes, or lesions   LYMPH NODES: No enlarged glands  ENDOCRINE: No heat or cold intolerance; No polydipsia or polyuria  MUSCULOSKELETAL: No joint pain or swelling;   PSYCHIATRIC: Denies depression, anxiety  HEME/LYMPH: No easy bruising, or bleeding gums  ALLERGY AND IMMUNOLOGIC: No hives or eczema        MEDICATIONS  (STANDING):  enoxaparin Injectable 30 milliGRAM(s) SubCutaneous every 24 hours  folic acid 1 milliGRAM(s) Oral daily  multivitamin 1 Tablet(s) Oral daily  nicotine -   7 mG/24Hr(s) Patch 1 Patch Transdermal daily  pantoprazole  Injectable 40 milliGRAM(s) IV Push every 12 hours  thiamine 100 milliGRAM(s) Oral daily    MEDICATIONS  (PRN):  acetaminophen     Tablet .. 650 milliGRAM(s) Oral every 6 hours PRN Temp greater or equal to 38C (100.4F)  LORazepam     Tablet 2 milliGRAM(s) Oral every 2 hours PRN Symptom-triggered 2 point increase in CIWA-Ar  LORazepam     Tablet 2 milliGRAM(s) Oral every 1 hour PRN Symptom-triggered: each CIWA -Ar score 8 or GREATER      CAPILLARY BLOOD GLUCOSE        I&O's Summary    14 Jul 2022 07:01  -  15 Jul 2022 07:00  --------------------------------------------------------  IN: 2020 mL / OUT: 0 mL / NET: 2020 mL        PHYSICAL EXAM:  Vital Signs Last 24 Hrs  T(C): 36.9 (15 Jul 2022 05:38), Max: 37.8 (14 Jul 2022 21:10)  T(F): 98.4 (15 Jul 2022 05:38), Max: 100 (14 Jul 2022 21:10)  HR: 85 (15 Jul 2022 05:38) (85 - 100)  BP: 117/76 (15 Jul 2022 05:38) (100/69 - 117/76)  BP(mean): --  RR: 18 (15 Jul 2022 05:38) (18 - 18)  SpO2: 100% (15 Jul 2022 05:38) (100% - 100%)    Parameters below as of 15 Jul 2022 05:38  Patient On (Oxygen Delivery Method): room air        GENERAL: NAD, lying comfortably in bed  HEAD: Atraumatic, normocephalic  EYES: EOMI b/l PERRLA b/l, conjunctiva and sclera clear  NECK: Supple, No JVD, No LAD  RESPIRATORY: Normal respiratory effort; lungs are clear to auscultation bilaterally  CARDIOVASCULAR: Regular rate and rhythm, normal S1 and S2, no murmur/rub/gallop; No lower extremity edema  ABDOMEN: Nontender, normoactive bowel sounds, no rebound/guarding; No hepatosplenomegaly  MUSCULOSKELETAL: no clubbing or cyanosis of digits; no joint swelling or tenderness to palpation  NEURO: Non focal   PSYCH: A+O to person, place, and time; affect appropriate    LABS:                          8.5    8.69  )-----------( 514      ( 15 Jul 2022 05:44 )             27.3     07-15    137  |  99  |  12  ----------------------------<  92  4.0   |  25  |  0.66    Ca    9.4      15 Jul 2022 05:44  Phos  4.1     07-15  Mg     1.80     07-15    TPro  7.5  /  Alb  3.4  /  TBili  0.4  /  DBili  x   /  AST  23  /  ALT  13  /  AlkPhos  93  07-15              Culture - Sputum (collected 14 Jul 2022 15:55)  Source: .Sputum Sputum  Gram Stain (14 Jul 2022 21:16):    No polymorphonuclear leukocytes per low power field    Moderate Squamous epithelial cells per low power field    Moderate Gram positive cocci in pairs, chains and clusters per oil power    field    Moderate Gram Negative Coccobacilli per oil power field    Few Gram Variable Rods per oil power field    Results consistent with oropharyngeal contamination    Culture - Acid Fast - Sputum w/Smear (collected 14 Jul 2022 11:30)  Source: .Sputum Sputum    Culture - Acid Fast - Sputum w/Smear (collected 13 Jul 2022 18:49)  Source: .Sputum Sputum       PROGRESS NOTE:       Patient is a 51y old  Male who presents with a chief complaint of ETOH Withdrawal (14 Jul 2022 11:53)      SUBJECTIVE / OVERNIGHT EVENTS: No events overnight. T max 100 at 9pm. Pt continues to endorse the occasional dry cough with throat itchiness. Pt states he has had the cough since before his admission. No other active complaints.     ADDITIONAL REVIEW OF SYSTEMS:  CONSTITUTIONAL: No fever, chills  EYES: No visual disturbances   ENMT:  +throat itchiness; No difficulty hearing; No sinus or throat pain  RESPIRATORY: + dry cough; No wheezing or hemoptysis; No shortness of breath  CARDIOVASCULAR: No chest pain, palpitations or leg swelling  GASTROINTESTINAL: No abdominal or epigastric pain. No nausea, vomiting, or hematemesis; No diarrhea or constipation. No melena or hematochezia.  GENITOURINARY: No incontinence  NEUROLOGICAL: No headaches, loss of strength, numbness, or tremors  SKIN: No itching, burning, rashes, or lesions   LYMPH NODES: No enlarged glands  ENDOCRINE: No heat or cold intolerance; No polydipsia or polyuria  MUSCULOSKELETAL: No joint pain or swelling;   PSYCHIATRIC: Denies depression, anxiety  HEME/LYMPH: No easy bruising, or bleeding gums  ALLERGY AND IMMUNOLOGIC: No hives or eczema        MEDICATIONS  (STANDING):  enoxaparin Injectable 30 milliGRAM(s) SubCutaneous every 24 hours  folic acid 1 milliGRAM(s) Oral daily  multivitamin 1 Tablet(s) Oral daily  nicotine -   7 mG/24Hr(s) Patch 1 Patch Transdermal daily  pantoprazole  Injectable 40 milliGRAM(s) IV Push every 12 hours  thiamine 100 milliGRAM(s) Oral daily    MEDICATIONS  (PRN):  acetaminophen     Tablet .. 650 milliGRAM(s) Oral every 6 hours PRN Temp greater or equal to 38C (100.4F)  LORazepam     Tablet 2 milliGRAM(s) Oral every 2 hours PRN Symptom-triggered 2 point increase in CIWA-Ar  LORazepam     Tablet 2 milliGRAM(s) Oral every 1 hour PRN Symptom-triggered: each CIWA -Ar score 8 or GREATER      CAPILLARY BLOOD GLUCOSE        I&O's Summary    14 Jul 2022 07:01  -  15 Jul 2022 07:00  --------------------------------------------------------  IN: 2020 mL / OUT: 0 mL / NET: 2020 mL        PHYSICAL EXAM:  Vital Signs Last 24 Hrs  T(C): 36.9 (15 Jul 2022 05:38), Max: 37.8 (14 Jul 2022 21:10)  T(F): 98.4 (15 Jul 2022 05:38), Max: 100 (14 Jul 2022 21:10)  HR: 85 (15 Jul 2022 05:38) (85 - 100)  BP: 117/76 (15 Jul 2022 05:38) (100/69 - 117/76)  BP(mean): --  RR: 18 (15 Jul 2022 05:38) (18 - 18)  SpO2: 100% (15 Jul 2022 05:38) (100% - 100%)    Parameters below as of 15 Jul 2022 05:38  Patient On (Oxygen Delivery Method): room air        GENERAL: NAD, lying comfortably in bed  HEAD: Atraumatic, normocephalic  EYES: EOMI b/l PERRLA b/l, conjunctiva and sclera clear  NECK: Supple, No JVD, No LAD  RESPIRATORY: Normal respiratory effort; lungs are clear to auscultation bilaterally  CARDIOVASCULAR: Regular rate and rhythm, normal S1 and S2; No lower extremity edema  ABDOMEN: Nontender, no rebound/guarding   MUSCULOSKELETAL: no joint swelling   NEURO: Non focal   PSYCH: A+O to person, place, and time; affect appropriate    LABS:                          8.5    8.69  )-----------( 514      ( 15 Jul 2022 05:44 )             27.3     07-15    137  |  99  |  12  ----------------------------<  92  4.0   |  25  |  0.66    Ca    9.4      15 Jul 2022 05:44  Phos  4.1     07-15  Mg     1.80     07-15    TPro  7.5  /  Alb  3.4  /  TBili  0.4  /  DBili  x   /  AST  23  /  ALT  13  /  AlkPhos  93  07-15              Culture - Sputum (collected 14 Jul 2022 15:55)  Source: .Sputum Sputum  Gram Stain (14 Jul 2022 21:16):    No polymorphonuclear leukocytes per low power field    Moderate Squamous epithelial cells per low power field    Moderate Gram positive cocci in pairs, chains and clusters per oil power    field    Moderate Gram Negative Coccobacilli per oil power field    Few Gram Variable Rods per oil power field    Results consistent with oropharyngeal contamination    Culture - Acid Fast - Sputum w/Smear (collected 14 Jul 2022 11:30)  Source: .Sputum Sputum    Culture - Acid Fast - Sputum w/Smear (collected 13 Jul 2022 18:49)  Source: .Sputum Sputum

## 2022-07-15 NOTE — PROGRESS NOTE ADULT - PROBLEM SELECTOR PLAN 1
Patient presented w/ AMS and reported visual hallucinations as per sister concerning for alcohol-induced psychosis vs withdrawal vs DT. CIWA score has been ranging <6 in the past 72 hours and ROJAS was discharged overnight 7/12/22.    - Does report fall recently (~2 weeks ago), however CTH negative in ED on 7/6  - Loaded w/ 600 mg phenobarb IV & started on precedex on transfer to MICU     ---- Precedex discontinued, no further agitation/restlessness and not requiring phenobarb.  - Thiamine 500mg IVPB x 3 days, now Thiamine 100mg PO QD + Folate  - mild transaminitis; now resolved  - Due to low BP, he is on LR 75ml/hr for 12 hours  - SBIRT consulted; pt uncooperative refer to SW note  - PT consult for unsteady gait but cleared for d/c Patient continues to spike low grade fever while being treated with Zosyn (Day 5). 7/11/22: 100.7 around noon and 99.4 on repeat at 1:30pm. Also hypotensive to 82/49 around noon.  Afebrile since 7/12/22. Tmax overnight 99.7. Patient is currently asymptomatic except for occasional cough.  BCx 7/6 NEG, 7/10 NGT  7/13  UA negative 7/11  Lactate 1.6 7/11  CXR 7/11 clear lungs    7/13: CT Chest revealed 3cm necrotic central lymph node concerning for TB. Patient moved to isolation  7/14: afebrile. ID consulted, plan for AFB x 3, sputum culture, Histo urine Ag, Coccidioides Ab, Blasto Ab  7/15: Tmax 100. AFB x2 negative, Histo, Coccidiodes, Blasto pending.     Fever likely from the necrotic central lymph node. ID recommendations appreciated for possible biopsy of the lymph node.     - f/u 3rd AFB  - f/u Histo urine Ag  - f/u Coccidiodes Ab  - f/u Blasto Ab  - Monitor temperature off Zosyn  - ID recommendations appreciated

## 2022-07-16 LAB
ALBUMIN SERPL ELPH-MCNC: 3.7 G/DL — SIGNIFICANT CHANGE UP (ref 3.3–5)
ALP SERPL-CCNC: 95 U/L — SIGNIFICANT CHANGE UP (ref 40–120)
ALT FLD-CCNC: 11 U/L — SIGNIFICANT CHANGE UP (ref 4–41)
ANION GAP SERPL CALC-SCNC: 12 MMOL/L — SIGNIFICANT CHANGE UP (ref 7–14)
AST SERPL-CCNC: 19 U/L — SIGNIFICANT CHANGE UP (ref 4–40)
BILIRUB SERPL-MCNC: 0.4 MG/DL — SIGNIFICANT CHANGE UP (ref 0.2–1.2)
BUN SERPL-MCNC: 15 MG/DL — SIGNIFICANT CHANGE UP (ref 7–23)
CALCIUM SERPL-MCNC: 9 MG/DL — SIGNIFICANT CHANGE UP (ref 8.4–10.5)
CHLORIDE SERPL-SCNC: 102 MMOL/L — SIGNIFICANT CHANGE UP (ref 98–107)
CO2 SERPL-SCNC: 25 MMOL/L — SIGNIFICANT CHANGE UP (ref 22–31)
CREAT SERPL-MCNC: 0.68 MG/DL — SIGNIFICANT CHANGE UP (ref 0.5–1.3)
CULTURE RESULTS: SIGNIFICANT CHANGE UP
EGFR: 114 ML/MIN/1.73M2 — SIGNIFICANT CHANGE UP
GLUCOSE SERPL-MCNC: 116 MG/DL — HIGH (ref 70–99)
HCT VFR BLD CALC: 27.7 % — LOW (ref 39–50)
HGB BLD-MCNC: 8.7 G/DL — LOW (ref 13–17)
MCHC RBC-ENTMCNC: 31.4 GM/DL — LOW (ref 32–36)
MCHC RBC-ENTMCNC: 32.1 PG — SIGNIFICANT CHANGE UP (ref 27–34)
MCV RBC AUTO: 102.2 FL — HIGH (ref 80–100)
NRBC # BLD: 0 /100 WBCS — SIGNIFICANT CHANGE UP
NRBC # FLD: 0 K/UL — SIGNIFICANT CHANGE UP
PLATELET # BLD AUTO: 498 K/UL — HIGH (ref 150–400)
POTASSIUM SERPL-MCNC: 4 MMOL/L — SIGNIFICANT CHANGE UP (ref 3.5–5.3)
POTASSIUM SERPL-SCNC: 4 MMOL/L — SIGNIFICANT CHANGE UP (ref 3.5–5.3)
PROT SERPL-MCNC: 6.6 G/DL — SIGNIFICANT CHANGE UP (ref 6–8.3)
RBC # BLD: 2.71 M/UL — LOW (ref 4.2–5.8)
RBC # FLD: 14.2 % — SIGNIFICANT CHANGE UP (ref 10.3–14.5)
SODIUM SERPL-SCNC: 139 MMOL/L — SIGNIFICANT CHANGE UP (ref 135–145)
SPECIMEN SOURCE: SIGNIFICANT CHANGE UP
WBC # BLD: 8.48 K/UL — SIGNIFICANT CHANGE UP (ref 3.8–10.5)
WBC # FLD AUTO: 8.48 K/UL — SIGNIFICANT CHANGE UP (ref 3.8–10.5)

## 2022-07-16 PROCEDURE — 99232 SBSQ HOSP IP/OBS MODERATE 35: CPT

## 2022-07-16 RX ORDER — PANTOPRAZOLE SODIUM 20 MG/1
40 TABLET, DELAYED RELEASE ORAL
Refills: 0 | Status: DISCONTINUED | OUTPATIENT
Start: 2022-07-16 | End: 2022-07-20

## 2022-07-16 RX ADMIN — Medication 1 TABLET(S): at 11:44

## 2022-07-16 RX ADMIN — PANTOPRAZOLE SODIUM 40 MILLIGRAM(S): 20 TABLET, DELAYED RELEASE ORAL at 07:07

## 2022-07-16 RX ADMIN — Medication 1 PATCH: at 12:01

## 2022-07-16 RX ADMIN — Medication 1 PATCH: at 11:43

## 2022-07-16 RX ADMIN — Medication 1 PATCH: at 18:41

## 2022-07-16 RX ADMIN — Medication 1 MILLIGRAM(S): at 11:44

## 2022-07-16 RX ADMIN — ENOXAPARIN SODIUM 30 MILLIGRAM(S): 100 INJECTION SUBCUTANEOUS at 17:23

## 2022-07-16 RX ADMIN — Medication 100 MILLIGRAM(S): at 11:44

## 2022-07-16 RX ADMIN — Medication 1 PATCH: at 07:16

## 2022-07-16 NOTE — PROGRESS NOTE ADULT - PROBLEM SELECTOR PLAN 1
Patient continued to spike low grade fever while being treated with Zosyn (Day 5). 7/11/22: 100.7 around noon and 99.4 on repeat at 1:30pm.  Afebrile since 7/12/22. Tmax overnight 98.4. Patient is currently asymptomatic except for occasional cough.  BCx 7/6 NEG, 7/10 NEG  UA negative 7/11  Lactate 1.6 7/11  CXR 7/11 clear lungs  7/13: CT Chest revealed 3cm necrotic central lymph node concerning for TB. Patient moved to isolation  7/14: afebrile. ID consulted, plan for AFB x 3, sputum culture, Histo urine Ag, Coccidioides Ab, Blasto Ab  7/15: Tmax 100. AFB x2 negative, Histo, Coccidiodes, Blasto pending.   7/16: Tmax 98.4, AFB x 3 negative, Sputum culture normal abiel. Histo, Coccidio, Blaso still pending.    Fever likely from the necrotic central lymph node vs ETOH withdrawal.  Records from [facility] received on 7/15: last CT chest without necrotic lymph node. Likely a new finding.    - f/u Histo urine Ag  - f/u Coccidiodes Ab  - f/u Blasto Ab  - Monitor temperature off Zosyn  - ID recommendations appreciated for biopsy

## 2022-07-16 NOTE — PROGRESS NOTE ADULT - SUBJECTIVE AND OBJECTIVE BOX
PROGRESS NOTE:   Authored by Dr. Dylan Verdugo    Patient is a 51y old  Male who presents with a chief complaint of ETOH Withdrawal (15 Jul 2022 15:57)      SUBJECTIVE / OVERNIGHT EVENTS: No acute overnight events. His cough is unchanged, occasional dry cough.     ADDITIONAL REVIEW OF SYSTEMS:  CONSTITUTIONAL: No fever   EYES: No visual disturbances   ENMT:  No difficulty hearing   RESPIRATORY: +occasional dry cough, No chills; No shortness of breath  CARDIOVASCULAR: No chest pain, palpitations, leg swelling  GASTROINTESTINAL: No abdominal or epigastric pain. No nausea, vomiting, or hematemesis; No diarrhea or constipation. No melena or hematochezia.  GENITOURINARY: No incontinence  NEUROLOGICAL: No headaches, loss of strength  SKIN: No itching  ENDOCRINE:  No polydipsia or polyuria  MUSCULOSKELETAL: No joint pain or swelling  ALLERGY AND IMMUNOLOGIC: No hives or eczema    MEDICATIONS  (STANDING):  enoxaparin Injectable 30 milliGRAM(s) SubCutaneous every 24 hours  folic acid 1 milliGRAM(s) Oral daily  multivitamin 1 Tablet(s) Oral daily  nicotine -   7 mG/24Hr(s) Patch 1 Patch Transdermal daily  pantoprazole  Injectable 40 milliGRAM(s) IV Push every 12 hours  thiamine 100 milliGRAM(s) Oral daily    MEDICATIONS  (PRN):  acetaminophen     Tablet .. 650 milliGRAM(s) Oral every 6 hours PRN Temp greater or equal to 38C (100.4F)      CAPILLARY BLOOD GLUCOSE        I&O's Summary    15 Jul 2022 07:01  -  16 Jul 2022 07:00  --------------------------------------------------------  IN: 1440 mL / OUT: 0 mL / NET: 1440 mL        PHYSICAL EXAM:  Vital Signs Last 24 Hrs  T(C): 36.9 (16 Jul 2022 07:15), Max: 36.9 (15 Jul 2022 21:52)  T(F): 98.4 (16 Jul 2022 07:15), Max: 98.4 (15 Jul 2022 21:52)  HR: 66 (16 Jul 2022 07:15) (66 - 82)  BP: 118/78 (16 Jul 2022 07:15) (107/75 - 118/78)  BP(mean): --  RR: 18 (16 Jul 2022 07:15) (18 - 18)  SpO2: 100% (16 Jul 2022 07:15) (100% - 100%)    Parameters below as of 16 Jul 2022 07:15  Patient On (Oxygen Delivery Method): room air        GENERAL: NAD, lying comfortably in bed  HEAD: Atraumatic, normocephalic  EYES: EOMI b/l, conjunctiva and sclera clear  NECK: Supple   RESPIRATORY: Normal respiratory effort; lungs are clear to auscultation bilaterally  CARDIOVASCULAR: Regular rate and rhythm, normal S1 and S2, no murmur/rub/gallop; No lower extremity edema  ABDOMEN: Nontender, no rebound/guarding   MUSCULOSKELETAL:  no joint swelling or tenderness to palpation  NEURO: Non focal   PSYCH: A+O to person, place, and time; affect appropriate    LABS:                          8.7    8.48  )-----------( 498      ( 16 Jul 2022 07:00 )             27.7     07-16    139  |  102  |  15  ----------------------------<  116<H>  4.0   |  25  |  0.68    Ca    9.0      16 Jul 2022 07:00  Phos  4.1     07-15  Mg     1.80     07-15    TPro  6.6  /  Alb  3.7  /  TBili  0.4  /  DBili  x   /  AST  19  /  ALT  11  /  AlkPhos  95  07-16              Culture - Acid Fast - Sputum w/Smear (collected 15 Jul 2022 11:52)  Source: .Sputum Sputum    Culture - Sputum (collected 14 Jul 2022 15:55)  Source: .Sputum Sputum  Gram Stain (14 Jul 2022 21:16):    No polymorphonuclear leukocytes per low power field    Moderate Squamous epithelial cells per low power field    Moderate Gram positive cocci in pairs, chains and clusters per oil power    field    Moderate Gram Negative Coccobacilli per oil power field    Few Gram Variable Rods per oil power field    Results consistent with oropharyngeal contamination  Preliminary Report (15 Jul 2022 17:21):    Normal Respiratory Altagracia present    Culture - Acid Fast - Sputum w/Smear (collected 14 Jul 2022 11:30)  Source: .Sputum Sputum    Culture - Acid Fast - Sputum w/Smear (collected 13 Jul 2022 18:49)  Source: .Sputum Sputum

## 2022-07-16 NOTE — PROGRESS NOTE ADULT - PROBLEM SELECTOR PLAN 2
RESOLVED    Patient presented w/ AMS and reported visual hallucinations as per sister concerning for alcohol-induced psychosis vs withdrawal vs DT.     CIWA discharged 7/12/22.    - Does report fall recently (~2 weeks ago), however CTH negative in ED on 7/6  - Loaded w/ 600 mg phenobarb IV & started on precedex on transfer to MICU     ---- Precedex discontinued, no further agitation/restlessness and not requiring phenobarb.  - Thiamine 500mg IVPB x 3 days, now Thiamine 100mg PO QD + Folate  - mild transaminitis; now resolved  - Due to low BP, he is on LR 75ml/hr for 12 hours  - SBIRT consulted; pt uncooperative refer to SW note  - PT consult for unsteady gait but cleared for d/c

## 2022-07-16 NOTE — PROGRESS NOTE ADULT - PROBLEM SELECTOR PLAN 3
RESOLVED  Pt was found to have Hb 7.1 from previous Hb 2 weeks ago was 12.3.  Today on repeat CBC: 7.8/24.1   RESOLVED  - initially no reports of bleeding, pt denies any bloody vomiting or stools. However pt family member reported seeing blood in toilet & sink 2 days prior to admission  - started on IV PPI BID 7/6  - cbc q12, transfuse Hb <7  - GI consulted, Anemia, macrocytic. Hgb stable. Patient denies hematochezia, melena. Given concomitant thrombocytopenia, likely in setting of chronic alcohol use  - no plan for GI intervention at this time  - H/H monitoring Pt was found to have Hb 7.1 from previous Hb 2 weeks ago was 12.3.  - initially no reports of bleeding, pt denies any bloody vomiting or stools. However pt family member reported seeing blood in toilet & sink 2 days prior to admission  - started on IV PPI BID 7/6--> transition to oral  - cbc q12, transfuse Hb <7  - GI consulted, Anemia, macrocytic. Hgb stable. Patient denies hematochezia, melena. Given concomitant thrombocytopenia, likely in setting of chronic alcohol use  - no plan for GI intervention at this time  - H/H monitoring

## 2022-07-17 LAB
ALBUMIN SERPL ELPH-MCNC: 3.6 G/DL — SIGNIFICANT CHANGE UP (ref 3.3–5)
ALP SERPL-CCNC: 101 U/L — SIGNIFICANT CHANGE UP (ref 40–120)
ALT FLD-CCNC: 12 U/L — SIGNIFICANT CHANGE UP (ref 4–41)
ANION GAP SERPL CALC-SCNC: 11 MMOL/L — SIGNIFICANT CHANGE UP (ref 7–14)
AST SERPL-CCNC: 17 U/L — SIGNIFICANT CHANGE UP (ref 4–40)
BILIRUB SERPL-MCNC: 0.3 MG/DL — SIGNIFICANT CHANGE UP (ref 0.2–1.2)
BUN SERPL-MCNC: 13 MG/DL — SIGNIFICANT CHANGE UP (ref 7–23)
CALCIUM SERPL-MCNC: 9.4 MG/DL — SIGNIFICANT CHANGE UP (ref 8.4–10.5)
CHLORIDE SERPL-SCNC: 99 MMOL/L — SIGNIFICANT CHANGE UP (ref 98–107)
CO2 SERPL-SCNC: 26 MMOL/L — SIGNIFICANT CHANGE UP (ref 22–31)
CREAT SERPL-MCNC: 0.66 MG/DL — SIGNIFICANT CHANGE UP (ref 0.5–1.3)
EGFR: 115 ML/MIN/1.73M2 — SIGNIFICANT CHANGE UP
GLUCOSE SERPL-MCNC: 108 MG/DL — HIGH (ref 70–99)
HCT VFR BLD CALC: 30 % — LOW (ref 39–50)
HGB BLD-MCNC: 9 G/DL — LOW (ref 13–17)
MAGNESIUM SERPL-MCNC: 1.6 MG/DL — SIGNIFICANT CHANGE UP (ref 1.6–2.6)
MCHC RBC-ENTMCNC: 30 GM/DL — LOW (ref 32–36)
MCHC RBC-ENTMCNC: 31 PG — SIGNIFICANT CHANGE UP (ref 27–34)
MCV RBC AUTO: 103.4 FL — HIGH (ref 80–100)
NRBC # BLD: 0 /100 WBCS — SIGNIFICANT CHANGE UP
NRBC # FLD: 0 K/UL — SIGNIFICANT CHANGE UP
PHOSPHATE SERPL-MCNC: 3.8 MG/DL — SIGNIFICANT CHANGE UP (ref 2.5–4.5)
PLATELET # BLD AUTO: 490 K/UL — HIGH (ref 150–400)
POTASSIUM SERPL-MCNC: 4.1 MMOL/L — SIGNIFICANT CHANGE UP (ref 3.5–5.3)
POTASSIUM SERPL-SCNC: 4.1 MMOL/L — SIGNIFICANT CHANGE UP (ref 3.5–5.3)
PROT SERPL-MCNC: 6.7 G/DL — SIGNIFICANT CHANGE UP (ref 6–8.3)
RBC # BLD: 2.9 M/UL — LOW (ref 4.2–5.8)
RBC # FLD: 14.3 % — SIGNIFICANT CHANGE UP (ref 10.3–14.5)
SODIUM SERPL-SCNC: 136 MMOL/L — SIGNIFICANT CHANGE UP (ref 135–145)
WBC # BLD: 7.98 K/UL — SIGNIFICANT CHANGE UP (ref 3.8–10.5)
WBC # FLD AUTO: 7.98 K/UL — SIGNIFICANT CHANGE UP (ref 3.8–10.5)

## 2022-07-17 PROCEDURE — 99232 SBSQ HOSP IP/OBS MODERATE 35: CPT

## 2022-07-17 RX ADMIN — Medication 1 PATCH: at 13:19

## 2022-07-17 RX ADMIN — Medication 1 PATCH: at 06:35

## 2022-07-17 RX ADMIN — Medication 1 PATCH: at 20:00

## 2022-07-17 RX ADMIN — Medication 1 TABLET(S): at 12:39

## 2022-07-17 RX ADMIN — Medication 1 PATCH: at 12:38

## 2022-07-17 RX ADMIN — Medication 100 MILLIGRAM(S): at 12:39

## 2022-07-17 RX ADMIN — ENOXAPARIN SODIUM 30 MILLIGRAM(S): 100 INJECTION SUBCUTANEOUS at 17:57

## 2022-07-17 RX ADMIN — Medication 1 MILLIGRAM(S): at 12:39

## 2022-07-17 RX ADMIN — PANTOPRAZOLE SODIUM 40 MILLIGRAM(S): 20 TABLET, DELAYED RELEASE ORAL at 06:43

## 2022-07-17 NOTE — PROGRESS NOTE ADULT - PROBLEM SELECTOR PLAN 1
Patient continued to spike low grade fever while being treated with Zosyn (Day 5). 7/11/22: 100.7 around noon and 99.4 on repeat at 1:30pm.  Afebrile since 7/12/22. Tmax overnight 98.4. Patient is currently asymptomatic except for occasional cough.  BCx 7/6 NEG, 7/10 NEG  UA negative 7/11  Lactate 1.6 7/11  CXR 7/11 clear lungs  7/13: CT Chest revealed 3cm necrotic central lymph node concerning for TB. Patient moved to isolation  7/14: afebrile. ID consulted, plan for AFB x 3, sputum culture, Histo urine Ag, Coccidioides Ab, Blasto Ab  7/15: Tmax 100. AFB x2 negative, Histo, Coccidiodes, Blasto pending.   7/16: Tmax 98.4, AFB x 3 negative, Sputum culture normal abiel. Histo, Coccidio, Blaso still pending.    Fever likely from the necrotic central lymph node vs ETOH withdrawal.  Records from [facility] received on 7/15: last CT chest without necrotic lymph node. Likely a new finding.    - f/u Histo urine Ag  - f/u Coccidiodes Ab  - f/u Blasto Ab  - Monitor temperature off Zosyn  - ID recommendations appreciated for biopsy -s/p zosyn for aspiration pna   -Negative BCx negative x 3  -UA negative   -CXR clear lungs   -CT Chest revealed 3cm necrotic central lymph node concerning for TB. Patient moved to isolation. Records from [facility] received on 7/15: last CT chest without necrotic lymph node. Likely a new finding.   -ID following rec AFB x 3, sputum culture, Histo urine Ag, Coccidioides Ab, Blasto Ab  - f/u Histo urine Ag  - f/u Coccidiodes Ab  - f/u Blasto Ab  -monitor fever curve

## 2022-07-17 NOTE — PROGRESS NOTE ADULT - SUBJECTIVE AND OBJECTIVE BOX
***************************************************************  Haroldo Sanabria, PGY2  Internal Medicine   pager: [phone]  ***************************************************************    SARA ISBELL  51y  MRN: [id number]    Patient is a 51y old  Male who presents with a chief complaint of ETOH Withdrawal (16 Jul 2022 09:35)      Subjective: no events ON. Denies fever, CP, SOB, abn pain, N/V, dysuria. Tolerating diet.      MEDICATIONS  (STANDING):  enoxaparin Injectable 30 milliGRAM(s) SubCutaneous every 24 hours  folic acid 1 milliGRAM(s) Oral daily  multivitamin 1 Tablet(s) Oral daily  nicotine -   7 mG/24Hr(s) Patch 1 Patch Transdermal daily  pantoprazole    Tablet 40 milliGRAM(s) Oral before breakfast  thiamine 100 milliGRAM(s) Oral daily    MEDICATIONS  (PRN):  acetaminophen     Tablet .. 650 milliGRAM(s) Oral every 6 hours PRN Temp greater or equal to 38C (100.4F)      Objective:    Vitals: Vital Signs Last 24 Hrs  T(C): 37 (07-17-22 @ 06:50), Max: 37 (07-16-22 @ 22:31)  T(F): 98.6 (07-17-22 @ 06:50), Max: 98.6 (07-16-22 @ 22:31)  HR: 86 (07-17-22 @ 06:50) (77 - 93)  BP: 101/68 (07-17-22 @ 06:50) (92/60 - 111/63)  BP(mean): --  RR: 18 (07-17-22 @ 06:50) (16 - 18)  SpO2: 100% (07-17-22 @ 06:50) (100% - 100%)            I&O's Summary      PHYSICAL EXAM:  GENERAL: NAD  HEAD:  Atraumatic, Normocephalic  EYES: EOMI, conjunctiva and sclera clear  CHEST/LUNG: Clear to percussion bilaterally; No rales, rhonchi, wheezing, or rubs  HEART: Regular rate and rhythm; No murmurs, rubs, or gallops  ABDOMEN: Soft, Nontender, Nondistended;   SKIN: No rashes or lesions  NERVOUS SYSTEM:  Alert & Oriented X3, no focal deficit    LABS:  07-17    136  |  99  |  13  ----------------------------<  108<H>  4.1   |  26  |  0.66  07-16    139  |  102  |  15  ----------------------------<  116<H>  4.0   |  25  |  0.68  07-15    137  |  99  |  12  ----------------------------<  92  4.0   |  25  |  0.66    Ca    9.4      17 Jul 2022 06:00  Ca    9.0      16 Jul 2022 07:00  Ca    9.4      15 Jul 2022 05:44  Phos  3.8     07-17  Mg     1.60     07-17    TPro  6.7  /  Alb  3.6  /  TBili  0.3  /  DBili  x   /  AST  17  /  ALT  12  /  AlkPhos  101  07-17  TPro  6.6  /  Alb  3.7  /  TBili  0.4  /  DBili  x   /  AST  19  /  ALT  11  /  AlkPhos  95  07-16  TPro  7.5  /  Alb  3.4  /  TBili  0.4  /  DBili  x   /  AST  23  /  ALT  13  /  AlkPhos  93  07-15                                              9.0    7.98  )-----------( 490      ( 17 Jul 2022 06:00 )             30.0                         8.7    8.48  )-----------( 498      ( 16 Jul 2022 07:00 )             27.7                         8.5    8.69  )-----------( 514      ( 15 Jul 2022 05:44 )             27.3     CAPILLARY BLOOD GLUCOSE          RADIOLOGY & ADDITIONAL TESTS:    Imaging Personally Reviewed:  [x ] YES  [ ] NO    Consultants involved in case:   Consultant(s) Notes Reviewed:  [ x] YES  [ ] NO:   Care Discussed with Consultants/Other Providers [x ] YES  [ ] NO

## 2022-07-17 NOTE — PROGRESS NOTE ADULT - PROBLEM SELECTOR PLAN 2
RESOLVED    Patient presented w/ AMS and reported visual hallucinations as per sister concerning for alcohol-induced psychosis vs withdrawal vs DT.     CIWA discharged 7/12/22.    - Does report fall recently (~2 weeks ago), however CTH negative in ED on 7/6  - Loaded w/ 600 mg phenobarb IV & started on precedex on transfer to MICU     ---- Precedex discontinued, no further agitation/restlessness and not requiring phenobarb.  - Thiamine 500mg IVPB x 3 days, now Thiamine 100mg PO QD + Folate  - mild transaminitis; now resolved  - Due to low BP, he is on LR 75ml/hr for 12 hours  - SBIRT consulted; pt uncooperative refer to SW note  - PT consult for unsteady gait but cleared for d/c s/p MICU for precedex and phenobarb load  -Resolved  -c/w PO Thiamine 100mg PO QD + Folate  - SBIRT consulted; pt uncooperative refer to SW note  - PT consult for unsteady gait but cleared for d/c

## 2022-07-17 NOTE — PROGRESS NOTE ADULT - PROBLEM SELECTOR PLAN 3
Pt was found to have Hb 7.1 from previous Hb 2 weeks ago was 12.3.  - initially no reports of bleeding, pt denies any bloody vomiting or stools. However pt family member reported seeing blood in toilet & sink 2 days prior to admission  - started on IV PPI BID 7/6--> transition to oral  - cbc q12, transfuse Hb <7  - GI consulted, Anemia, macrocytic. Hgb stable. Patient denies hematochezia, melena. Given concomitant thrombocytopenia, likely in setting of chronic alcohol use  - no plan for GI intervention at this time  - H/H monitoring Pt was found to have Hb 7.1 from previous Hb 2 weeks ago was 12.3.  - initially no reports of bleeding, pt denies any bloody vomiting or stools. However pt family member reported seeing blood in toilet & sink 2 days prior to admission  - started on IV PPI BID 7/6--> transition to oral  - GI consulted, Anemia, macrocytic. Hgb stable. Patient denies hematochezia, melena. Given concomitant thrombocytopenia, likely in setting of chronic alcohol use  - no plan for GI intervention at this time  -cbc daily, transfuse Hb <7

## 2022-07-18 LAB
ALBUMIN SERPL ELPH-MCNC: 3.4 G/DL — SIGNIFICANT CHANGE UP (ref 3.3–5)
ALP SERPL-CCNC: 92 U/L — SIGNIFICANT CHANGE UP (ref 40–120)
ALT FLD-CCNC: 12 U/L — SIGNIFICANT CHANGE UP (ref 4–41)
ANION GAP SERPL CALC-SCNC: 12 MMOL/L — SIGNIFICANT CHANGE UP (ref 7–14)
AST SERPL-CCNC: 20 U/L — SIGNIFICANT CHANGE UP (ref 4–40)
BILIRUB SERPL-MCNC: 0.3 MG/DL — SIGNIFICANT CHANGE UP (ref 0.2–1.2)
BUN SERPL-MCNC: 13 MG/DL — SIGNIFICANT CHANGE UP (ref 7–23)
CALCIUM SERPL-MCNC: 9.6 MG/DL — SIGNIFICANT CHANGE UP (ref 8.4–10.5)
CHLORIDE SERPL-SCNC: 98 MMOL/L — SIGNIFICANT CHANGE UP (ref 98–107)
CO2 SERPL-SCNC: 28 MMOL/L — SIGNIFICANT CHANGE UP (ref 22–31)
CREAT SERPL-MCNC: 0.62 MG/DL — SIGNIFICANT CHANGE UP (ref 0.5–1.3)
EGFR: 117 ML/MIN/1.73M2 — SIGNIFICANT CHANGE UP
GLUCOSE SERPL-MCNC: 105 MG/DL — HIGH (ref 70–99)
HCT VFR BLD CALC: 28.9 % — LOW (ref 39–50)
HGB BLD-MCNC: 9.2 G/DL — LOW (ref 13–17)
MAGNESIUM SERPL-MCNC: 1.6 MG/DL — SIGNIFICANT CHANGE UP (ref 1.6–2.6)
MCHC RBC-ENTMCNC: 31.7 PG — SIGNIFICANT CHANGE UP (ref 27–34)
MCHC RBC-ENTMCNC: 31.8 GM/DL — LOW (ref 32–36)
MCV RBC AUTO: 99.7 FL — SIGNIFICANT CHANGE UP (ref 80–100)
NRBC # BLD: 0 /100 WBCS — SIGNIFICANT CHANGE UP
NRBC # FLD: 0 K/UL — SIGNIFICANT CHANGE UP
PHOSPHATE SERPL-MCNC: 4.4 MG/DL — SIGNIFICANT CHANGE UP (ref 2.5–4.5)
PLATELET # BLD AUTO: 478 K/UL — HIGH (ref 150–400)
POTASSIUM SERPL-MCNC: 3.9 MMOL/L — SIGNIFICANT CHANGE UP (ref 3.5–5.3)
POTASSIUM SERPL-SCNC: 3.9 MMOL/L — SIGNIFICANT CHANGE UP (ref 3.5–5.3)
PROT SERPL-MCNC: 7.5 G/DL — SIGNIFICANT CHANGE UP (ref 6–8.3)
RBC # BLD: 2.9 M/UL — LOW (ref 4.2–5.8)
RBC # FLD: 14.2 % — SIGNIFICANT CHANGE UP (ref 10.3–14.5)
SODIUM SERPL-SCNC: 138 MMOL/L — SIGNIFICANT CHANGE UP (ref 135–145)
WBC # BLD: 7.48 K/UL — SIGNIFICANT CHANGE UP (ref 3.8–10.5)
WBC # FLD AUTO: 7.48 K/UL — SIGNIFICANT CHANGE UP (ref 3.8–10.5)

## 2022-07-18 PROCEDURE — 99232 SBSQ HOSP IP/OBS MODERATE 35: CPT

## 2022-07-18 PROCEDURE — 99222 1ST HOSP IP/OBS MODERATE 55: CPT | Mod: GC

## 2022-07-18 RX ADMIN — ENOXAPARIN SODIUM 30 MILLIGRAM(S): 100 INJECTION SUBCUTANEOUS at 17:08

## 2022-07-18 RX ADMIN — Medication 1 PATCH: at 06:57

## 2022-07-18 RX ADMIN — Medication 1 TABLET(S): at 11:58

## 2022-07-18 RX ADMIN — PANTOPRAZOLE SODIUM 40 MILLIGRAM(S): 20 TABLET, DELAYED RELEASE ORAL at 06:34

## 2022-07-18 RX ADMIN — Medication 1 PATCH: at 11:58

## 2022-07-18 RX ADMIN — Medication 1 PATCH: at 11:50

## 2022-07-18 RX ADMIN — Medication 1 MILLIGRAM(S): at 11:58

## 2022-07-18 RX ADMIN — Medication 100 MILLIGRAM(S): at 11:58

## 2022-07-18 NOTE — PROGRESS NOTE ADULT - PROBLEM SELECTOR PLAN 1
-s/p zosyn for aspiration pna   -Negative BCx negative x 3  -UA negative   -CXR clear lungs   -CT Chest revealed 3cm necrotic central lymph node concerning for TB. Patient moved to isolation. Records from [facility] received on 7/15: last CT chest without necrotic lymph node. Likely a new finding.   -ID following rec AFB x 3, sputum culture, Histo urine Ag, Coccidioides Ab, Blasto Ab  - f/u Histo urine Ag  - f/u Coccidiodes Ab  - f/u Blasto Ab  -monitor fever curve s/p zosyn for aspiration pna   Negative BCx negative x 3  UA negative   CXR clear lungs   CT Chest revealed 3cm necrotic central lymph node concerning for TB. Patient moved to isolation. Records from [facility] received on 7/15: last CT chest without necrotic lymph node. Likely a new finding.   ID following, AFB x3 neg, sputum Cx neg, pending histo, coccidioides, and blasto. Per ID, keep pt on airborne isolation.   7/18  Pulm consulted regarding need for biopsy of the necrotic node and bronchoscopy.  - Reach out to [facility] to confirm TB treatment  - Pulm recs appreciated  - f/u Histo urine Ag  - f/u Coccidiodes Ab  - f/u Blasto Ab  - monitor fever curve s/p zosyn for aspiration pna   Negative BCx negative x 3, UA negative, CXR clear lungs   CT Chest revealed 3cm necrotic central lymph node concerning for TB. Patient moved to isolation. Records from [facility] received on 7/15: last CT chest without necrotic lymph node. Likely a new finding.   Confirmed with [facility] that pt completed his TB treatment: RIPE from 3/13/18-5/31/2018, INH and rifampin 6/1/18-3/6/19 iso cavitary TB and non-improving clinical status.     ID following, AFB x3 neg, sputum Cx neg, pending histo, coccidioides, and blasto. Per ID, keep pt on airborne isolation.   7/18  Pulm consulted regarding need for biopsy of the necrotic node and bronchoscopy.  - Reach out to [facility] to confirm TB treatment  - Pulm recs appreciated  - f/u Histo urine Ag  - f/u Coccidiodes Ab  - f/u Blasto Ab  - monitor fever curve

## 2022-07-18 NOTE — CONSULT NOTE ADULT - SUBJECTIVE AND OBJECTIVE BOX
Pulmonary Consult     Dewey Weinstein MD  PGY1 Internal Medicine     HPI: 51 year old man PMH significant for prior TB infection s/p treatment in 2018 (per OSH/Pilar record RIPE from 3/2018 to 5/2018 and rifampin and isoniazid from 6/2018 to 3/2019) and alcohol use disorder who initially presented with AMS, was admitted to MICU for phenobarbital load and started on short course precedex gtt. Became less agitated and confused during course with low grade fevers for which cultures were sent and negative. Following transfer to floor patient still had low grade fevers, was recultured and had CT chest done on 7/13 which demonstrated centrally located necrotic prevascular lymph node. Patient prior to hospitalization had denied cough, night sweats, chills, fevers, but did endorse weight loss, although he attributed it to not eating well, instead "placing alcohol and cigarettes in front of himself". In hospital now he has had occasional dry cough, but thinks it's originating from his throat. 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 Patient has had three previously negative AFB smears this admission. Pulmonary consulted for the necrotic lymph node and possible bronchoscopy.    PAST MEDICAL & SURGICAL HISTORY:   - Alcohol use disorder  - Prior TB treatment in 2018    - Patient denied surgical history      FAMILY HISTORY: Patient denied history of cancers, but otherwise did not know of any history      SOCIAL HISTORY:  Smoking: 3/4 packs x 15 years  EtOH Use: 1 to 2 pints per day  Occupation: Ex-   Exposures: None as per patient  Recent Travel: None      Allergies    No Known Allergies      REVIEW OF SYSTEMS:  Constitutional: No fevers or chills. + weight loss. No fatigue or generalized malaise.  Eyes: No itching or discharge from the eyes  ENT: No ear pain. No ear discharge. No nasal congestion. No post nasal drip. No epistaxis. No throat pain. No sore throat. No difficulty swallowing.   CV: No chest pain. No palpitations. No lightheadedness or dizziness.   Resp: No dyspnea at rest. No dyspnea on exertion. No orthopnea. No wheezing. + dry cough. No stridor. No sputum production. No chest pain with respiration.  GI: No nausea. No vomiting. No diarrhea.  MSK: No joint pain or pain in any extremities  Integumentary: No skin lesions. No pedal edema.  Neurological: No gross motor weakness. No sensory changes.    [X] All other systems negative  [ ] Unable to assess ROS because ________    OBJECTIVE:  ICU Vital Signs Last 24 Hrs  T(C): 36.8 (18 Jul 2022 14:45), Max: 36.9 (18 Jul 2022 06:30)  T(F): 98.2 (18 Jul 2022 14:45), Max: 98.4 (18 Jul 2022 06:30)  HR: 116 (18 Jul 2022 14:45) (82 - 116)  BP: 101/76 (18 Jul 2022 14:45) (101/76 - 102/74)  RR: 17 (18 Jul 2022 14:45) (14 - 18)  SpO2: 100% (18 Jul 2022 14:45) (100% - 100%)    O2 Parameters below as of 18 Jul 2022 14:45  Patient On (Oxygen Delivery Method): room air      CAPILLARY BLOOD GLUCOSE      PHYSICAL EXAM:  General: Thin appearing man, awake, alert, oriented X 3  HEENT: Atraumatic, normocephalic, no tonsillar or pharyngeal exudates  Lymph Nodes: No palpable lymphadenopathy  Neck: No JVD, no carotid bruit.   Respiratory: Normal chest expansion, b/l breath sounds present, no wheeze, rhonchi or rales  Cardiovascular: S1 S2 normal, no murmurs, rubs or gallops  Abdomen: Soft, non-tender, non-distended, no organomegaly  Extremities: Warm to touch, peripheral pulse palpable, no pedal edema   Skin: No rashes or skin lesions  Neurological: Non-focal  Psychiatry: Appropriate mood and affect    HOSPITAL MEDICATIONS:  MEDICATIONS  (STANDING):  enoxaparin Injectable 30 milliGRAM(s) SubCutaneous every 24 hours  folic acid 1 milliGRAM(s) Oral daily  multivitamin 1 Tablet(s) Oral daily  nicotine -   7 mG/24Hr(s) Patch 1 Patch Transdermal daily  pantoprazole    Tablet 40 milliGRAM(s) Oral before breakfast  thiamine 100 milliGRAM(s) Oral daily    MEDICATIONS  (PRN):  acetaminophen     Tablet .. 650 milliGRAM(s) Oral every 6 hours PRN Temp greater or equal to 38C (100.4F)      LABS:                        9.2    7.48  )-----------( 478      ( 18 Jul 2022 05:55 )             28.9     07-18    138  |  98  |  13  ----------------------------<  105<H>  3.9   |  28  |  0.62    Ca    9.6      18 Jul 2022 05:55  Phos  4.4     07-18  Mg     1.60     07-18    TPro  7.5  /  Alb  3.4  /  TBili  0.3  /  DBili  x   /  AST  20  /  ALT  12  /  AlkPhos  92  07-18      MICROBIOLOGY:     Culture - Acid Fast - Sputum w/Smear . (07.15.22 @ 11:52)   Specimen Source: .Sputum Sputum   Acid Fast Bacilli Smear:   No acid fast bacilli seen by fluorochrome stain     Culture - Acid Fast - Sputum w/Smear . (07.14.22 @ 11:30)   Specimen Source: .Sputum Sputum   Acid Fast Bacilli Smear:   No acid fast bacilli seen by fluorochrome stain     Culture - Acid Fast - Sputum w/Smear (07.13.22 @ 18:49)   Specimen Source: .Sputum Sputum   Acid Fast Bacilli Smear:   No acid fast bacilli seen by fluorochrome stain     Respiratory Viral Panel with COVID-19 by REBECCA (07.12.22 @ 11:20)   Rapid RVP Result: NotDetec     Culture - Blood (07.11.22 @ 17:00)   Specimen Source: .Blood Blood-Peripheral   Culture Results:   No Growth Final     RADIOLOGY:  < from: CT Abdomen and Pelvis w/ IV Cont (07.13.22 @ 13:07) >  ACC: [id number] EXAM:  CT CHEST IC                        ACC: [id number] EXAM:  CT ABDOMEN AND PELVIS IC                          PROCEDURE DATE:  07/13/2022          INTERPRETATION:  CLINICAL INFORMATION: Alcohol abuse, recurrent fevers,   elevated inflammatory markers, concern for infection. Assess for   cirrhosis.    COMPARISON: None.    CONTRAST/COMPLICATIONS:  IV Contrast: Omnipaque 350  90 cc administered   0 cc discarded  Oral Contrast: NONE  Complications: None reported at time of study completion    PROCEDURE:  CT of the Chest, Abdomen and Pelvis was performed.  Sagittal and coronal reformats were performed.    FINDINGS:  CHEST:  LUNGS AND LARGE AIRWAYS: Patent central airways. Nonspecific   subcentimeter bilateral pulmonary nodules with largest in the right upper   lobe measuring 1.0 cm (3:166). Few regions of pulmonary scarring in the   left upper lobe. Central patches of groundglass opacities in the right   lung.  PLEURA: No pleural effusion.  VESSELS: Atherosclerotic changes of the aorta and coronary arteries.  HEART: Heart size is normal. No pericardial effusion.  MEDIASTINUM AND MANDI: Prevascular 3.0 x 1.5 cm centrally low attenuating   structure, likely represents necrotic lymph node, with smaller adjacent   similar density, likely of similar etiology. Calcified mediastinal lymph   node.  CHEST WALL AND LOWER NECK: Within normal limits.    ABDOMEN AND PELVIS:  LIVER: Nodularity along the posterior aspect of the right hepatic lobe.   Steatosis. Linear high attenuation along the right and middle hepatic   veins, may represent focal fibrosis. No focal hepatic lesions.  BILE DUCTS: Normal caliber.  GALLBLADDER: Contracted.  SPLEEN: Within normal limits.  PANCREAS: Within normal limits.  ADRENALS: Within normal limits.  KIDNEYS/URETERS: Within normal limits.    BLADDER: Diffuse mural thickening.  REPRODUCTIVE ORGANS: Prostate within normal limits.    BOWEL: Colonic diverticulosis without diverticulitis. No bowel   obstruction. Appendix is normal.  PERITONEUM: No ascites.  VESSELS: Perigastric and splenic varices. Atherosclerotic changes.  RETROPERITONEUM/LYMPH NODES: No lymphadenopathy.  ABDOMINAL WALL: Within normal limits.  BONES: Subacute left 12th rib displaced fracture. Old right rib fractures.    IMPRESSION:  Central patches of groundglass opacities in the right lung, concerning   for pneumonia.    Prevascular 3.0 cm centrally low attenuating structure, likely represents   necrotic lymph node.    Subtle hepatic nodularity and perigastric/splenic varices, raises concern   for cirrhosis.    --- End of Report ---    < end of copied text >

## 2022-07-18 NOTE — PROGRESS NOTE ADULT - ASSESSMENT
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 with RIPE for 2 months, then rifampin and INH for 10 months  Initial AFB sputum was positive for JAVIER as well  QuantiFeron positive 2018, AFB sputum negative 3/2018 and 6/2018  He was hospitalized for 7 weeks in [facility] and followed up in [facility]  CT chest in 2019 from [facility] showed subcentimeter bilateral axillary LN and paratracheal LN, 1cm RUL nodule did not demonstrate cavitation and 1.6 x 2.4 cm YOLANDA opacity, improved from 2018    At this time it is unclear what the necrotic lymph node represent  Fever could be secondary to alcohol withdrawal, otherwise no signs of active bacterial infection  Could this be chronic granulomatous LN from previous TB infection? Endemic fungi infection from developing [place]? Malignancy from smoking?  It is reasonable to rule out active TB again and check serologies to rule out endemic fungal infection  Will need IR or Pulm biopsy      IMPRESSION:  Necrotic lymph node  Hx pulmonary TB s/p treatment in 2018  Fever  Alcohol abuse    RECOMMENDATIONS:  - Maintain airborne isolation for now  - Monitor off antibiotics for now  - Follow up AFB culture (smear negative x 3)  - Follow up urine histo Ag, coccidioides Ab, Blasto Ab  - IR biopsy  - Trend WBC, fever curve, transaminases, creatinine daily  - Will continue to follow      * THIS IS AN INCOMPLETE NOTE. 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 with direct observed treatment with RIPE for 2 months, then rifampin and INH for 10 months  Initial AFB sputum was positive for JAVIER as well  QuantiFeron positive 2018, AFB sputum negative 3/2018 and 6/2018  He was hospitalized for 7 weeks in [facility] and followed up in [facility]  CT chest in 2019 from [facility] showed subcentimeter bilateral axillary LN and paratracheal LN, 1cm RUL nodule did not demonstrate cavitation and 1.6 x 2.4 cm YOLANDA opacity, improved from 2018    At this time it is unclear what the necrotic lymph node represent  Fever could be secondary to alcohol withdrawal, otherwise no signs of active bacterial infection  Could this be chronic granulomatous LN from previous TB infection? Endemic fungi infection from developing [place]? Malignancy from smoking?  It is reasonable to rule out active TB again and check serologies to rule out endemic fungal infection  Pulm consult to comment on necrotic LN and the need for bronchoscopy      IMPRESSION:  Necrotic lymph node  Hx pulmonary TB s/p treatment in 2018  Fever  Alcohol abuse    RECOMMENDATIONS:  - Maintain airborne isolation for now  - Monitor off antibiotics for now  - Follow up AFB culture (smear negative x 3)  - Follow up urine histo Ag, coccidioides Ab, Blasto Ab  - Pulm consult to comment on necrotic LN and the need for bronchoscopy  - Trend WBC, fever curve, transaminases, creatinine daily  - Will continue to follow      Patient is seen and examined with attending and case is discussed with primary team    Gricel Mera D.O.  PGY-5 Infectious Diseases Fellow  Please contact me via page or text through Microsoft Teams  If after 5PM or on weekends, please call [phone]     Time Dose Fractionation (Optional- Include Units If Applicable): 93

## 2022-07-18 NOTE — CONSULT NOTE ADULT - ASSESSMENT
51 year old man PMH prior TB infection s/p treatment and alcohol use disorder who presented with AMS, now improved, found to have centrally located prevascular necrotic lymph node on CT chest with concern for new TB infection versus possible malignancy.    #Necrotic lymph node  - New finding on CT, not present on prior OSH CT scan  - Patient with prior TB treatment back in 2018 with subsequent negative AFB smears  - AFB smears so far this admission have been negative  - In setting of weight loss likely to think it represents possible malignancy    Recommendations:  - Given prevascular location of lymph node would not perform bronchoscopy for biopsy  - BAL possible, but less likely to be helpful in setting of three prior negative AFB smears  - Would consult IR for possible guided transthoracic biopsy       ***Recommendations not final until attested to by attending***

## 2022-07-18 NOTE — PROGRESS NOTE ADULT - SUBJECTIVE AND OBJECTIVE BOX
PROGRESS NOTE:   Authored by Dr. Lauren Montague    Patient is a 51y old  Male who presents with a chief complaint of ETOH Withdrawal (17 Jul 2022 07:43)      SUBJECTIVE / OVERNIGHT EVENTS:    ADDITIONAL REVIEW OF SYSTEMS:    MEDICATIONS  (STANDING):  enoxaparin Injectable 30 milliGRAM(s) SubCutaneous every 24 hours  folic acid 1 milliGRAM(s) Oral daily  multivitamin 1 Tablet(s) Oral daily  nicotine -   7 mG/24Hr(s) Patch 1 Patch Transdermal daily  pantoprazole    Tablet 40 milliGRAM(s) Oral before breakfast  thiamine 100 milliGRAM(s) Oral daily    MEDICATIONS  (PRN):  acetaminophen     Tablet .. 650 milliGRAM(s) Oral every 6 hours PRN Temp greater or equal to 38C (100.4F)      CAPILLARY BLOOD GLUCOSE        I&O's Summary      PHYSICAL EXAM:  Vital Signs Last 24 Hrs  T(C): 36.9 (18 Jul 2022 06:30), Max: 37.2 (17 Jul 2022 14:00)  T(F): 98.4 (18 Jul 2022 06:30), Max: 98.9 (17 Jul 2022 14:00)  HR: 97 (18 Jul 2022 06:30) (78 - 97)  BP: 102/74 (18 Jul 2022 06:30) (98/68 - 102/74)  BP(mean): --  RR: 18 (18 Jul 2022 06:30) (14 - 18)  SpO2: 100% (18 Jul 2022 06:30) (100% - 100%)    Parameters below as of 18 Jul 2022 06:30  Patient On (Oxygen Delivery Method): room air        GENERAL: NAD, lying comfortably in bed  HEAD: Atraumatic, normocephalic  EYES: EOMI b/l PERRLA b/l, conjunctiva and sclera clear  NECK: Supple, No JVD, No LAD  RESPIRATORY: Normal respiratory effort; lungs are clear to auscultation bilaterally  CARDIOVASCULAR: Regular rate and rhythm, normal S1 and S2, no murmur/rub/gallop; No lower extremity edema  ABDOMEN: Nontender, normoactive bowel sounds, no rebound/guarding; No hepatosplenomegaly  MUSCULOSKELETAL: no clubbing or cyanosis of digits; no joint swelling or tenderness to palpation  NEURO: Non focal   PSYCH: A+O to person, place, and time; affect appropriate    LABS:                          9.2    7.48  )-----------( 478      ( 18 Jul 2022 05:55 )             28.9     07-18    138  |  98  |  13  ----------------------------<  105<H>  3.9   |  28  |  0.62    Ca    9.6      18 Jul 2022 05:55  Phos  4.4     07-18  Mg     1.60     07-18    TPro  7.5  /  Alb  3.4  /  TBili  0.3  /  DBili  x   /  AST  20  /  ALT  12  /  AlkPhos  92  07-18              Culture - Acid Fast - Sputum w/Smear (collected 15 Jul 2022 11:52)  Source: .Sputum Sputum       PROGRESS NOTE:   Authored by Dr. Dylan Verdugo    Patient is a 51y old  Male who presents with a chief complaint of ETOH Withdrawal (17 Jul 2022 07:43)      SUBJECTIVE / OVERNIGHT EVENTS: No events overnight. Continues to endorse the nonproductive intermittent cough with throat itchiness. Denies fever, chill, SOB, or hemoptysis.    ADDITIONAL REVIEW OF SYSTEMS:  CONSTITUTIONAL: No fever or fatigue  EYES: No visual disturbances   ENMT:  + throat itchiness; No difficulty hearing; No tthroat pain  RESPIRATORY: +dry intermittent cough, No chills or hemoptysis; No shortness of breath  CARDIOVASCULAR: No chest pain, palpitations or leg swelling  GASTROINTESTINAL: No abdominal pain. No nausea, vomiting; No diarrhea or constipation.   GENITOURINARY: No dysuria or incontinence  NEUROLOGICAL: No headaches  SKIN: No itching, rashes, or lesions   ENDOCRINE: No heat or cold intolerance   MUSCULOSKELETAL: No joint pain or swelling   ALLERGY AND IMMUNOLOGIC: No hives or eczema    MEDICATIONS  (STANDING):  enoxaparin Injectable 30 milliGRAM(s) SubCutaneous every 24 hours  folic acid 1 milliGRAM(s) Oral daily  multivitamin 1 Tablet(s) Oral daily  nicotine -   7 mG/24Hr(s) Patch 1 Patch Transdermal daily  pantoprazole    Tablet 40 milliGRAM(s) Oral before breakfast  thiamine 100 milliGRAM(s) Oral daily    MEDICATIONS  (PRN):  acetaminophen     Tablet .. 650 milliGRAM(s) Oral every 6 hours PRN Temp greater or equal to 38C (100.4F)      CAPILLARY BLOOD GLUCOSE        I&O's Summary      PHYSICAL EXAM:  Vital Signs Last 24 Hrs  T(C): 36.9 (18 Jul 2022 06:30), Max: 37.2 (17 Jul 2022 14:00)  T(F): 98.4 (18 Jul 2022 06:30), Max: 98.9 (17 Jul 2022 14:00)  HR: 97 (18 Jul 2022 06:30) (78 - 97)  BP: 102/74 (18 Jul 2022 06:30) (98/68 - 102/74)  BP(mean): --  RR: 18 (18 Jul 2022 06:30) (14 - 18)  SpO2: 100% (18 Jul 2022 06:30) (100% - 100%)    Parameters below as of 18 Jul 2022 06:30  Patient On (Oxygen Delivery Method): room air        GENERAL: NAD, lying comfortably in bed  HEAD: Atraumatic, normocephalic  EYES: EOMI b/l, conjunctiva and sclera clear  NECK: Supple   RESPIRATORY: Normal respiratory effort; lungs are clear to auscultation bilaterally  CARDIOVASCULAR: Regular rate and rhythm, normal S1 and S2, no murmur/rub/gallop; No lower extremity edema  ABDOMEN: Nontender, no rebound/guarding   MUSCULOSKELETAL: no joint swelling or tenderness to palpation  NEURO: Non focal   PSYCH: A+O to person, place, and time; affect appropriate    LABS:                          9.2    7.48  )-----------( 478      ( 18 Jul 2022 05:55 )             28.9     07-18    138  |  98  |  13  ----------------------------<  105<H>  3.9   |  28  |  0.62    Ca    9.6      18 Jul 2022 05:55  Phos  4.4     07-18  Mg     1.60     07-18    TPro  7.5  /  Alb  3.4  /  TBili  0.3  /  DBili  x   /  AST  20  /  ALT  12  /  AlkPhos  92  07-18              Culture - Acid Fast - Sputum w/Smear (collected 15 Jul 2022 11:52)  Source: .Sputum Sputum

## 2022-07-18 NOTE — CONSULT NOTE ADULT - ATTENDING COMMENTS
50 year old with a history of alcohol use presents with change in mental status thought to be at risk for withdrawal.  Has had intermittent fever.    1) Fever   DDx is broad  Alcohol withdrawal is on ddx  Treated fro aspiration pneumonia  Other cultures without growth    2) Necrotic lymph node  Seen on CT chest    Request prior imaging from [facility] to compare    If no comparison is available, may want to consider lymph node biopsy    Check sputum for afb times three     Check urine histoplasma  Check blastomycosis, coccidiomycosis serology    3) H/o Tb  Treated for 11 months which is atypical   I would get records from [facility] re prior treatment history for TB
55 minutes spent on total encounter; more than 50% of the visit was spent counseling and / or coordinating care by the attending physician.  The necessity of the time spent during the encounter on this date of service was due to:     clinical eval, review labs, discuss with patient/ team.     Pt is a 50 yo M w/ PMHx alcohol use disorder, admitted to MICU for AMS in setting of recent alcohol cessation. GI consulted for drop in Hgb from patient's baseline near 12 to 7.5.    Impression  #Acute encephalopathy: on phenobarb and precedex prn for possible alcohol withdrawal, management per MICU team  #Anemia: drop in Hgb from 12 in June to 7.5. Per sister, bright red blood seen in patient's toilet bowl at home    Recommendations  #Anemia, lower GIB vs brisk upper GIB, although UGIB less likely given hemodynamically stable and no BMs since admission  - trend CBC, CMP, INR  - 2 large bore IVs, keep active T&S  - protonix 40 mg IV bid  - transfuse for Hgb<7  - Will hold off on a colonoscopy for now considering patient is bedbound and will require prolonged days of bowel prep. Please consider a CT-A.  - An endoscopy could be added with colonoscopy to exclude both rectal and esophageal varices.
52 yo M with h/o ETOH abuse presents to ED with AMS, found to be in alcohol withdrawal with elevated CIWA 11. Pt s/p Ativan 2mg IVP x2.    high dose CIWA vs 10mg/kg phenobarb load  contant observation  Banana bag daily  replete electrolytes  monitor for seizures  reconsult as needed
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 He reports weight loss (used to be 135 pounds), drinks alcohol daily at home more than he eats, doesn't follow up with PCP. s/p zosyn for possible aspiration pna on admission when he had altered mental status. Found to have prevascular necrotic LN 3 cm x 1.5 cm - concern for Tb vs malignancy. sputum AFB negative x 3; Rec consulting IR for LN biopsy which is not reachable with bronchoscopy.

## 2022-07-18 NOTE — PROGRESS NOTE ADULT - PROBLEM SELECTOR PLAN 3
Pt was found to have Hb 7.1 from previous Hb 2 weeks ago was 12.3.  - initially no reports of bleeding, pt denies any bloody vomiting or stools. However pt family member reported seeing blood in toilet & sink 2 days prior to admission  - started on IV PPI BID 7/6--> transition to oral  - GI consulted, Anemia, macrocytic. Hgb stable. Patient denies hematochezia, melena. Given concomitant thrombocytopenia, likely in setting of chronic alcohol use  - no plan for GI intervention at this time  -cbc daily, transfuse Hb <7 Pt was found to have Hb 7.1 from previous Hb 2 weeks ago was 12.3.  - initially no reports of bleeding, pt denies any bloody vomiting or stools. However pt family member reported seeing blood in toilet & sink 2 days prior to admission  - started on IV PPI BID 7/6--> transition to oral  - GI consulted, Anemia, macrocytic. Hgb stable. Patient denies hematochezia, melena. Given concomitant thrombocytopenia, likely in setting of chronic alcohol use  - no plan for GI intervention at this time  - cbc daily, transfuse Hb <7

## 2022-07-18 NOTE — CHART NOTE - NSCHARTNOTEFT_GEN_A_CORE
Reached out to [facility] [phone] regarding record of prior TB treatment and received a call back from Dr. Mora.     Patient received all this treatment in [facility] and was only there for repeat sputum samples in June 2018.    [facility] record indicated that the patient initially had persistently positive AFB sputum culture that grew both TB and Mycobacterium avium. There were 10 positive culture of JAVIER. However the repeated sputum cultures in 6/2018 were all negative for AFB.     He received RIPE from 3/13/2018 to 5/31/2018, then continued rifampin and INH from 6/1/2018 to 3/6/2019. Dr. 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 Unclear if he also received JAVIER treatment or not.       Gricel Mera D.O.  PGY-5 Infectious Diseases Fellow  Please contact me via page or text through [employer] Teams  If after 5PM or on weekends, please call [phone] Reached out to [facility] [phone] regarding record of prior TB treatment and received a call back from Dr. Mora.     Patient received all this treatment in [facility] and was only there for repeat sputum samples in June 2018.    [facility] record indicated that the patient initially had persistently positive AFB sputum culture that grew both TB and Mycobacterium avium. 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 There were 10 positive cultures of JAVIER. However the repeated sputum cultures in 6/2018 were all negative for AFB.     He received RIPE from 3/13/2018 to 5/31/2018, then continued rifampin and INH from 6/1/2018 to 3/6/2019. Dr. 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 Unclear if he also received JAVIER treatment or not.       Gricel Mera D.O.  PGY-5 Infectious Diseases Fellow  Please contact me via page or text through [employer] Teams  If after 5PM or on weekends, please call [phone]

## 2022-07-18 NOTE — PROGRESS NOTE ADULT - PROBLEM SELECTOR PLAN 2
s/p MICU for precedex and phenobarb load  -Resolved  -c/w PO Thiamine 100mg PO QD + Folate  - SBIRT consulted; pt uncooperative refer to SW note  - PT consult for unsteady gait but cleared for d/c RESOLVED  s/p MICU for precedex and phenobarb load    -c/w PO Thiamine 100mg PO QD + Folate  - SBIRT consulted; pt uncooperative refer to SW note  - PT consult for unsteady gait but cleared for d/c

## 2022-07-18 NOTE — PROGRESS NOTE ADULT - SUBJECTIVE AND OBJECTIVE BOX
INFECTIOUS DISEASE FOLLOW UP NOTE:    Interval History/ROS: Patient is a 51y old  Male who presents with a chief complaint of ETOH Withdrawal (18 Jul 2022 08:09)      Overnight events:    REVIEW OF SYSTEMS:  CONSTITUTIONAL: No fever or chills  HEENT: No sore throat  RESPIRATORY: No cough, no shortness of breath  CARDIOVASCULAR: No chest pain or palpitations  GASTROINTESTINAL: No abdominal or epigastric pain  GENITOURINARY: No dysuria  NEUROLOGICAL: No headache/dizziness  MSK: No joint pain, erythema, or swelling; no back pain  SKIN: No itching, rashes  All other ROS negative except noted above    Prior hospital charts reviewed [Yes]  Primary team notes reviewed [Yes]  Other consultant notes reviewed [Yes]    Allergies:  No Known Allergies      ANTIMICROBIALS:       OTHER MEDS: MEDICATIONS  (STANDING):  acetaminophen     Tablet .. 650 every 6 hours PRN  enoxaparin Injectable 30 every 24 hours  pantoprazole    Tablet 40 before breakfast      Vital Signs Last 24 Hrs  T(F): 98.4 (07-18-22 @ 06:30), Max: 100.7 (07-11-22 @ 12:00)    Vital Signs Last 24 Hrs  HR: 97 (07-18-22 @ 06:30) (78 - 97)  BP: 102/74 (07-18-22 @ 06:30) (98/68 - 102/74)  RR: 18 (07-18-22 @ 06:30)  SpO2: 100% (07-18-22 @ 06:30) (100% - 100%)  Wt(kg): --    EXAM:  GENERAL: NAD, lying in bed comfortably  HEAD: No head lesions  EYES: Conjunctiva pink and cornea white  ENT: Normal external ears and nose, no discharges; moist mucous membranes  NECK: Supple, nontender to palpation  CHEST/LUNG: Clear to auscultation bilaterally  HEART: S1 S2  ABDOMEN: Soft, nontender, nondistended; normoactive bowel sounds  EXTREMITIES: No clubbing, cyanosis, or petal edema  NERVOUS SYSTEM: Alert and oriented to person, time, place and situation, speech clear. No focal deficits   MSK: No joint erythema, swelling or pain  SKIN: No rashes or lesions, no superficial thrombophlebitis    Labs:                        9.2    7.48  )-----------( 478      ( 18 Jul 2022 05:55 )             28.9     07-18    138  |  98  |  13  ----------------------------<  105<H>  3.9   |  28  |  0.62    Ca    9.6      18 Jul 2022 05:55  Phos  4.4     07-18  Mg     1.60     07-18    TPro  7.5  /  Alb  3.4  /  TBili  0.3  /  DBili  x   /  AST  20  /  ALT  12  /  AlkPhos  92  07-18      WBC Trend:  WBC Count: 7.48 (07-18-22 @ 05:55)  WBC Count: 7.98 (07-17-22 @ 06:00)  WBC Count: 8.48 (07-16-22 @ 07:00)  WBC Count: 8.69 (07-15-22 @ 05:44)      Creatine Trend:  Creatinine, Serum: 0.62 (07-18)  Creatinine, Serum: 0.66 (07-17)  Creatinine, Serum: 0.68 (07-16)  Creatinine, Serum: 0.66 (07-15)      Liver Biochemical Testing Trend:  Alanine Aminotransferase (ALT/SGPT): 12 (07-18)  Alanine Aminotransferase (ALT/SGPT): 12 (07-17)  Alanine Aminotransferase (ALT/SGPT): 11 (07-16)  Alanine Aminotransferase (ALT/SGPT): 13 (07-15)  Alanine Aminotransferase (ALT/SGPT): 11 (07-14)  Aspartate Aminotransferase (AST/SGOT): 20 (07-18-22 @ 05:55)  Aspartate Aminotransferase (AST/SGOT): 17 (07-17-22 @ 06:00)  Aspartate Aminotransferase (AST/SGOT): 19 (07-16-22 @ 07:00)  Aspartate Aminotransferase (AST/SGOT): 23 (07-15-22 @ 05:44)  Aspartate Aminotransferase (AST/SGOT): 19 (07-14-22 @ 07:38)  Bilirubin Total, Serum: 0.3 (07-18)  Bilirubin Total, Serum: 0.3 (07-17)  Bilirubin Total, Serum: 0.4 (07-16)  Bilirubin Total, Serum: 0.4 (07-15)  Bilirubin Total, Serum: 0.4 (07-14)      Trend LDH  07-12-22 @ 05:46  237<H>          MICROBIOLOGY:        Culture - Acid Fast - Sputum w/Smear (collected 15 Jul 2022 11:52)  Source: .Sputum Sputum    Culture - Sputum (collected 14 Jul 2022 15:55)  Source: .Sputum Sputum  Final Report:    Normal Respiratory Altagracia present    Culture - Acid Fast - Sputum w/Smear (collected 14 Jul 2022 11:30)  Source: .Sputum Sputum  Preliminary Report:    Culture is being performed.    Culture - Acid Fast - Sputum w/Smear (collected 13 Jul 2022 18:49)  Source: .Sputum Sputum  Preliminary Report:    Culture is being performed.    Culture - Blood (collected 11 Jul 2022 17:00)  Source: .Blood Blood-Peripheral  Final Report:    No Growth Final    Culture - Blood (collected 11 Jul 2022 17:00)  Source: .Blood Blood-Peripheral  Final Report:    No Growth Final    Culture - Blood (collected 10 Jul 2022 05:55)  Source: .Blood Blood-Peripheral  Final Report:    No Growth Final    Culture - Blood (collected 10 Jul 2022 05:40)  Source: .Blood Blood-Peripheral  Final Report:    No Growth Final    Culture - Blood (collected 06 Jul 2022 20:59)  Source: .Blood Blood-Peripheral  Final Report:    No Growth Final    Culture - Blood (collected 06 Jul 2022 20:59)  Source: .Blood Blood-Peripheral  Final Report:    No Growth Final      HIV-1/2 Combo Result: Nonreact (07-06-22 @ 06:54)      Rapid RVP Result: NotDetec (07-12 @ 11:20)    COVID-19 PCR: NotDetec (07-10-22 @ 10:00)  COVID-19 PCR: NotDetec (07-06-22 @ 03:27)        C-Reactive Protein, Serum: 35.2 (07-12)        Lactate Dehydrogenase, Serum: 237 (07-12)            RADIOLOGY:  imaging below personally reviewed    < from: CT Chest w/ IV Cont (07.13.22 @ 13:06) >  IMPRESSION:  Central patches of groundglass opacities in the right lung, concerning   for pneumonia.    Prevascular 3.0 cm centrally low attenuating structure, likely represents   necrotic lymph node.    Subtle hepatic nodularity and perigastric/splenic varices, raises concern   for cirrhosis.    < end of copied text >   INFECTIOUS DISEASE FOLLOW UP NOTE:    Interval History/ROS: Patient is a 51y old  Male who presents with a chief complaint of ETOH Withdrawal (18 Jul 2022 08:09)    Overnight events: Patient remains afebrile and hemodynamically stable overnight. No shortness of breath; still has slight cough.     REVIEW OF SYSTEMS:  CONSTITUTIONAL: No fever or chills  HEENT: No sore throat  RESPIRATORY: No cough, no shortness of breath  CARDIOVASCULAR: No chest pain or palpitations  GASTROINTESTINAL: No abdominal or epigastric pain  GENITOURINARY: No dysuria  NEUROLOGICAL: No headache/dizziness  MSK: No joint pain, erythema, or swelling; no back pain  SKIN: No itching, rashes  All other ROS negative except noted above    Prior hospital charts reviewed [Yes]  Primary team notes reviewed [Yes]  Other consultant notes reviewed [Yes]    Allergies:  No Known Allergies      ANTIMICROBIALS:       OTHER MEDS: MEDICATIONS  (STANDING):  acetaminophen     Tablet .. 650 every 6 hours PRN  enoxaparin Injectable 30 every 24 hours  pantoprazole    Tablet 40 before breakfast      Vital Signs Last 24 Hrs  T(F): 98.4 (07-18-22 @ 06:30), Max: 100.7 (07-11-22 @ 12:00)    Vital Signs Last 24 Hrs  HR: 97 (07-18-22 @ 06:30) (78 - 97)  BP: 102/74 (07-18-22 @ 06:30) (98/68 - 102/74)  RR: 18 (07-18-22 @ 06:30)  SpO2: 100% (07-18-22 @ 06:30) (100% - 100%)  Wt(kg): --    EXAM:  GENERAL: NAD, lying in bed comfortably  HEAD: No head lesions  EYES: Conjunctiva pink and cornea white  ENT: Normal external ears and nose, no discharges; moist mucous membranes; no oral lesions  NECK: Supple, nontender to palpation  CHEST/LUNG: Clear to auscultation bilaterally  HEART: S1 S2, tachycardia  ABDOMEN: Soft, nontender, nondistended; normoactive bowel sounds  EXTREMITIES: No clubbing, cyanosis, or petal edema  NERVOUS SYSTEM: Alert and oriented to person, time, place and situation, speech clear. No focal deficits   MSK: No joint erythema, swelling or pain  SKIN: No rashes or lesions, no superficial thrombophlebitis    Labs:                        9.2    7.48  )-----------( 478      ( 18 Jul 2022 05:55 )             28.9     07-18    138  |  98  |  13  ----------------------------<  105<H>  3.9   |  28  |  0.62    Ca    9.6      18 Jul 2022 05:55  Phos  4.4     07-18  Mg     1.60     07-18    TPro  7.5  /  Alb  3.4  /  TBili  0.3  /  DBili  x   /  AST  20  /  ALT  12  /  AlkPhos  92  07-18      WBC Trend:  WBC Count: 7.48 (07-18-22 @ 05:55)  WBC Count: 7.98 (07-17-22 @ 06:00)  WBC Count: 8.48 (07-16-22 @ 07:00)  WBC Count: 8.69 (07-15-22 @ 05:44)      Creatine Trend:  Creatinine, Serum: 0.62 (07-18)  Creatinine, Serum: 0.66 (07-17)  Creatinine, Serum: 0.68 (07-16)  Creatinine, Serum: 0.66 (07-15)      Liver Biochemical Testing Trend:  Alanine Aminotransferase (ALT/SGPT): 12 (07-18)  Alanine Aminotransferase (ALT/SGPT): 12 (07-17)  Alanine Aminotransferase (ALT/SGPT): 11 (07-16)  Alanine Aminotransferase (ALT/SGPT): 13 (07-15)  Alanine Aminotransferase (ALT/SGPT): 11 (07-14)  Aspartate Aminotransferase (AST/SGOT): 20 (07-18-22 @ 05:55)  Aspartate Aminotransferase (AST/SGOT): 17 (07-17-22 @ 06:00)  Aspartate Aminotransferase (AST/SGOT): 19 (07-16-22 @ 07:00)  Aspartate Aminotransferase (AST/SGOT): 23 (07-15-22 @ 05:44)  Aspartate Aminotransferase (AST/SGOT): 19 (07-14-22 @ 07:38)  Bilirubin Total, Serum: 0.3 (07-18)  Bilirubin Total, Serum: 0.3 (07-17)  Bilirubin Total, Serum: 0.4 (07-16)  Bilirubin Total, Serum: 0.4 (07-15)  Bilirubin Total, Serum: 0.4 (07-14)      Trend LDH  07-12-22 @ 05:46  237<H>          MICROBIOLOGY:        Culture - Acid Fast - Sputum w/Smear (collected 15 Jul 2022 11:52)  Source: .Sputum Sputum    Culture - Sputum (collected 14 Jul 2022 15:55)  Source: .Sputum Sputum  Final Report:    Normal Respiratory Altagracia present    Culture - Acid Fast - Sputum w/Smear (collected 14 Jul 2022 11:30)  Source: .Sputum Sputum  Preliminary Report:    Culture is being performed.    Culture - Acid Fast - Sputum w/Smear (collected 13 Jul 2022 18:49)  Source: .Sputum Sputum  Preliminary Report:    Culture is being performed.    Culture - Blood (collected 11 Jul 2022 17:00)  Source: .Blood Blood-Peripheral  Final Report:    No Growth Final    Culture - Blood (collected 11 Jul 2022 17:00)  Source: .Blood Blood-Peripheral  Final Report:    No Growth Final    Culture - Blood (collected 10 Jul 2022 05:55)  Source: .Blood Blood-Peripheral  Final Report:    No Growth Final    Culture - Blood (collected 10 Jul 2022 05:40)  Source: .Blood Blood-Peripheral  Final Report:    No Growth Final    Culture - Blood (collected 06 Jul 2022 20:59)  Source: .Blood Blood-Peripheral  Final Report:    No Growth Final    Culture - Blood (collected 06 Jul 2022 20:59)  Source: .Blood Blood-Peripheral  Final Report:    No Growth Final      HIV-1/2 Combo Result: Nonreact (07-06-22 @ 06:54)      Rapid RVP Result: NotDetec (07-12 @ 11:20)    COVID-19 PCR: NotDetec (07-10-22 @ 10:00)  COVID-19 PCR: NotDetec (07-06-22 @ 03:27)        C-Reactive Protein, Serum: 35.2 (07-12)        Lactate Dehydrogenase, Serum: 237 (07-12)            RADIOLOGY:  imaging below personally reviewed    < from: CT Chest w/ IV Cont (07.13.22 @ 13:06) >  IMPRESSION:  Central patches of groundglass opacities in the right lung, concerning   for pneumonia.    Prevascular 3.0 cm centrally low attenuating structure, likely represents   necrotic lymph node.    Subtle hepatic nodularity and perigastric/splenic varices, raises concern   for cirrhosis.    < end of copied text >

## 2022-07-19 LAB
ANION GAP SERPL CALC-SCNC: 12 MMOL/L — SIGNIFICANT CHANGE UP (ref 7–14)
BUN SERPL-MCNC: 16 MG/DL — SIGNIFICANT CHANGE UP (ref 7–23)
CALCIUM SERPL-MCNC: 9.7 MG/DL — SIGNIFICANT CHANGE UP (ref 8.4–10.5)
CHLORIDE SERPL-SCNC: 100 MMOL/L — SIGNIFICANT CHANGE UP (ref 98–107)
CO2 SERPL-SCNC: 25 MMOL/L — SIGNIFICANT CHANGE UP (ref 22–31)
CREAT SERPL-MCNC: 0.63 MG/DL — SIGNIFICANT CHANGE UP (ref 0.5–1.3)
EGFR: 117 ML/MIN/1.73M2 — SIGNIFICANT CHANGE UP
GLUCOSE SERPL-MCNC: 108 MG/DL — HIGH (ref 70–99)
HCT VFR BLD CALC: 29.7 % — LOW (ref 39–50)
HGB BLD-MCNC: 9.1 G/DL — LOW (ref 13–17)
MAGNESIUM SERPL-MCNC: 1.6 MG/DL — SIGNIFICANT CHANGE UP (ref 1.6–2.6)
MCHC RBC-ENTMCNC: 30.6 GM/DL — LOW (ref 32–36)
MCHC RBC-ENTMCNC: 31.2 PG — SIGNIFICANT CHANGE UP (ref 27–34)
MCV RBC AUTO: 101.7 FL — HIGH (ref 80–100)
NRBC # BLD: 0 /100 WBCS — SIGNIFICANT CHANGE UP
NRBC # FLD: 0 K/UL — SIGNIFICANT CHANGE UP
PHOSPHATE SERPL-MCNC: 4.3 MG/DL — SIGNIFICANT CHANGE UP (ref 2.5–4.5)
PLATELET # BLD AUTO: 471 K/UL — HIGH (ref 150–400)
POTASSIUM SERPL-MCNC: 3.9 MMOL/L — SIGNIFICANT CHANGE UP (ref 3.5–5.3)
POTASSIUM SERPL-SCNC: 3.9 MMOL/L — SIGNIFICANT CHANGE UP (ref 3.5–5.3)
RBC # BLD: 2.92 M/UL — LOW (ref 4.2–5.8)
RBC # FLD: 14.2 % — SIGNIFICANT CHANGE UP (ref 10.3–14.5)
SODIUM SERPL-SCNC: 137 MMOL/L — SIGNIFICANT CHANGE UP (ref 135–145)
WBC # BLD: 9.1 K/UL — SIGNIFICANT CHANGE UP (ref 3.8–10.5)
WBC # FLD AUTO: 9.1 K/UL — SIGNIFICANT CHANGE UP (ref 3.8–10.5)

## 2022-07-19 PROCEDURE — 99232 SBSQ HOSP IP/OBS MODERATE 35: CPT | Mod: GC

## 2022-07-19 PROCEDURE — 99253 IP/OBS CNSLTJ NEW/EST LOW 45: CPT

## 2022-07-19 PROCEDURE — 99232 SBSQ HOSP IP/OBS MODERATE 35: CPT

## 2022-07-19 RX ADMIN — Medication 1 PATCH: at 11:37

## 2022-07-19 RX ADMIN — ENOXAPARIN SODIUM 30 MILLIGRAM(S): 100 INJECTION SUBCUTANEOUS at 17:08

## 2022-07-19 RX ADMIN — Medication 1 MILLIGRAM(S): at 11:30

## 2022-07-19 RX ADMIN — PANTOPRAZOLE SODIUM 40 MILLIGRAM(S): 20 TABLET, DELAYED RELEASE ORAL at 05:49

## 2022-07-19 RX ADMIN — Medication 1 TABLET(S): at 11:30

## 2022-07-19 RX ADMIN — Medication 1 PATCH: at 11:29

## 2022-07-19 RX ADMIN — Medication 1 PATCH: at 19:17

## 2022-07-19 RX ADMIN — Medication 100 MILLIGRAM(S): at 11:30

## 2022-07-19 NOTE — PROGRESS NOTE ADULT - SUBJECTIVE AND OBJECTIVE BOX
PROGRESS NOTE:   Authored by Dr. Dylan Verdugo    Patient is a 51y old  Male who presents with a chief complaint of ETOH Withdrawal (19 Jul 2022 09:23)      SUBJECTIVE / OVERNIGHT EVENTS: No acute events overnight. Pt states cough is becoming less frequent.     ADDITIONAL REVIEW OF SYSTEMS:  CONSTITUTIONAL: No fever   EYES: No visual disturbances   ENMT:  No difficulty hearing   RESPIRATORY: +Dry cough; No chills or shortness of breath  CARDIOVASCULAR: No chest pain, palpitations or leg swelling  GASTROINTESTINAL: No abdominal pain. No nausea, vomiting, or hematemesis; No diarrhea or constipation.   GENITOURINARY: No dysuria, frequency, hematuria   NEUROLOGICAL: No headaches, loss of strength   SKIN: No itching, rashes, or lesions   ENDOCRINE: No heat or cold intolerance   MUSCULOSKELETAL: No joint pain or swelling  HEME/LYMPH: No easy bruising, or bleeding gums  ALLERGY AND IMMUNOLOGIC: No hives or eczema    MEDICATIONS  (STANDING):  enoxaparin Injectable 30 milliGRAM(s) SubCutaneous every 24 hours  folic acid 1 milliGRAM(s) Oral daily  multivitamin 1 Tablet(s) Oral daily  nicotine -   7 mG/24Hr(s) Patch 1 Patch Transdermal daily  pantoprazole    Tablet 40 milliGRAM(s) Oral before breakfast  thiamine 100 milliGRAM(s) Oral daily    MEDICATIONS  (PRN):  acetaminophen     Tablet .. 650 milliGRAM(s) Oral every 6 hours PRN Temp greater or equal to 38C (100.4F)      CAPILLARY BLOOD GLUCOSE        I&O's Summary      PHYSICAL EXAM:  Vital Signs Last 24 Hrs  T(C): 37 (19 Jul 2022 05:45), Max: 37.6 (18 Jul 2022 21:59)  T(F): 98.6 (19 Jul 2022 05:45), Max: 99.7 (18 Jul 2022 21:59)  HR: 99 (19 Jul 2022 05:45) (90 - 116)  BP: 96/66 (19 Jul 2022 05:45) (96/66 - 104/65)  BP(mean): --  RR: 16 (19 Jul 2022 05:45) (16 - 17)  SpO2: 100% (19 Jul 2022 05:45) (100% - 100%)    Parameters below as of 19 Jul 2022 05:45  Patient On (Oxygen Delivery Method): room air        GENERAL: NAD, lying comfortably in bed  HEAD: Atraumatic, normocephalic  EYES: EOMI b/l, conjunctiva and sclera clear  NECK: Supple   RESPIRATORY: Normal respiratory effort; Trace inspiratory wheezing RLL; otherwise clear to auscultation  CARDIOVASCULAR: Regular rate and rhythm, normal S1 and S2, no murmur/rub/gallop; No lower extremity edema  ABDOMEN: Nontender, no rebound/guarding   MUSCULOSKELETAL:  no joint swelling or tenderness to palpation  NEURO: Non focal   PSYCH: A+O to person, place, and time; affect appropriate    LABS:                          9.1    9.10  )-----------( 471      ( 19 Jul 2022 06:00 )             29.7     07-19    137  |  100  |  16  ----------------------------<  108<H>  3.9   |  25  |  0.63    Ca    9.7      19 Jul 2022 06:00  Phos  4.3     07-19  Mg     1.60     07-19    TPro  7.5  /  Alb  3.4  /  TBili  0.3  /  DBili  x   /  AST  20  /  ALT  12  /  AlkPhos  92  07-18

## 2022-07-19 NOTE — PROGRESS NOTE ADULT - PROBLEM SELECTOR PLAN 1
s/p zosyn for aspiration pna   Negative BCx negative x 3, UA negative, CXR clear lungs   CT Chest revealed 3cm necrotic central lymph node concerning for TB. Patient moved to isolation. Records from Health system received on 7/15: last CT chest without necrotic lymph node. Likely a new finding.   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     ID following, AFB x3 neg, sputum Cx neg, pending histo, coccidioides, and blasto. Per ID, keep pt on airborne isolation.   7/18  Pulm consulted regarding need for biopsy of the necrotic node and bronchoscopy.  7/18 IR consult for biopsy given the necrotic node is located in front of the great vessels  - IR recs appreciated  - f/u Histo urine Ag  - f/u Coccidiodes Ab  - f/u Blasto Ab  - monitor fever curve

## 2022-07-19 NOTE — PROVIDER CONTACT NOTE (OTHER) - ACTION/TREATMENT ORDERED:
EKG ordered. Completing EKG now.
MD made aware. Will come to see the patient.
MD notified, repeated BP and HR. EKG ordered.
as per md " I'll come see him" " repeat blood pressure"
MD notified, no interventions at this time

## 2022-07-19 NOTE — PROVIDER CONTACT NOTE (OTHER) - RECOMMENDATIONS
Notify MD
MD notified.
encourage hydration, repeat vs, await md orders
Notify MD
Repeat BP and HR, notify MD

## 2022-07-19 NOTE — CONSULT NOTE ADULT - ASSESSMENT
Interventional Radiology    Evaluate for Procedure:     HPI: 51 year old man PMH prior TB infection s/p treatment and alcohol use disorder who presented with AMS, now improved, found to have centrally located prevascular necrotic lymph node on CT chest with concern for new TB infection versus possible malignancy. Per Pulmonology, not reachable by bronchoscopy. IR consulted for biopsy.     Allergies:   Medications (Abx/Cardiac/Anticoagulation/Blood Products)  enoxaparin Injectable: 30 milliGRAM(s) SubCutaneous (07-18 @ 17:08)    Data:    T(C): 37  HR: 99  BP: 96/66  RR: 16  SpO2: 100%    -WBC 9.10 / HgB 9.1 / Hct 29.7 / Plt 471  -Na 137 / Cl 100 / BUN 16 / Glucose 108  -K 3.9 / CO2 25 / Cr 0.63  -ALT -- / Alk Phos -- / T.Bili --  -INR 1.05 / PTT 26.8      Radiology:     Assessment/Plan: 50 y/o M w/ prior TB infection presents w/ AMS found to have centrally located prevascular necrotic lymph node. Not reachable by bronchoscopy per pulmonology. IR consulted for biopsy.    -- There is minimal percutaneous window for safe biopsy. Likely necrotic nature of the node would likely reduce yield from biopsy as well.   -- Recommend Thoracic Surgery consult if it is felt that biopsy is still needed. At this time, no acute intervention by IR.   -- Discussed w/ Dr. Soriano    --  Johan Thomas MD   Diagnostic Radiology Resident (PGY-3)  IR Pager: [phone]   Available by chat via Microsoft Teams     For questions about scheduling during appropriate work hours, call IR :  [phone]    For outpatient IR Booking:   [facility]: [phone]  [facility]: [phone] Interventional Radiology    Evaluate for Procedure:     HPI: 51 year old man PMH prior TB infection s/p treatment and alcohol use disorder who presented with AMS, now improved, found to have centrally located prevascular necrotic lymph node on CT chest with concern for new TB infection versus possible malignancy. Per Pulmonology, not reachable by bronchoscopy. IR consulted for biopsy.     Allergies:   Medications (Abx/Cardiac/Anticoagulation/Blood Products)  enoxaparin Injectable: 30 milliGRAM(s) SubCutaneous (07-18 @ 17:08)    Data:    T(C): 37  HR: 99  BP: 96/66  RR: 16  SpO2: 100%    -WBC 9.10 / HgB 9.1 / Hct 29.7 / Plt 471  -Na 137 / Cl 100 / BUN 16 / Glucose 108  -K 3.9 / CO2 25 / Cr 0.63  -ALT -- / Alk Phos -- / T.Bili --  -INR 1.05 / PTT 26.8      Radiology:     Assessment/Plan: 50 y/o M w/ prior TB infection presents w/ AMS found to have centrally located prevascular necrotic lymph node. Not reachable by bronchoscopy, per pulmonology. IR consulted for percutaneous biopsy.    -- There is very limited percutaneous window for safe biopsy. Necrotic nature of the node would also reduce yield from biopsy.   -- Recommend Thoracic Surgery consult, if it is felt that biopsy is still needed. At this time, no acute intervention by IR.   -- Discussed w/ Dr. Soriano    --  Johan Thomsa MD   Diagnostic Radiology Resident (PGY-3)  IR Pager: [phone]   Available by chat via Microsoft Teams     For questions about scheduling during appropriate work hours, call IR :  [phone]    For outpatient IR Booking:   [facility]: [phone]  [facility]: [phone]

## 2022-07-19 NOTE — PROGRESS NOTE ADULT - ASSESSMENT
51 year old man PMH prior TB infection s/p treatment and alcohol use disorder who presented with AMS, now improved, found to have centrally located prevascular necrotic lymph node on CT chest with concern for new TB infection versus possible malignancy.    #Necrotic lymph node  - No plan for bronchoscopy at this time  - Pending IR consult for possible transthoracic biopsy

## 2022-07-19 NOTE — CONSULT NOTE ADULT - SUBJECTIVE AND OBJECTIVE BOX
HPI:  Mr. Syeda Johnson is a 52 yo M w/ PMH alcohol use, admitted from the ED 7/6 for AMS. Collateral info from chart and pt's sister Elzbieta ([phone]).    Pt had 1 day of AMS. Confusion, visual hallucinations, anxiety. Pt called his sister claiming there was a person in his home, but no one was there. Pt also reported seeing mice which were not there. No reports of increased diaphoresis, fever, palpitations, chest pain, SOB.     Pt called 911 due to perceiving a person in his home, and was brought to the ED. Observed to be disoriented, restless, tremulous. There he was normotensive & afebrile w/ mild tachycardia to 101. UTox negative for alcohol. CT head normal. Received 2 mg ativan x2 in the ED, continued to be restless & confused. Found to have Hb 7.5 (repeat 7.8), no reported rectal bleeding or bloody vomiting. Last Hb 6/23 was 12.3.    Of note, pt was previously a  working night shifts. On 6/23, while at work, he suffered a fall and went to the ED, discharged home. After being discharged home, he received notice that he was being fired for being intoxicated at work. Per sister, he was motivated to stop drinking after losing his job and had his last drink 5 days ago.     Per sister, pt has a "drinking problem" and drinks beer & vodka (unknown # of drinks) to help him fall asleep during the day. She states that he has drank like this for ~1 year, prior to 1 year go he had lower alcohol intake.    No other known medical hx. He does not take any medications nor any substances other than alcohol & tobacco (smokes cigarettes, cigars). His sister states that he was hospitalized at [facility] ~2 years ago for "tuberculosis" but does not recall any details.   (06 Jul 2022 09:43)        Negative BCx negative x 3, UA negative, CXR clear lungs   CT Chest revealed 3cm necrotic central lymph node concerning for TB. Patient moved to isolation. Records from [facility] received on 7/15: last CT chest without necrotic lymph node. Likely a new finding.   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     ID following, AFB x3 neg, sputum Cx neg, pending histo, coccidioides, and blasto.   Per ID, keep pt on airborne isolation.       Pulm and IR consulted for biopsy but deferred to thoracic surgery.           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               REVIEW OF SYSTEMS    General:No Weight change/ Fatigue/ HA/Dizzy	    Skin/Breast: No Rashes/ Lesions/ Masses  	  Ophthalmologic: No Blurry vision/ Glaucoma/ Blindness  	  ENMT: No Hearing loss/ Drainage/ Lesions	    Respiratory and Thorax: No Cough/ Wheezing/ SOB/ Hemoptysis/ Sputum production  	  Cardiovascular: No Chest pain/ Palpitations/ Diaphoresis	    Gastrointestinal: No Nausea/ Vomiting/ Constipation/ Appetite Change	    Genitourinary: No Heamturia/ Dysuria/ Frequency change/ Impotence	    Musculoskeletal: No Pain/ Weakness/ Claudication	    Neurological: No Seizures/ TIA/CVA/ Parastesias	    Psychiatric: No Dementia/ Depression/ SI/HI	    Hematology/Lymphatics: No hx of bleeding/ Edema	    Endocrine:	No Hyperglycemia/ Hypoglycemia    Allergic/Immunologic:	 No Anaphylaxis/ Intolerance/ Recent illnesses    MEDICATIONS  (STANDING):  enoxaparin Injectable 30 milliGRAM(s) SubCutaneous every 24 hours  folic acid 1 milliGRAM(s) Oral daily  multivitamin 1 Tablet(s) Oral daily  nicotine -   7 mG/24Hr(s) Patch 1 Patch Transdermal daily  pantoprazole    Tablet 40 milliGRAM(s) Oral before breakfast  thiamine 100 milliGRAM(s) Oral daily    MEDICATIONS  (PRN):  acetaminophen     Tablet .. 650 milliGRAM(s) Oral every 6 hours PRN Temp greater or equal to 38C (100.4F)      Allergies    No Known Allergies    Intolerances        SOCIAL HISTORY:   Tobacco Screening:  · Core Measure Site	No  · Has the patient used tobacco in the past 30 days?	Unable to assess due to patient's cognitive impairment    Per sister, pt has a "drinking problem" and drinks beer & vodka (unknown # of drinks) to help him fall asleep during the day. She states that he has drank like this for ~1 year, prior to 1 year go he had lower alcohol intake.    Unemployed            FAMILY HISTORY:    unless noted, no significant family hx with Mother, Father, Siblings    Vital Signs Last 24 Hrs  T(C): 37 (19 Jul 2022 05:45), Max: 37.6 (18 Jul 2022 21:59)  T(F): 98.6 (19 Jul 2022 05:45), Max: 99.7 (18 Jul 2022 21:59)  HR: 99 (19 Jul 2022 05:45) (90 - 99)  BP: 96/66 (19 Jul 2022 05:45) (96/66 - 104/65)  BP(mean): --  RR: 16 (19 Jul 2022 05:45) (16 - 17)  SpO2: 100% (19 Jul 2022 05:45) (100% - 100%)    Parameters below as of 19 Jul 2022 05:45  Patient On (Oxygen Delivery Method): room air          PHYSICAL EXAM  General: WN/WD NAD  Neurology: Awake, nonfocal, GONSALEZ x 4  Eyes: Scleras clear, PERRLA/ EOMI, Gross vision intact  ENT:Gross hearing intact, grossly patent pharynx, no stridor  Neck: Neck supple, trachea midline, No JVD,   Respiratory: CTA B/L, No wheezing, rales, rhonchi  CV: RRR, S1S2, no murmurs, rubs or gallops  Abdominal: Soft, NT, ND +BS,   Extremities: No edema, + peripheral pulses  Skin: No Rashes, Hematoma, Ecchymosis  Lymphatic: No Neck, axilla, groin LAD  Psych: Oriented x 3, normal affect      LABS:                        9.1    9.10  )-----------( 471      ( 19 Jul 2022 06:00 )             29.7     07-19    137  |  100  |  16  ----------------------------<  108<H>  3.9   |  25  |  0.63    Ca    9.7      19 Jul 2022 06:00  Phos  4.3     07-19  Mg     1.60     07-19    TPro  7.5  /  Alb  3.4  /  TBili  0.3  /  DBili  x   /  AST  20  /  ALT  12  /  AlkPhos  92  07-18          RADIOLOGY & ADDITIONAL STUDIES:  RA< from: CT Chest w/ IV Cont (07.13.22 @ 13:06) >    ACC: [id number] EXAM:  CT CHEST IC                        ACC: [id number] EXAM:  CT ABDOMEN AND PELVIS IC                          PROCEDURE DATE:  07/13/2022          INTERPRETATION:  CLINICAL INFORMATION: Alcohol abuse, recurrent fevers,   elevated inflammatory markers, concern for infection. Assess for   cirrhosis.    COMPARISON: None.    CONTRAST/COMPLICATIONS:  IV Contrast: Omnipaque 350  90 cc administered   0 cc discarded  Oral Contrast: NONE  Complications: None reported at time of study completion    PROCEDURE:  CT of the Chest, Abdomen and Pelvis was performed.  Sagittal and coronal reformats were performed.    FINDINGS:  CHEST:  LUNGS AND LARGE AIRWAYS: Patent central airways. Nonspecific   subcentimeter bilateral pulmonary nodules with largest in the right upper   lobe measuring 1.0 cm (3:166). Few regions of pulmonary scarring in the   left upper lobe. Central patches of groundglass opacities in the right   lung.  PLEURA: No pleural effusion.  VESSELS: Atherosclerotic changes of the aorta and coronary arteries.  HEART: Heart size is normal. No pericardial effusion.  MEDIASTINUM AND MANDI: Prevascular 3.0 x 1.5 cm centrally low attenuating   structure, likely represents necrotic lymph node, with smaller adjacent   similar density, likely of similar etiology. Calcified mediastinal lymph   node.  CHEST WALL AND LOWER NECK: Within normal limits.    ABDOMEN AND PELVIS:  LIVER: Nodularity along the posterior aspect of the right hepatic lobe.   Steatosis. Linear high attenuation along the right and middle hepatic   veins, may represent focal fibrosis. No focal hepatic lesions.  BILE DUCTS: Normal caliber.  GALLBLADDER: Contracted.  SPLEEN: Within normal limits.  PANCREAS: Within normal limits.  ADRENALS: Within normal limits.  KIDNEYS/URETERS: Within normal limits.    BLADDER: Diffuse mural thickening.  REPRODUCTIVE ORGANS: Prostate within normal limits.    BOWEL: Colonic diverticulosis without diverticulitis. No bowel   obstruction. Appendix is normal.  PERITONEUM: No ascites.  VESSELS: Perigastric and splenic varices. Atherosclerotic changes.  RETROPERITONEUM/LYMPH NODES: No lymphadenopathy.  ABDOMINAL WALL: Within normal limits.  BONES: Subacute left 12th rib displaced fracture. Old right rib fractures.    IMPRESSION:  Central patches of groundglass opacities in the right lung, concerning   for pneumonia.    Prevascular 3.0 cm centrally low attenuating structure, likely represents   necrotic lymph node.    Subtle hepatic nodularity and perigastric/splenic varices, raises concern   for cirrhosis.    --- End of Report ---          VICK ROTH DO; Fellow Radiology  This document has been electronically signed.  PAOLA BRADFORD MD; Attending Radiologist  This document has been electronically signed. Jul 13 2022  3:17PM    < end of copied text >            pAST MEDICAL & SURGICAL HISTORY:  HEALTH ISSUES - PROBLEM Dx:  ETOH abuse    Abnormal hemoglobin (Hgb)    Disorder of electrolytes    Prophylactic measure    Fever    Visual hallucinations    Alcohol withdrawal        HEALTH ISSUES - R/O PROBLEM Dx: NECROTIC lymph node, request for biopsy      PLAN:  will discuss case w Dr Leonardo and continue to follow  continue care per primary medical team      Kacey BUTCHER [id number]

## 2022-07-19 NOTE — PROVIDER CONTACT NOTE (OTHER) - ASSESSMENT
alert, febrile, hypotensive
pt afebrile and asymptomatic of HR of 111
Pt asymptomatic
Temp 100.2F, , BP 90/60  Pt c/o dry throat.
pt afebrile and asymptomatic of HR of 115

## 2022-07-19 NOTE — PROVIDER CONTACT NOTE (OTHER) - SITUATION
Temp 100.2 F, , BP 90/60
Pt heart rate is 111
Pt heart rate is 115
Pt CIWa vs assessed and noted to out of parameter
Pt admitted with alcohol dependence with withdrawal

## 2022-07-19 NOTE — PROGRESS NOTE ADULT - PROBLEM SELECTOR PLAN 2
RESOLVED  s/p MICU for precedex and phenobarb load    -c/w PO Thiamine 100mg PO QD + Folate  - SBIRT consulted; pt uncooperative refer to SW note  - PT consult for unsteady gait but cleared for d/c

## 2022-07-19 NOTE — CONSULT NOTE ADULT - CONSULT REASON
Necrotic lymph node
anemia
Withdrawal
Necrotic lymph node, bronchoscopy
necrotic LN
Prevascular LN biopsy

## 2022-07-19 NOTE — PROGRESS NOTE ADULT - PROBLEM SELECTOR PLAN 3
Pt was found to have Hb 7.1 from previous Hb 2 weeks ago was 12.3.  Initially no reports of bleeding, pt denies any bloody vomiting or stools. However pt family member reported seeing blood in toilet & sink 2 days prior to admission.  Low Iron, Low TIBC, High Ferritin (800s): likely 2/2 chronic inflammation, and alcohol use (macrocytic initially)  - started on IV PPI BID 7/6--> transition to oral  - GI consulted, Anemia, macrocytic. Hgb stable. Patient denies hematochezia, melena. Given concomitant thrombocytopenia, likely in setting of chronic alcohol use  - no plan for GI intervention at this time  - cbc daily, transfuse Hb <7

## 2022-07-19 NOTE — PROGRESS NOTE ADULT - TIME BILLING
medical management, review of records/labs/test results, discussion with patient, team, documentation.

## 2022-07-19 NOTE — CONSULT NOTE ADULT - CONSULT REQUESTED DATE/TIME
06-Jul-2022 06:17
18-Jul-2022 15:32
19-Jul-2022
06-Jul-2022 17:12
19-Jul-2022 13:31
14-Jul-2022 11:53

## 2022-07-19 NOTE — PROGRESS NOTE ADULT - ASSESSMENT
51 year old with history of treated Tb (confirmed with [facility]) and new imaging finding of necrotic lymph node.    Sputum afb smear negative times three    ? if this is related to prior TB- although not seen on imaging at time of Tb diagnosis.   ? new process    Is biopsy feasible vs serial imaging

## 2022-07-19 NOTE — PROVIDER CONTACT NOTE (OTHER) - BACKGROUND
Pt with pmh of ETOH
Pt admitted for alcohol withdrawal.
Pt admitted with alcohol dependence with withdrawal
Pt with pmh of ETOH
Pt with pmh of ETOH

## 2022-07-19 NOTE — PROGRESS NOTE ADULT - SUBJECTIVE AND OBJECTIVE BOX
Pulmonary Progress Note    Dewey Weinstein MD  PGY1 Internal Medicine     Patient is a 51y old  Male who presents with a chief complaint of ETOH Withdrawal (18 Jul 2022 15:31)      SUBJECTIVE / OVERNIGHT EVENTS: Patient with no events overnight. States his breathing is ok and his cough has been less. Cough also still nonproductive. No nausea, vomiting, and appetite has still been ok. No chest pain endorsed by patient.      MEDICATIONS  (STANDING):  enoxaparin Injectable 30 milliGRAM(s) SubCutaneous every 24 hours  folic acid 1 milliGRAM(s) Oral daily  multivitamin 1 Tablet(s) Oral daily  nicotine -   7 mG/24Hr(s) Patch 1 Patch Transdermal daily  pantoprazole    Tablet 40 milliGRAM(s) Oral before breakfast  thiamine 100 milliGRAM(s) Oral daily    MEDICATIONS  (PRN):  acetaminophen     Tablet .. 650 milliGRAM(s) Oral every 6 hours PRN Temp greater or equal to 38C (100.4F)      PHYSICAL EXAM:  Vital Signs Last 24 Hrs  T(C): 37 (19 Jul 2022 05:45), Max: 37.6 (18 Jul 2022 21:59)  T(F): 98.6 (19 Jul 2022 05:45), Max: 99.7 (18 Jul 2022 21:59)  HR: 99 (19 Jul 2022 05:45) (90 - 116)  BP: 96/66 (19 Jul 2022 05:45) (96/66 - 104/65)  RR: 16 (19 Jul 2022 05:45) (16 - 17)  SpO2: 100% (19 Jul 2022 05:45) (100% - 100%)    Parameters below as of 19 Jul 2022 05:45  Patient On (Oxygen Delivery Method): room air      CONSTITUTIONAL: NAD, well-developed, well-groomed  HEENT: Moist oral mucosa, no pharyngeal injection or exudates; normal dentition  RESPIRATORY: Normal respiratory effort; Left lung clear to auscultation, right with trace inspiratory wheeze at base  CARDIOVASCULAR: Regular rate and rhythm, normal S1 and S2, no murmur/rub/gallop; No lower extremity edema; Peripheral pulses are 2+ bilaterally  ABDOMEN: Nontender to palpation, normoactive bowel sounds, no rebound/guarding; No hepatosplenomegaly  PSYCH: A+O to person, place, and time; affect appropriate  NEUROLOGY: CN 2-12 are intact and symmetric; no gross deficits   SKIN: No rashes; no palpable lesions    LABS:                        9.1    9.10  )-----------( 471      ( 19 Jul 2022 06:00 )             29.7     07-19    137  |  100  |  16  ----------------------------<  108<H>  3.9   |  25  |  0.63    Ca    9.7      19 Jul 2022 06:00  Phos  4.3     07-19  Mg     1.60     07-19    TPro  7.5  /  Alb  3.4  /  TBili  0.3  /  DBili  x   /  AST  20  /  ALT  12  /  AlkPhos  92  07-18              SARS-CoV-2: NotDetec (12 Jul 2022 11:20)  COVID-19 PCR: NotDetec (10 Jul 2022 10:00)  COVID-19 PCR: NotDetec (06 Jul 2022 03:27)      RADIOLOGY & ADDITIONAL TESTS:  Imaging from Last 24 Hours: None

## 2022-07-19 NOTE — CONSULT NOTE ADULT - NS ATTEND AMEND GEN_ALL_CORE FT
Patient seen and examined agree with above note as modified, where appropriate, by me. pt with Hx TB found to have incidental necrotic left prevascular lymph node. I feel this is likely related to his TB. Given biopsy would require surgery feel pt should have a repeat CT in 8 weeks as outpatient. If enlargement or no improvement can consider VATS and biopsy that time.

## 2022-07-19 NOTE — PROGRESS NOTE ADULT - SUBJECTIVE AND OBJECTIVE BOX
Follow Up:      Inverval History/ROS:Patient is a 51y old  Male who presents with a chief complaint of ETOH Withdrawal (19 Jul 2022 14:52)    No fever    Allergies    No Known Allergies    Intolerances        ANTIMICROBIALS:      OTHER MEDS:  acetaminophen     Tablet .. 650 milliGRAM(s) Oral every 6 hours PRN  enoxaparin Injectable 30 milliGRAM(s) SubCutaneous every 24 hours  folic acid 1 milliGRAM(s) Oral daily  multivitamin 1 Tablet(s) Oral daily  nicotine -   7 mG/24Hr(s) Patch 1 Patch Transdermal daily  pantoprazole    Tablet 40 milliGRAM(s) Oral before breakfast  thiamine 100 milliGRAM(s) Oral daily      Vital Signs Last 24 Hrs  T(C): 36.8 (19 Jul 2022 15:35), Max: 37.6 (18 Jul 2022 21:59)  T(F): 98.3 (19 Jul 2022 15:35), Max: 99.7 (18 Jul 2022 21:59)  HR: 95 (19 Jul 2022 15:35) (90 - 99)  BP: 95/61 (19 Jul 2022 15:35) (95/61 - 104/65)  BP(mean): --  RR: 17 (19 Jul 2022 15:35) (16 - 17)  SpO2: 100% (19 Jul 2022 15:35) (100% - 100%)    Parameters below as of 19 Jul 2022 15:35  Patient On (Oxygen Delivery Method): room air        PHYSICAL EXAM:  General: [ ] non-toxic  HEAD/EYES: [ ] PERRL [x ] white sclera [ ] icterus  ENT:  [ ] normal [x ] supple [ ] thrush [ ] pharyngeal exudate  Cardiovascular:   [ ] murmur [x ] normal [ ] PPM/AICD  Respiratory:  [ x] clear to ausculation bilaterally  GI:  [ ] soft, non-tender, normal bowel sounds  :  [ ] velasquez [ ] no CVA tenderness   Musculoskeletal:  [x ] no synovitis  Neurologic:  [ ] non-focal exam   Skin:  [x ] no rash  Lymph: [x ] no lymphadenopathy  Psychiatric:  [ ] appropriate affect [ ] alert & oriented  Lines:  [ x] no phlebitis [ ] central line                                9.1    9.10  )-----------( 471      ( 19 Jul 2022 06:00 )             29.7       07-19    137  |  100  |  16  ----------------------------<  108<H>  3.9   |  25  |  0.63    Ca    9.7      19 Jul 2022 06:00  Phos  4.3     07-19  Mg     1.60     07-19    TPro  7.5  /  Alb  3.4  /  TBili  0.3  /  DBili  x   /  AST  20  /  ALT  12  /  AlkPhos  92  07-18          MICROBIOLOGY:Culture Results:   Normal Respiratory Altagracia present (07-14-22 @ 15:55)  Culture Results:   Culture is being performed. (07-14-22 @ 11:30)  Culture Results:   Culture is being performed. (07-13-22 @ 18:49)      RADIOLOGY:

## 2022-07-20 ENCOUNTER — TRANSCRIPTION ENCOUNTER (OUTPATIENT)
Age: 52
End: 2022-07-20

## 2022-07-20 VITALS
HEART RATE: 94 BPM | RESPIRATION RATE: 18 BRPM | OXYGEN SATURATION: 100 % | DIASTOLIC BLOOD PRESSURE: 72 MMHG | TEMPERATURE: 98 F | SYSTOLIC BLOOD PRESSURE: 114 MMHG

## 2022-07-20 LAB — B DERMAT AB FLD QL: NEGATIVE — SIGNIFICANT CHANGE UP

## 2022-07-20 PROCEDURE — 99232 SBSQ HOSP IP/OBS MODERATE 35: CPT

## 2022-07-20 RX ORDER — THIAMINE MONONITRATE (VIT B1) 100 MG
1 TABLET ORAL
Qty: 30 | Refills: 0
Start: 2022-07-20 | End: 2022-08-18

## 2022-07-20 RX ORDER — NICOTINE POLACRILEX 2 MG
1 GUM BUCCAL
Qty: 30 | Refills: 0
Start: 2022-07-20 | End: 2022-08-18

## 2022-07-20 RX ORDER — FOLIC ACID 0.8 MG
1 TABLET ORAL
Qty: 30 | Refills: 0
Start: 2022-07-20 | End: 2022-08-18

## 2022-07-20 RX ADMIN — Medication 100 MILLIGRAM(S): at 13:06

## 2022-07-20 RX ADMIN — Medication 1 PATCH: at 12:00

## 2022-07-20 RX ADMIN — Medication 1 PATCH: at 07:36

## 2022-07-20 RX ADMIN — Medication 1 TABLET(S): at 13:06

## 2022-07-20 RX ADMIN — Medication 1 MILLIGRAM(S): at 13:11

## 2022-07-20 RX ADMIN — Medication 1 PATCH: at 13:07

## 2022-07-20 RX ADMIN — ENOXAPARIN SODIUM 30 MILLIGRAM(S): 100 INJECTION SUBCUTANEOUS at 17:37

## 2022-07-20 RX ADMIN — Medication 1 PATCH: at 18:04

## 2022-07-20 RX ADMIN — PANTOPRAZOLE SODIUM 40 MILLIGRAM(S): 20 TABLET, DELAYED RELEASE ORAL at 06:39

## 2022-07-20 NOTE — PROGRESS NOTE ADULT - SUBJECTIVE AND OBJECTIVE BOX
PROGRESS NOTE:   Authored by Dr. Dylan Verdugo    Patient is a 51y old  Male who presents with a chief complaint of ETOH Withdrawal (19 Jul 2022 17:36)      SUBJECTIVE / OVERNIGHT EVENTS: No acute events overnight.    ADDITIONAL REVIEW OF SYSTEMS:  CONSTITUTIONAL: No fever, weight loss, or fatigue  EYES: No eye pain, visual disturbances, or discharge  ENMT:  No difficulty hearing, tinnitus, vertigo; No sinus or throat pain  RESPIRATORY: No cough, wheezing, chills or hemoptysis; No shortness of breath  CARDIOVASCULAR: No chest pain, palpitations, dizziness, or leg swelling  GASTROINTESTINAL: No abdominal or epigastric pain. No nausea, vomiting, or hematemesis; No diarrhea or constipation. No melena or hematochezia.  GENITOURINARY: No dysuria, frequency, hematuria, or incontinence  NEUROLOGICAL: No headaches, loss of strength, numbness, or tremors  SKIN: No itching, burning, rashes, or lesions   LYMPH NODES: No enlarged glands  ENDOCRINE: No heat or cold intolerance; No polydipsia or polyuria  MUSCULOSKELETAL: No joint pain or swelling;   PSYCHIATRIC: Denies depression, anxiety  HEME/LYMPH: No easy bruising, or bleeding gums  ALLERGY AND IMMUNOLOGIC: No hives or eczema    MEDICATIONS  (STANDING):  enoxaparin Injectable 30 milliGRAM(s) SubCutaneous every 24 hours  folic acid 1 milliGRAM(s) Oral daily  multivitamin 1 Tablet(s) Oral daily  nicotine -   7 mG/24Hr(s) Patch 1 Patch Transdermal daily  pantoprazole    Tablet 40 milliGRAM(s) Oral before breakfast  thiamine 100 milliGRAM(s) Oral daily    MEDICATIONS  (PRN):  acetaminophen     Tablet .. 650 milliGRAM(s) Oral every 6 hours PRN Temp greater or equal to 38C (100.4F)      CAPILLARY BLOOD GLUCOSE        I&O's Summary      PHYSICAL EXAM:  Vital Signs Last 24 Hrs  T(C): 36.5 (20 Jul 2022 06:41), Max: 37.3 (19 Jul 2022 22:23)  T(F): 97.7 (20 Jul 2022 06:41), Max: 99.2 (19 Jul 2022 22:23)  HR: 99 (20 Jul 2022 06:41) (87 - 99)  BP: 114/81 (20 Jul 2022 06:41) (95/61 - 114/81)  BP(mean): --  RR: 18 (20 Jul 2022 06:41) (17 - 18)  SpO2: 100% (20 Jul 2022 06:41) (100% - 100%)    Parameters below as of 20 Jul 2022 06:41  Patient On (Oxygen Delivery Method): room air        GENERAL: NAD, lying comfortably in bed  HEAD: Atraumatic, normocephalic  EYES: EOMI b/l PERRLA b/l, conjunctiva and sclera clear  NECK: Supple, No JVD, No LAD  RESPIRATORY: Normal respiratory effort; lungs are clear to auscultation bilaterally  CARDIOVASCULAR: Regular rate and rhythm, normal S1 and S2, no murmur/rub/gallop; No lower extremity edema  ABDOMEN: Nontender, normoactive bowel sounds, no rebound/guarding; No hepatosplenomegaly  MUSCULOSKELETAL: no clubbing or cyanosis of digits; no joint swelling or tenderness to palpation  NEURO: Non focal   PSYCH: A+O to person, place, and time; affect appropriate    LABS:    No labs today                  9.1    9.10  )-----------( 471      ( 19 Jul 2022 06:00 )             29.7     07-19    137  |  100  |  16  ----------------------------<  108<H>  3.9   |  25  |  0.63    Ca    9.7      19 Jul 2022 06:00  Phos  4.3     07-19  Mg     1.60     07-19                 PROGRESS NOTE:   Authored by Dr. Dylan Verdugo    Patient is a 51y old  Male who presents with a chief complaint of ETOH Withdrawal (19 Jul 2022 17:36)    SUBJECTIVE / OVERNIGHT EVENTS: No acute events overnight. Pt states his cough has improved, occurring less frequently.     ADDITIONAL REVIEW OF SYSTEMS:  CONSTITUTIONAL: No fever   EYES: No visual disturbances   ENMT:  No difficulty hearing; No throat pain  RESPIRATORY: + occasional dry cough; No chills or hemoptysis; No shortness of breath  CARDIOVASCULAR: No chest pain, palpitations, or leg swelling  GASTROINTESTINAL: No abdominal pain. No nausea, vomiting, or hematemesis; No diarrhea or constipation. No melena or hematochezia.  GENITOURINARY: No hematuria or incontinence  NEUROLOGICAL: No headaches, loss of strength   SKIN: No itching, rashes, or lesions   ENDOCRINE: No heat or cold intolerance   MUSCULOSKELETAL: No joint pain or swelling  HEME/LYMPH: No easy bruising, or bleeding gums  ALLERGY AND IMMUNOLOGIC: No hives or eczema    MEDICATIONS  (STANDING):  enoxaparin Injectable 30 milliGRAM(s) SubCutaneous every 24 hours  folic acid 1 milliGRAM(s) Oral daily  multivitamin 1 Tablet(s) Oral daily  nicotine -   7 mG/24Hr(s) Patch 1 Patch Transdermal daily  pantoprazole    Tablet 40 milliGRAM(s) Oral before breakfast  thiamine 100 milliGRAM(s) Oral daily    MEDICATIONS  (PRN):  acetaminophen     Tablet .. 650 milliGRAM(s) Oral every 6 hours PRN Temp greater or equal to 38C (100.4F)      CAPILLARY BLOOD GLUCOSE        I&O's Summary      PHYSICAL EXAM:  Vital Signs Last 24 Hrs  T(C): 36.5 (20 Jul 2022 06:41), Max: 37.3 (19 Jul 2022 22:23)  T(F): 97.7 (20 Jul 2022 06:41), Max: 99.2 (19 Jul 2022 22:23)  HR: 99 (20 Jul 2022 06:41) (87 - 99)  BP: 114/81 (20 Jul 2022 06:41) (95/61 - 114/81)  BP(mean): --  RR: 18 (20 Jul 2022 06:41) (17 - 18)  SpO2: 100% (20 Jul 2022 06:41) (100% - 100%)    Parameters below as of 20 Jul 2022 06:41  Patient On (Oxygen Delivery Method): room air      GENERAL: NAD, lying comfortably in bed  HEAD: Atraumatic, normocephalic  EYES: EOMI b/l, conjunctiva and sclera clear  NECK: Supple  RESPIRATORY: Normal respiratory effort; lungs are clear to auscultation bilaterally  CARDIOVASCULAR: Regular rate and rhythm, normal S1 and S2, no murmur/rub/gallop; No lower extremity edema  ABDOMEN: Nontender, no rebound/guarding   MUSCULOSKELETAL: no joint swelling    NEURO: Non focal   PSYCH: A+O to person, place, and time; affect appropriate    LABS:    No labs today                        9.1    9.10  )-----------( 471      ( 19 Jul 2022 06:00 )             29.7     07-19    137  |  100  |  16  ----------------------------<  108<H>  3.9   |  25  |  0.63    Ca    9.7      19 Jul 2022 06:00  Phos  4.3     07-19  Mg     1.60     07-19

## 2022-07-20 NOTE — PROGRESS NOTE ADULT - REASON FOR ADMISSION
ETOH Withdrawal

## 2022-07-20 NOTE — PROGRESS NOTE ADULT - ASSESSMENT
51 year old with history of treated Tb (confirmed with [facility]) and new imaging finding of necrotic lymph node.    Sputum afb smear negative times three    ? if this is related to prior TB- although not seen on imaging at time of Tb diagnosis.   ? new process    CT surgery evaluating to see if biopsy is indicated. If not, will need serial imaging   51 year old with history of treated Tb (confirmed with [facility]) and new imaging finding of necrotic lymph node.    Sputum afb smear negative times three    ? if this is related to prior TB- although not seen on imaging at time of Tb diagnosis.   ? new process    Discussed case with CT ICU- given prior treatment of Tb and lack of symptoms at this time- Reasonable to repeat CT in 2-3 months.      can get serial imaging with his pmd or can follow up with me in 2 months [phone]    Will sign off.

## 2022-07-20 NOTE — PROGRESS NOTE ADULT - PROVIDER SPECIALTY LIST ADULT
Gastroenterology
Internal Medicine
MICU
Infectious Disease
Pulmonology
Infectious Disease
Internal Medicine

## 2022-07-20 NOTE — PROGRESS NOTE ADULT - ATTENDING COMMENTS
51M PMH of  TB ( pt reports he took 11 months of Rx with [facility])  and alcohol use, admitted for AMS, confusion, visual hallucinations, anxiety  Had low grade fever of 100.2, tachycardic overnight.     SIRS- pt met SIRS criteria on 7/6. No clear source. BC- NGTD, UA negative. Chest Xray - clear. Started on empiric Zosyn in the MICU   Had low grade fever of 100.2  7/10, will dc Abx, observe off Abx till AM.    ETOH withdrawal-  Was admitted to the MICU. s/p Precedex. s/p Phenobarb. c/w monitoring. thiamine, folate and MVi    Anemia/Thrombocytopenia- Denies hematochezia, melena. GI on board-  no plan for GI intervention at this time    Iron studies/B12 reviewed.  Monitor CBC, Transfuse if less than 7. On Protonix       Dc planning if afebrile in the AM
patient with hx of Tb, s/p treatment, now with necrotic LN concerning for malignancy. negative sputum AFB x 3. IR consulted.
25 minutes spent on total encounter; more than 50% of the visit was spent counseling and / or coordinating care by the attending physician.  The necessity of the time spent during the encounter on this date of service was due to:     clinical eval, review labs, discuss with patient/ team.     Pt is a 50 yo M w/ PMHx alcohol use disorder, admitted to MICU for AMS in setting of recent alcohol cessation. GI consulted for drop in Hgb from patient's baseline near 12 to 7.5.    Impression  #Acute encephalopathy: on phenobarb and precedex prn for possible alcohol withdrawal, management per MICU team  #Anemia: drop in Hgb from 12 in June to 7.5. Per sister, bright red blood seen in patient's toilet bowl at home    Recommendations  #Anemia, macrocytic. Hgb stable. Patient denies hematochezia, melena. Given concomitant thrombocytopenia, likely in setting of chronic alcohol use  - trend CBC, transfuse for Hgb<7   - send iron studies  - no plan for GI intervention at this time     GI will sign-off at this time. Please reconsult if needed.
51 year old with history of treated Tb (confirmed with [facility]) and new imaging finding of necrotic lymph node.    Sputum afb smear negative times three    I would ask pulmonary if a bronch and/or lymph node biopsy is feasible. If not, then serial imaging
Agree with above. Seen and examined with team on rounds. Critically ill with ETOH withdrawal syndrome. Tachycardic and shaky. Continue phenobarb prn, was loaded yesterday. Close follow up of electrolytes.
51 y/M PMH of  TB ( pt reports he took 11 months of Rx with [facility])  and alcohol use, admitted for AMS, confusion, visual hallucinations, anxiety    # ETOH withdrawal-  Was admitted to the MICU. s/p Precedex. s/p Phenobarb  Will place on symptom triggered CIWA. Scoring 1-2   continue high dose Thiamine 500mg IVPB /folate  As per pt, his last drink was on July 1st and plans to quit drinking. Denies  history of delirium  tremens, seizures    # Hypokalemia-likely secondary to ETOH. Replete  # SIRS- pt met SIRS criteria on 7/6. No clear source. BC- NGTD, UA negative. Chest Xray - clear. Started on empiric Zosyn in the MICU   Will complete 5 day course and dc  # Anemia/Thrombocytopenia- Denies hematochezia, melena. GI on board-  no plan for GI intervention at this time    Iron studies/B12 reviewed.  Monitor CBC, Transfuse if less than 7. On Protonix   # Dispo- FU PT eval
51 y/M PMH of  TB ( pt reports he took 11 months of Rx with [facility])  and alcohol use, admitted for AMS, confusion, visual hallucinations, anxiety  Had low grade fever of 100.2, tachycardic overnight.     # SIRS- pt met SIRS criteria on 7/6. No clear source. BC- NGTD, UA negative. Chest Xray - clear. Started on empiric Zosyn in the MICU   Had low grade fever of 100.2 overnight. Plan for  5- 7 day course     # ETOH withdrawal-  Was admitted to the MICU. s/p Precedex. s/p Phenobarb. Will place on symptom triggered CIWA. Scoring 1-2   continue high dose Thiamine 500mg IVPB /folate. As per pt, his last drink was on July 1st and plans to quit drinking. Denies  history of delirium  tremens, seizures    # Hypokalemia-likely secondary to ETOH. Replete      # Anemia/Thrombocytopenia- Denies hematochezia, melena. GI on board-  no plan for GI intervention at this time    Iron studies/B12 reviewed.  Monitor CBC, Transfuse if less than 7. On Protonix     # Dispo- FU PT eval.
51M PMH of  TB ( pt reports he took 11 months of Rx with [facility])  and alcohol use, admitted for AMS, confusion, visual hallucinations, anxiety    No fever or chills. Intermittent chills    SIRS- pt meets SIRS criteria tachycardia, fever, No clear source of infection. BC- NGTD, UA negative. Chest Xray - clear. S/p 5 days of Abx,    CT chest shows PNA, likely radiological lag, however pt has necrotic LN on CT, given hx of TB, c/w isolation, f/u AFB sputum. Will f/u ID rec. RVP neg. Improved HR with IVF. May need to verify with [facility] if pt truly complete TB meds. Will obtain [facility] records ( forms faxed, awaiting reply)    ETOH withdrawal-  Was admitted to the MICU. s/p Precedex. s/p Phenobarb. c/w monitoring. thiamine, folate and MVi    Anemia/Thrombocytopenia- Denies hematochezia, melena. GI on board-  no plan for GI intervention at this time    Iron studies/B12 reviewed.  Monitor CBC, Transfuse if less than 7. On Protonix .
51M PMH of  TB ( pt reports he took 11 months of Rx with [facility])  and alcohol use, admitted for AMS, confusion, visual hallucinations, anxiety    +Fever per   Pt endorses cough, no fever, chills palpitation.    SIRS- pt meets SIRS criteria tachycardia, fever, No clear source of infection. BC- NGTD, UA negative. Chest Xray - clear. S/p 5 days of Abx,    CT chest shows PNA, likely radiological lag, however pt has necrotic LN on CT, given hx of TB, will isolate pt, check AFB sputum. Will f/u ID. RVP neg. Improved HR with IVF. May need to verify with [facility] if pt truly complete TB meds.     ETOH withdrawal-  Was admitted to the MICU. s/p Precedex. s/p Phenobarb. c/w monitoring. thiamine, folate and MVi    Anemia/Thrombocytopenia- Denies hematochezia, melena. GI on board-  no plan for GI intervention at this time    Iron studies/B12 reviewed.  Monitor CBC, Transfuse if less than 7. On Protonix       Dc pending sputum Cx
Patient seen and examined, care plan discussed with house staff as above.    Mr. Johnson is a pleasant 50 yo M w/ PMH of TB (s/p RIPE treatment over 5 years ago) and alcohol use, admitted from the ED 7/6 for AMS, s/p CIWA treatment. CT chest notable for GGO and necrotic lymph node. Repeat AFB sputum cultures x 3 negative thus far. ID consulted. S/p course of 5 days of Zosyn for presumed CAP. Awaiting results for atypical infections (blasto, histo, coccidiodes).     ID rec's discussing necrotic lymph node w Pulm for consideration for inpatient bronchoscopy--> not accessible via bronch as per Interventional Pulm--> IR consulted, rec's Thoracic Surgery consult for biopsy.     Says he is feeling overall better. Prior to admission he couldn't even walk from the bed to the bathroom due to severe weakness, and now he can. Afebrile overnight.
Patient seen and examined, care plan discussed with house staff as above.    Mr. Johnson is a pleasant 52 yo M w/ PMH of TB (s/p RIPE treatment over 5 years ago) and alcohol use, admitted from the ED 7/6 for AMS, s/p CIWA treatment. CT chest notable for GGO and necrotic lymph node. Repeat AFB sputum cultures x 3 negative thus far. ID consulted. S/p course of 5 days of Zosyn for presumed CAP. Awaiting results for atypical infections (blasto, histo, coccidiodes). ID consulted.    Says  he is feeling overall better. Prior to admission he couldn't even walk from the bed to the bathroom due to severe weakness, and now he can. Afebrile overnight.
Patient seen and examined, care plan discussed with house staff as above.    Mr. Johnson is a pleasant 52 yo M w/ PMH of TB (s/p RIPE treatment over 5 years ago) and alcohol use, admitted from the ED 7/6 for AMS, s/p CIWA treatment. CT chest notable for GGO and necrotic lymph node. Repeat AFB sputum cultures x 3 negative thus far. ID consulted. S/p course of 5 days of Zosyn for presumed CAP. Awaiting results for atypical infections (blasto, histo, coccidiodes).     ID rec's discussing necrotic lymph node w Pulm for consideration for inpatient bronchoscopy--> not accessible via bronch as per Interventional Pulm--> IR consulted, rec's Thoracic Surgery consult for biopsy.     Says he is feeling overall better. Prior to admission he couldn't even walk from the bed to the bathroom due to severe weakness, and now he can. Afebrile overnight.
51M PMH of  TB ( pt reports he took 11 months of Rx with [facility])  and alcohol use, admitted for AMS, confusion, visual hallucinations, anxiety    Pt endorses cough, no fever, chills palpitation.    SIRS- pt meets SIRS criteria tachycardia, fever, No clear source of infection. BC- NGTD, UA negative. Chest Xray - clear. S/p 5 days of Abx,    CT chest shows PNA, likely radiological lag, however pt has necrotic LN on CT, given hx of TB, c/w isolation, f/u AFB sputum. Will f/u ID rec. RVP neg. Improved HR with IVF. May need to verify with [facility] if pt truly complete TB meds. Will obtain [facility] records    ETOH withdrawal-  Was admitted to the MICU. s/p Precedex. s/p Phenobarb. c/w monitoring. thiamine, folate and MVi    Anemia/Thrombocytopenia- Denies hematochezia, melena. GI on board-  no plan for GI intervention at this time    Iron studies/B12 reviewed.  Monitor CBC, Transfuse if less than 7. On Protonix
Patient seen and examined, care plan discussed with house staff as above.    Mr. Johnson is a pleasant 52 yo M w/ PMH of TB (s/p RIPE treatment over 5 years ago) and alcohol use, admitted from the ED 7/6 for AMS, s/p CIWA treatment. CT chest notable for GGO and necrotic lymph node. Repeat AFB sputum cultures x 3 negative thus far. ID consulted. S/p course of 5 days of Zosyn for presumed CAP. Awaiting results for atypical infections (blasto, histo, coccidiodes). ID consulted.    Says  he is feeling overall better. Prior to admission he couldn't even walk from the bed to the bathroom due to severe weakness, and now he can. Afebrile overnight.
51M PMH of  TB ( pt reports he took 11 months of Rx with [facility])  and alcohol use, admitted for AMS, confusion, visual hallucinations, anxiety    +Fever per signout  Pt endorses cough, no fever, chills palpitation.    SIRS- pt meets SIRS criteria tachycardia, fever, No clear source of infection. BC- NGTD, UA negative. Chest Xray - clear. S/p 5 days of Abx, pan CT, check RVP and f/u repeat Cx. IVF    ETOH withdrawal-  Was admitted to the MICU. s/p Precedex. s/p Phenobarb. c/w monitoring. thiamine, folate and MVi    Anemia/Thrombocytopenia- Denies hematochezia, melena. GI on board-  no plan for GI intervention at this time    Iron studies/B12 reviewed.  Monitor CBC, Transfuse if less than 7. On Protonix       Dc pending no fever for > 24hrs and neg CT/Cx
Patient seen and examined, care plan discussed with house staff as above.    Mr. Johnson is a pleasant 52 yo M w/ PMH of TB (s/p RIPE treatment over 5 years ago) and alcohol use, admitted from the ED 7/6 for AMS, s/p CIWA treatment. CT chest notable for GGO and necrotic lymph node. Repeat AFB sputum cultures x 3 negative thus far. ID consulted. S/p course of 5 days of Zosyn for presumed CAP. Awaiting results for atypical infections (blasto, histo, coccidiodes). ID rec's discussing necrotic lymph node w Pulm for consideration for inpatient bronchoscopy.    Says he is feeling overall better. Prior to admission he couldn't even walk from the bed to the bathroom due to severe weakness, and now he can. Afebrile overnight.
51 y/M PMH of  TB ( pt reports he took 11 months of Rx with [facility])  and alcohol use, admitted for AMS, confusion, visual hallucinations, anxiety    # ETOH withdrawal-  Was admitted to the MICU. s/p Precedex. s/p Phenobarb  Will place on symptom triggered CIWA. continue high dose Thiamine 500mg IVPB /folate  As per pt, his last drink was on July 1st and plans to quit drinking. Denies  history of delirium  tremens, seizures    # Hypokalemia-likely secondary to ETOH. Replete  # SIRS- pt met SIRS criteria on 7/6. No clear source. BC- NGTD, UA negative. Chest Xray - clear. Started on empiric Zosyn in the MICU   Will complete 5 day course and dc  # Anemia/Thrombocytopenia- Denies hematochezia, melena. GI on board-  no plan for GI intervention at this time   FU Iron studies, B12, Monitor CBC, Transfuse if less than 7. On Protonix

## 2022-07-20 NOTE — DISCHARGE NOTE NURSING/CASE MANAGEMENT/SOCIAL WORK - NSDCFUADDAPPT_GEN_ALL_CORE_FT
[ ] please repeat CT in 8 weeks of chest to assess progression of lymph node. Can f/u with Dr Leonardo to review CT scan results    [ ] follow up with Dr. Toemr Berumen in two months (call [phone] to make an appointment)    [ ] you have liver damage likely from alcohol. Please call number provided to make an appointment with hepatology.     [ ] please follow up with your primary care doctor within 1 week of discharge. If you do not have a primary care doctor, please call number provided to make an appointment.

## 2022-07-20 NOTE — PROGRESS NOTE ADULT - SUBJECTIVE AND OBJECTIVE BOX
Follow Up:      Inverval History/ROS:Patient is a 51y old  Male who presents with a chief complaint of ETOH Withdrawal (20 Jul 2022 07:29)    No fever  No events      Allergies    No Known Allergies    Intolerances        ANTIMICROBIALS:      OTHER MEDS:  acetaminophen     Tablet .. 650 milliGRAM(s) Oral every 6 hours PRN  enoxaparin Injectable 30 milliGRAM(s) SubCutaneous every 24 hours  folic acid 1 milliGRAM(s) Oral daily  multivitamin 1 Tablet(s) Oral daily  nicotine -   7 mG/24Hr(s) Patch 1 Patch Transdermal daily  pantoprazole    Tablet 40 milliGRAM(s) Oral before breakfast  thiamine 100 milliGRAM(s) Oral daily      Vital Signs Last 24 Hrs  T(C): 36.9 (20 Jul 2022 15:10), Max: 37.3 (19 Jul 2022 22:23)  T(F): 98.4 (20 Jul 2022 15:10), Max: 99.2 (19 Jul 2022 22:23)  HR: 94 (20 Jul 2022 15:10) (87 - 99)  BP: 114/72 (20 Jul 2022 15:10) (114/72 - 114/81)  BP(mean): --  RR: 18 (20 Jul 2022 15:10) (18 - 18)  SpO2: 100% (20 Jul 2022 15:10) (100% - 100%)    Parameters below as of 20 Jul 2022 15:10  Patient On (Oxygen Delivery Method): room air        PHYSICAL EXAM:  General: [ ] non-toxic  HEAD/EYES: [ ] PERRL [x ] white sclera [ ] icterus  ENT:  [ ] normal [ ] supple [ ] thrush [ ] pharyngeal exudate  Cardiovascular:   [ ] murmur [x ] normal [ ] PPM/AICD  Respiratory:  x[ ] clear to ausculation bilaterally  GI:  [x ] soft, non-tender, normal bowel sounds  :  [ ] velasquez [x ] no CVA tenderness   Musculoskeletal:  [ ] no synovitis  Neurologic:  [ ] non-focal exam   Skin:  x[ ] no rash  Lymph: [ ] no lymphadenopathy  Psychiatric:  [ ] appropriate affect [ ] alert & oriented  Lines:  [x ] no phlebitis [ ] central line                                9.1    9.10  )-----------( 471      ( 19 Jul 2022 06:00 )             29.7       07-19    137  |  100  |  16  ----------------------------<  108<H>  3.9   |  25  |  0.63    Ca    9.7      19 Jul 2022 06:00  Phos  4.3     07-19  Mg     1.60     07-19            MICROBIOLOGY:Culture Results:   Culture is being performed. (07-15-22 @ 11:52)  Culture Results:   Normal Respiratory Altagracia present (07-14-22 @ 15:55)  Culture Results:   Culture is being performed. (07-14-22 @ 11:30)  Culture Results:   Culture is being performed. (07-13-22 @ 18:49)      RADIOLOGY:

## 2022-07-20 NOTE — DISCHARGE NOTE NURSING/CASE MANAGEMENT/SOCIAL WORK - PATIENT PORTAL LINK FT
You can access the FollowMyHealth Patient Portal offered by [facility] by registering at the following website: [email or web address]. By joining [facility]’s FollowMyHealth portal, you will also be able to view your health information using other applications (apps) compatible with our system.

## 2022-07-20 NOTE — CHART NOTE - NSCHARTNOTESELECT_GEN_ALL_CORE
MICU transfer Note/Transfer Note
POCUS/Event Note
Event Note
MAR Accept Note/Event Note
Nutrition Services
TB/Event Note

## 2022-07-20 NOTE — DISCHARGE NOTE NURSING/CASE MANAGEMENT/SOCIAL WORK - NSDCPEFALRISK_GEN_ALL_CORE
For information on Fall & Injury Prevention, visit: [email or web address] OR  [email or web address] OR  https://www.cdc.gov/steadi/patient.html

## 2022-07-20 NOTE — PROGRESS NOTE ADULT - NUTRITIONAL ASSESSMENT
This patient has been assessed with a concern for Malnutrition and has been determined to have a diagnosis/diagnoses of Moderate protein-calorie malnutrition and Underweight (BMI < 19).    This patient is being managed with:   Diet Regular-  Entered: Jul 7 2022 10:42AM    
This patient has been assessed with a concern for Malnutrition and has been determined to have a diagnosis/diagnoses of Moderate protein-calorie malnutrition and Underweight (BMI < 19).    This patient is being managed with:   Diet Regular-  Supplement Feeding Modality:  Oral  Ensure Enlive Cans or Servings Per Day:  3       Frequency:  Three Times a day  Entered: Jul 15 2022  9:30PM    
This patient has been assessed with a concern for Malnutrition and has been determined to have a diagnosis/diagnoses of Moderate protein-calorie malnutrition and Underweight (BMI < 19).    This patient is being managed with:   Diet Regular-  Entered: Jul 7 2022 10:42AM    
This patient has been assessed with a concern for Malnutrition and has been determined to have a diagnosis/diagnoses of Moderate protein-calorie malnutrition and Underweight (BMI < 19).    This patient is being managed with:   Diet Regular-  Supplement Feeding Modality:  Oral  Ensure Enlive Cans or Servings Per Day:  3       Frequency:  Three Times a day  Entered: Jul 15 2022  9:30PM    
This patient has been assessed with a concern for Malnutrition and has been determined to have a diagnosis/diagnoses of Moderate protein-calorie malnutrition and Underweight (BMI < 19).    This patient is being managed with:   Diet Regular-  Entered: Jul 7 2022 10:42AM

## 2022-07-20 NOTE — CHART NOTE - NSCHARTNOTEFT_GEN_A_CORE
Pt seen and examined with Dr Leonardo.   - Necrotic lymph node likely secondary to TB.   - Pt instructed to get repeat CT in 8 weeks to assess lymph node. Can follow up with Dr Leonardo once CT is done. If enlargement or no improvement can consider VATS and biopsy that time.  - Above discussed with patient with understanding. Instructions left in discharge note.   Will sign off at this time, please call with any questions or concerns.     Svetlana Olivera, ANGELA  [id number] [FreeTextEntry1] : Dear Dr. Preethi Nash and Dr. Bianka Mendez,\par \par I had the pleasure of seeing your patient ELLIOT CRUZ in the office today.  My office note is attached.\par \par Thank you very much for allowing me to participate in the care of your patient.\par \par Sincerely,\par \par Bakari Johnson M.D., FACG, FACP\par Director, Celiac Program at [facility]  of Medicine\par [employer] at [facility] Practice Director,\par [facility] - Gastroenterology/Internal Medicine at [place]\[facility] [address]\[place] [address]\par Tel: [phone]\par Email: [email or web address]\par \par \par The attached note has been created using a voice recognition system (Dragon).  There may be some misspellings and typos.  Please call my office if you have any issues or questions.

## 2022-07-20 NOTE — PROGRESS NOTE ADULT - PROBLEM SELECTOR PLAN 1
s/p zosyn for aspiration pna   Negative BCx negative x 3, UA negative, CXR clear lungs   CT Chest revealed 3cm necrotic central lymph node concerning for TB. Patient moved to isolation. Records from [facility] received on 7/15: last CT chest without necrotic lymph node. Likely a new finding.   Confirmed with [facility] that pt completed his TB treatment: RIPE from 3/13/18-5/31/2018, INH and rifampin 6/1/18-3/6/19 iso cavitary TB and non-improving clinical status.     ID following, AFB x3 neg, sputum Cx neg, pending histo, coccidioides, and blasto. Per ID, keep pt on airborne isolation.   7/18  Pulm consulted regarding need for biopsy of the necrotic node and bronchoscopy.  7/18 IR consult for biopsy given the necrotic node is located in front of the great vessels  7/19 Thoracic surgery consulted, pending recs    - Thoracic surgery recs appreciated  - f/u Histo urine Ag  - f/u Coccidiodes Ab  - f/u Blasto Ab  - monitor fever curve s/p Zosyn for aspiration pna   Negative BCx negative x 3, UA negative, CXR clear lungs   CT Chest revealed 3cm necrotic central lymph node concerning for TB. Patient moved to isolation. Records from [facility] received on 7/15: last CT chest without necrotic lymph node. Likely a new finding.   Confirmed with [facility] that pt completed his TB treatment: RIPE from 3/13/18-5/31/2018, INH and rifampin 6/1/18-3/6/19 iso cavitary TB and non-improving clinical status.     ID following, AFB x3 neg, sputum Cx neg, pending histo, coccidioides, and blasto. Per ID, keep pt on airborne isolation.   7/18  Pulm consulted regarding need for biopsy of the necrotic node and bronchoscopy.  7/18 IR consult for biopsy given the necrotic node is located in front of the great vessels  7/19 Thoracic surgery consulted, pending recs    Blastomyces neg    - Thoracic surgery recs appreciated for biopsy  - f/u Histo urine Ag  - f/u Coccidiodes Ab  - monitor fever curve

## 2022-07-21 LAB
C IMMITIS AB FLD QL CF: ABNORMAL
C IMMITIS IGM SPEC QL IA: NEGATIVE — SIGNIFICANT CHANGE UP
COCCIDIOIDES IGG SPEC QL IA: NEGATIVE — SIGNIFICANT CHANGE UP
H CAPSUL AG SPEC-ACNC: SIGNIFICANT CHANGE UP
H CAPSUL AG UR IA-ACNC: SIGNIFICANT CHANGE UP NG/ML
H CAPSUL AG UR QL IA: SIGNIFICANT CHANGE UP

## 2022-07-25 PROBLEM — A15.9 RESPIRATORY TUBERCULOSIS UNSPECIFIED: Chronic | Status: ACTIVE | Noted: 2022-06-23

## 2022-08-09 ENCOUNTER — OUTPATIENT (OUTPATIENT)
Dept: OUTPATIENT SERVICES | Facility: HOSPITAL | Age: 52
LOS: 1 days | End: 2022-08-09
Payer: MEDICAID

## 2022-08-09 ENCOUNTER — APPOINTMENT (OUTPATIENT)
Dept: INTERNAL MEDICINE | Facility: CLINIC | Age: 52
End: 2022-08-09

## 2022-08-09 VITALS
OXYGEN SATURATION: 97 % | WEIGHT: 118 LBS | BODY MASS INDEX: 18.52 KG/M2 | DIASTOLIC BLOOD PRESSURE: 60 MMHG | HEART RATE: 117 BPM | HEIGHT: 67 IN | SYSTOLIC BLOOD PRESSURE: 120 MMHG

## 2022-08-09 VITALS
HEIGHT: 67 IN | DIASTOLIC BLOOD PRESSURE: 60 MMHG | WEIGHT: 118 LBS | HEART RATE: 117 BPM | BODY MASS INDEX: 18.52 KG/M2 | SYSTOLIC BLOOD PRESSURE: 120 MMHG | OXYGEN SATURATION: 97 %

## 2022-08-09 DIAGNOSIS — Z83.79 FAMILY HISTORY OF OTHER DISEASES OF THE DIGESTIVE SYSTEM: ICD-10-CM

## 2022-08-09 DIAGNOSIS — Z56.0 UNEMPLOYMENT, UNSPECIFIED: ICD-10-CM

## 2022-08-09 DIAGNOSIS — Z72.0 TOBACCO USE: ICD-10-CM

## 2022-08-09 DIAGNOSIS — Z86.11 PERSONAL HISTORY OF TUBERCULOSIS: ICD-10-CM

## 2022-08-09 DIAGNOSIS — Z00.00 ENCOUNTER FOR GENERAL ADULT MEDICAL EXAMINATION W/OUT ABNORMAL FINDINGS: ICD-10-CM

## 2022-08-09 DIAGNOSIS — F10.20 ALCOHOL DEPENDENCE, UNCOMPLICATED: ICD-10-CM

## 2022-08-09 DIAGNOSIS — Z00.00 ENCOUNTER FOR GENERAL ADULT MEDICAL EXAMINATION WITHOUT ABNORMAL FINDINGS: ICD-10-CM

## 2022-08-09 DIAGNOSIS — I10 ESSENTIAL (PRIMARY) HYPERTENSION: ICD-10-CM

## 2022-08-09 DIAGNOSIS — D64.9 ANEMIA, UNSPECIFIED: ICD-10-CM

## 2022-08-09 DIAGNOSIS — F10.10 ALCOHOL ABUSE, UNCOMPLICATED: ICD-10-CM

## 2022-08-09 DIAGNOSIS — Z23 ENCOUNTER FOR IMMUNIZATION: ICD-10-CM

## 2022-08-09 PROCEDURE — 99204 OFFICE O/P NEW MOD 45 MIN: CPT

## 2022-08-09 PROCEDURE — G0463: CPT

## 2022-08-09 RX ORDER — FOLIC ACID 1 MG/1
1 TABLET ORAL DAILY
Refills: 0 | Status: ACTIVE | COMMUNITY

## 2022-08-09 RX ORDER — NICOTINE 7 MG/24H
7 PATCH, EXTENDED RELEASE TRANSDERMAL DAILY
Refills: 0 | Status: ACTIVE | COMMUNITY

## 2022-08-09 SDOH — ECONOMIC STABILITY - INCOME SECURITY: UNEMPLOYMENT, UNSPECIFIED: Z56.0

## 2022-08-15 PROBLEM — D64.9 ANEMIA, UNSPECIFIED TYPE: Status: ACTIVE | Noted: 2022-08-15

## 2022-08-15 NOTE — PHYSICAL EXAM
[No Acute Distress] : no acute distress [Well Developed] : well developed [Well-Appearing] : well-appearing [Normal Sclera/Conjunctiva] : normal sclera/conjunctiva [PERRL] : pupils equal round and reactive to light [EOMI] : extraocular movements intact [Normal Oropharynx] : the oropharynx was normal [No JVD] : no jugular venous distention [No Lymphadenopathy] : no lymphadenopathy [No Respiratory Distress] : no respiratory distress  [No Accessory Muscle Use] : no accessory muscle use [Clear to Auscultation] : lungs were clear to auscultation bilaterally [Normal Rate] : normal rate  [Regular Rhythm] : with a regular rhythm [Normal S1, S2] : normal S1 and S2 [No Murmur] : no murmur heard [No Abdominal Bruit] : a ~M bruit was not heard ~T in the abdomen [Pedal Pulses Present] : the pedal pulses are present [No Edema] : there was no peripheral edema [No Extremity Clubbing/Cyanosis] : no extremity clubbing/cyanosis [Soft] : abdomen soft [Non Tender] : non-tender [Non-distended] : non-distended [No HSM] : no HSM [Normal Bowel Sounds] : normal bowel sounds [Normal Posterior Cervical Nodes] : no posterior cervical lymphadenopathy [Normal Anterior Cervical Nodes] : no anterior cervical lymphadenopathy [Grossly Normal Strength/Tone] : grossly normal strength/tone [No Rash] : no rash [Coordination Grossly Intact] : coordination grossly intact [No Focal Deficits] : no focal deficits [Normal Affect] : the affect was normal [Alert and Oriented x3] : oriented to person, place, and time [Normal Insight/Judgement] : insight and judgment were intact [de-identified] : thin-appearing [de-identified] : no scleral icterus [de-identified] : no tongue fasciculations [de-identified] : no tremors

## 2022-08-15 NOTE — COUNSELING
[Yes] : Risk of tobacco use and health benefits of smoking cessation discussed: Yes [Use of nicotine replacement therapies and other medications discussed] : Use of nicotine replacement therapies and other medications discussed [Smoking Cessation Program Referral] : Smoking Cessation Program Referral  [AUDIT-C Screening administered and reviewed] : AUDIT-C Screening administered and reviewed [Hazards of at-risk alcohol use discussed] : Hazards of at-risk alcohol use discussed [Support options provided] : Support options provided [FreeTextEntry2] : SBIRT referral

## 2022-08-15 NOTE — HEALTH RISK ASSESSMENT
[Former] : Former [Yes] : Yes [2 - 3 times a week (3 pts)] : 2 - 3  times a week (3 points) [3 or 4 (1 pt)] : 3 or 4  (1 point) [Weekly (3 pts)] : Weekly (3 points) [0] : 2) Feeling down, depressed, or hopeless: Not at all (0) [PHQ-2 Negative - No further assessment needed] : PHQ-2 Negative - No further assessment needed [Alone] : lives alone [Unemployed] : unemployed [de-identified] : on nicotine patch [YearQuit] : 2022 [Audit-CScore] : 7 [[id number]] : 0

## 2022-08-15 NOTE — ASSESSMENT
[FreeTextEntry1] : 51M w/ alcohol use disorder, recent hospitalization for alcohol withdrawal (7/6/22-7/20/22), and TB (underwent RIPE therapy 3/2018-5/2018 + prolonged rifampin and isoniazid from 6/2018-3/2019) who presents as a new patient and hospital follow-up.\par \par #alcohol use disorder\par - hx of alcohol use since the age of 18 (up to 1 bottle of vodka every other day and multiple beers daily); now only drinking 2-3 beers every 2-3 days\par - s/p recent hospitalization for alcohol withdrawal 7/6/22-7/20/22\par - counseled patient on the harms of hazardous alcohol use and resources for alcohol cessation and support groups such as AA\par - SBIRT referral\par - c/w thiamine 100mg qd, folic acid 1mg qd, and multivitamin\par - will check LFTs/CMP at followup appointment in 10 weeks\par \par #TB\par - s/p RIPE therapy 3/2018-5/2018 + prolonged rifampin and isoniazid from 6/2018-3/2019\par - found to have new prevascular necrotic lymph node on CT chest during hospitalization with AFBx3 negative\par - repeat CT chest (referral placed) and ID (Dr. Berumen) follow-up 8 weeks post-hospital discharge (~mid-September)\par - CT surgery follow-up (Dr. Leonardo) follow-up 8 weeks post-hospital discharge\par \par #anemia\par - pt. noted to have Hgb 7.5-9.0 during hospitalization 7/6/22-7/20/22 with macrocytosis; may be in the setting of nutritional deficiencies vs. chronic alcohol use vs. other underlying etiologies\par - colonoscopy to screen for colon cancer as below\par - will defer repeat blood work until followup appointment in 10 weeks\par \par #HCM\par - c/w nicotine patch for tobacco cessation\par - unclear Tdap vaccine history; will defer Tdap until followup visit\par - COVID vaccinated x 3\par - will defer routine labs (CBC, CMP, lipids, A1c) until followup appointment in 10 weeks\par - colonoscopy referral in\par - RTC in 10 weeks\par \par case d/w Dr. Schaeffer\par \par Eusebio Mccabe MD\par PGY-1

## 2022-08-15 NOTE — HISTORY OF PRESENT ILLNESS
[FreeTextEntry1] : Patient presents as a new patient and hospital follow-up [de-identified] : 51M w/ alcohol use disorder, recent hospitalization for alcohol withdrawal (7/6/22-7/20/22), and TB (underwent RIPE therapy 3/2018-5/2018 + prolonged rifampin and isoniazid from 6/2018-3/2019) who presents as a new patient and hospital follow-up. Prior to the hospitalization, the patient reports extensive drinking (1 bottle of vodka every other day, several beers/day) with associated loss of appetite and weight loss. He presented to [facility] on 7/6 with agitation, tremulousness and required MICU admission where he received phenobarbital and was started on a Precedex drip. He was also found to have hemoglobin of 7.5 on admission with reported bright red blood in sink and toilet 2 days prior to the hospital admission. He was eventually transferred to the floors and started on a CIWA taper with improvement in symptoms. During his hospitalization, patient underwent a CT of the chest due to recurrent low-grade fevers and was found to have a new prevascular necrotic lymph node that was not biopsied during his visit.\par \par Since discharge, the patient has been doing much better; reports improved appetite and energy. He states he has cut down on his drinking to eliminate any hard liquor and drinks 3 beers every 2-3 days. He denies any symptoms of withdrawal including agitation, tremors, palpitations, diaphoresis, auditory/tactile hallucinations, nausea, vomiting. Patient has also stopped smoking tobacco; states he requested nicotine patches during his hospitalization and has been using them since discharge. He reports good adherence to medications that he was discharged on. The patient currently lives alone and is unemployed. He is motivated to find new work and possibly join AA or other support groups for substance use disorders. His diet is balanced and consists of meats, vegetables, dairy, and carbohydrates. He otherwise denies fevers, chills, cough, chest pain, shortness of breath, abdominal pain, N/V/D.

## 2022-09-03 LAB
CULTURE RESULTS: SIGNIFICANT CHANGE UP
SPECIMEN SOURCE: SIGNIFICANT CHANGE UP

## 2022-09-09 ENCOUNTER — INPATIENT (INPATIENT)
Facility: HOSPITAL | Age: 52
LOS: 0 days | Discharge: LEFT BEFORE DISCHARGE | End: 2022-09-10
Attending: HOSPITALIST | Admitting: HOSPITALIST

## 2022-09-09 ENCOUNTER — NON-APPOINTMENT (OUTPATIENT)
Age: 52
End: 2022-09-09

## 2022-09-09 VITALS
HEART RATE: 99 BPM | OXYGEN SATURATION: 98 % | DIASTOLIC BLOOD PRESSURE: 89 MMHG | HEIGHT: 64 IN | RESPIRATION RATE: 20 BRPM | TEMPERATURE: 98 F | SYSTOLIC BLOOD PRESSURE: 121 MMHG

## 2022-09-09 DIAGNOSIS — R91.8 OTHER NONSPECIFIC ABNORMAL FINDING OF LUNG FIELD: ICD-10-CM

## 2022-09-09 DIAGNOSIS — F10.10 ALCOHOL ABUSE, UNCOMPLICATED: ICD-10-CM

## 2022-09-09 DIAGNOSIS — R07.9 CHEST PAIN, UNSPECIFIED: ICD-10-CM

## 2022-09-09 DIAGNOSIS — Z29.9 ENCOUNTER FOR PROPHYLACTIC MEASURES, UNSPECIFIED: ICD-10-CM

## 2022-09-09 DIAGNOSIS — F10.239 ALCOHOL DEPENDENCE WITH WITHDRAWAL, UNSPECIFIED: ICD-10-CM

## 2022-09-09 LAB
ALBUMIN SERPL ELPH-MCNC: 4.8 G/DL — SIGNIFICANT CHANGE UP (ref 3.3–5)
ALP SERPL-CCNC: 122 U/L — HIGH (ref 40–120)
ALT FLD-CCNC: 16 U/L — SIGNIFICANT CHANGE UP (ref 4–41)
ANION GAP SERPL CALC-SCNC: 16 MMOL/L — HIGH (ref 7–14)
APPEARANCE UR: CLEAR — SIGNIFICANT CHANGE UP
AST SERPL-CCNC: 35 U/L — SIGNIFICANT CHANGE UP (ref 4–40)
BASOPHILS # BLD AUTO: 0.04 K/UL — SIGNIFICANT CHANGE UP (ref 0–0.2)
BASOPHILS NFR BLD AUTO: 0.6 % — SIGNIFICANT CHANGE UP (ref 0–2)
BILIRUB SERPL-MCNC: 0.9 MG/DL — SIGNIFICANT CHANGE UP (ref 0.2–1.2)
BILIRUB UR-MCNC: NEGATIVE — SIGNIFICANT CHANGE UP
BUN SERPL-MCNC: 7 MG/DL — SIGNIFICANT CHANGE UP (ref 7–23)
CALCIUM SERPL-MCNC: 10.1 MG/DL — SIGNIFICANT CHANGE UP (ref 8.4–10.5)
CHLORIDE SERPL-SCNC: 91 MMOL/L — LOW (ref 98–107)
CO2 SERPL-SCNC: 27 MMOL/L — SIGNIFICANT CHANGE UP (ref 22–31)
COLOR SPEC: COLORLESS — SIGNIFICANT CHANGE UP
CREAT SERPL-MCNC: 0.69 MG/DL — SIGNIFICANT CHANGE UP (ref 0.5–1.3)
DIFF PNL FLD: NEGATIVE — SIGNIFICANT CHANGE UP
EGFR: 113 ML/MIN/1.73M2 — SIGNIFICANT CHANGE UP
EOSINOPHIL # BLD AUTO: 0.1 K/UL — SIGNIFICANT CHANGE UP (ref 0–0.5)
EOSINOPHIL NFR BLD AUTO: 1.6 % — SIGNIFICANT CHANGE UP (ref 0–6)
FLUAV AG NPH QL: SIGNIFICANT CHANGE UP
FLUBV AG NPH QL: SIGNIFICANT CHANGE UP
GIANT PLATELETS BLD QL SMEAR: PRESENT — SIGNIFICANT CHANGE UP
GLUCOSE SERPL-MCNC: 108 MG/DL — HIGH (ref 70–99)
GLUCOSE UR QL: NEGATIVE — SIGNIFICANT CHANGE UP
HCT VFR BLD CALC: 47.5 % — SIGNIFICANT CHANGE UP (ref 39–50)
HGB BLD-MCNC: 15.7 G/DL — SIGNIFICANT CHANGE UP (ref 13–17)
IANC: 3.4 K/UL — SIGNIFICANT CHANGE UP (ref 1.8–7.4)
IMM GRANULOCYTES NFR BLD AUTO: 0.2 % — SIGNIFICANT CHANGE UP (ref 0–1.5)
KETONES UR-MCNC: NEGATIVE — SIGNIFICANT CHANGE UP
LEUKOCYTE ESTERASE UR-ACNC: NEGATIVE — SIGNIFICANT CHANGE UP
LIDOCAIN IGE QN: 30 U/L — SIGNIFICANT CHANGE UP (ref 7–60)
LYMPHOCYTES # BLD AUTO: 1.67 K/UL — SIGNIFICANT CHANGE UP (ref 1–3.3)
LYMPHOCYTES # BLD AUTO: 26.9 % — SIGNIFICANT CHANGE UP (ref 13–44)
MAGNESIUM SERPL-MCNC: 1.6 MG/DL — SIGNIFICANT CHANGE UP (ref 1.6–2.6)
MANUAL SMEAR VERIFICATION: SIGNIFICANT CHANGE UP
MCHC RBC-ENTMCNC: 29.8 PG — SIGNIFICANT CHANGE UP (ref 27–34)
MCHC RBC-ENTMCNC: 33.1 GM/DL — SIGNIFICANT CHANGE UP (ref 32–36)
MCV RBC AUTO: 90.1 FL — SIGNIFICANT CHANGE UP (ref 80–100)
MONOCYTES # BLD AUTO: 0.98 K/UL — HIGH (ref 0–0.9)
MONOCYTES NFR BLD AUTO: 15.8 % — HIGH (ref 2–14)
NEUTROPHILS # BLD AUTO: 3.4 K/UL — SIGNIFICANT CHANGE UP (ref 1.8–7.4)
NEUTROPHILS NFR BLD AUTO: 54.9 % — SIGNIFICANT CHANGE UP (ref 43–77)
NITRITE UR-MCNC: NEGATIVE — SIGNIFICANT CHANGE UP
NRBC # BLD: 0 /100 WBCS — SIGNIFICANT CHANGE UP (ref 0–0)
NRBC # FLD: 0 K/UL — SIGNIFICANT CHANGE UP (ref 0–0)
NT-PROBNP SERPL-SCNC: 50 PG/ML — SIGNIFICANT CHANGE UP
PH UR: 6.5 — SIGNIFICANT CHANGE UP (ref 5–8)
PLAT MORPH BLD: ABNORMAL
PLATELET # BLD AUTO: 105 K/UL — LOW (ref 150–400)
PLATELET CLUMP BLD QL SMEAR: ABNORMAL
PLATELET COUNT - ESTIMATE: ABNORMAL
POTASSIUM SERPL-MCNC: 3.3 MMOL/L — LOW (ref 3.5–5.3)
POTASSIUM SERPL-SCNC: 3.3 MMOL/L — LOW (ref 3.5–5.3)
PROT SERPL-MCNC: 8 G/DL — SIGNIFICANT CHANGE UP (ref 6–8.3)
PROT UR-MCNC: NEGATIVE — SIGNIFICANT CHANGE UP
RBC # BLD: 5.27 M/UL — SIGNIFICANT CHANGE UP (ref 4.2–5.8)
RBC # FLD: 15 % — HIGH (ref 10.3–14.5)
RBC BLD AUTO: NORMAL — SIGNIFICANT CHANGE UP
RSV RNA NPH QL NAA+NON-PROBE: SIGNIFICANT CHANGE UP
SARS-COV-2 RNA SPEC QL NAA+PROBE: SIGNIFICANT CHANGE UP
SODIUM SERPL-SCNC: 134 MMOL/L — LOW (ref 135–145)
SP GR SPEC: 1 — LOW (ref 1.01–1.05)
TROPONIN T, HIGH SENSITIVITY RESULT: 9 NG/L — SIGNIFICANT CHANGE UP
TROPONIN T, HIGH SENSITIVITY RESULT: <6 NG/L — SIGNIFICANT CHANGE UP
UROBILINOGEN FLD QL: SIGNIFICANT CHANGE UP
WBC # BLD: 6.2 K/UL — SIGNIFICANT CHANGE UP (ref 3.8–10.5)
WBC # FLD AUTO: 6.2 K/UL — SIGNIFICANT CHANGE UP (ref 3.8–10.5)

## 2022-09-09 PROCEDURE — 99221 1ST HOSP IP/OBS SF/LOW 40: CPT

## 2022-09-09 PROCEDURE — 99223 1ST HOSP IP/OBS HIGH 75: CPT

## 2022-09-09 PROCEDURE — 99285 EMERGENCY DEPT VISIT HI MDM: CPT

## 2022-09-09 PROCEDURE — 71046 X-RAY EXAM CHEST 2 VIEWS: CPT | Mod: 26

## 2022-09-09 PROCEDURE — 71260 CT THORAX DX C+: CPT | Mod: 26,MA

## 2022-09-09 RX ORDER — SODIUM CHLORIDE 9 MG/ML
1000 INJECTION, SOLUTION INTRAVENOUS
Refills: 0 | Status: DISCONTINUED | OUTPATIENT
Start: 2022-09-09 | End: 2022-09-10

## 2022-09-09 RX ORDER — FOLIC ACID 0.8 MG
1 TABLET ORAL DAILY
Refills: 0 | Status: DISCONTINUED | OUTPATIENT
Start: 2022-09-11 | End: 2022-09-10

## 2022-09-09 RX ORDER — POTASSIUM CHLORIDE 20 MEQ
40 PACKET (EA) ORAL EVERY 4 HOURS
Refills: 0 | Status: COMPLETED | OUTPATIENT
Start: 2022-09-09 | End: 2022-09-09

## 2022-09-09 RX ORDER — ENOXAPARIN SODIUM 100 MG/ML
30 INJECTION SUBCUTANEOUS EVERY 24 HOURS
Refills: 0 | Status: DISCONTINUED | OUTPATIENT
Start: 2022-09-09 | End: 2022-09-09

## 2022-09-09 RX ORDER — ONDANSETRON 8 MG/1
8 TABLET, FILM COATED ORAL
Refills: 0 | Status: DISCONTINUED | OUTPATIENT
Start: 2022-09-09 | End: 2022-09-10

## 2022-09-09 RX ORDER — ASPIRIN/CALCIUM CARB/MAGNESIUM 324 MG
162 TABLET ORAL ONCE
Refills: 0 | Status: COMPLETED | OUTPATIENT
Start: 2022-09-09 | End: 2022-09-09

## 2022-09-09 RX ORDER — THIAMINE MONONITRATE (VIT B1) 100 MG
100 TABLET ORAL DAILY
Refills: 0 | Status: DISCONTINUED | OUTPATIENT
Start: 2022-09-11 | End: 2022-09-10

## 2022-09-09 RX ADMIN — Medication 40 MILLIEQUIVALENT(S): at 18:11

## 2022-09-09 RX ADMIN — Medication 50 MILLIGRAM(S): at 21:30

## 2022-09-09 RX ADMIN — Medication 50 MILLIGRAM(S): at 10:10

## 2022-09-09 RX ADMIN — Medication 40 MILLIEQUIVALENT(S): at 21:30

## 2022-09-09 RX ADMIN — SODIUM CHLORIDE 125 MILLILITER(S): 9 INJECTION, SOLUTION INTRAVENOUS at 21:30

## 2022-09-09 RX ADMIN — Medication 50 MILLIGRAM(S): at 15:48

## 2022-09-09 RX ADMIN — Medication 162 MILLIGRAM(S): at 09:26

## 2022-09-09 RX ADMIN — SODIUM CHLORIDE 125 MILLILITER(S): 9 INJECTION, SOLUTION INTRAVENOUS at 18:11

## 2022-09-09 NOTE — ED PROVIDER NOTE - CLINICAL SUMMARY MEDICAL DECISION MAKING FREE TEXT BOX
52 y/o m p/w chest pain and b/l flank pain that began this AM, intermittent. No associated sx. Vitals stable. Exam as above. Given pain woke him from sleep, will r/o acs. Will also get UA/cx. Given necrotic LN in july, will do f/u ct chest today. dispo- pending imaging and labs.

## 2022-09-09 NOTE — CONSULT NOTE ADULT - SUBJECTIVE AND OBJECTIVE BOX
HPI:  52 y/o male with PMHx sig for ETOH abuse (admitted in 2022 for withdrawal) and TB (treated in 2018) who presents to ED with sharp left sided chest pain that woke him from sleep at 1AM. Patient states he has had this type of pain on and off for a year. Pain is sharp, constant, non radiating, non reproducible, 8 out of 10 when pain is present. Also had bilateral flank pain. Pain subsided after a few minutes and recurred at 6AM. No sob, uri sx, palpitations, n/v,  f/c, night sweats. No urinary or bowel changes, hx of renal stones, testicular pain or swelling. Of note, patient found to have necrotic perivascular lymph node on CT chest in July, CT surgery and pulm recommended repeat scan in 8 wks. Repeat CT showing no change in necrotic perivascular lymph node compared to CT from July.      Vital Signs Last 24 Hrs  T(C): 36.8 (09 Sep 2022 15:54), Max: 36.9 (09 Sep 2022 08:21)  T(F): 98.2 (09 Sep 2022 15:54), Max: 98.5 (09 Sep 2022 08:21)  HR: 102 (09 Sep 2022 15:54) (73 - 102)  BP: 103/72 (09 Sep 2022 15:54) (103/72 - 121/89)  BP(mean): --  RR: 16 (09 Sep 2022 15:54) (16 - 20)  SpO2: 100% (09 Sep 2022 15:54) (98% - 100%)    Parameters below as of 09 Sep 2022 15:54  Patient On (Oxygen Delivery Method): room air     (09 Sep 2022 17:49)      PAST MEDICAL & SURGICAL HISTORY:  TB (tuberculosis)      ETOH abuse      No significant past surgical history          REVIEW OF SYSTEMS   Review of Systems:  · Negative General Symptoms	no fever; no chills  · Negative Skin Symptoms	no rash; no itching  · Negative Ophthalmologic Symptoms	no diplopia; no photophobia  · Negative ENMT Symptoms	no hearing difficulty; no ear pain  · Negative Respiratory and Thorax Symptoms	no wheezing; no dyspnea; no cough  · Cardiovascular Symptoms	chest pain  · Negative Gastrointestinal Symptoms	no nausea; no vomiting; no diarrhea; no constipation  · Negative General Genitourinary Symptoms	no hematuria  · General Genitourinary Symptoms	flank pain L; flank pain R  · Negative Musculoskeletal Symptoms	no arthralgia  · Negative Neurological Symptoms	no weakness  · Negative Psychiatric Symptoms	no suicidal ideation; no depression; no anxiety  · Endocrine	negative  · Allergic/Immunologic	negative        MEDICATIONS  (STANDING):  chlordiazePOXIDE 50 milliGRAM(s) Oral every 6 hours  chlordiazePOXIDE   Oral   multivitamin 1 Tablet(s) Oral daily  ondansetron Injectable 8 milliGRAM(s) IV Push two times a day  potassium chloride    Tablet ER 40 milliEquivalent(s) Oral every 4 hours  sodium chloride 0.9% 1000 milliLiter(s) (125 mL/Hr) IV Continuous <Continuous>    MEDICATIONS  (PRN):  chlordiazePOXIDE 50 milliGRAM(s) Oral every 1 hour PRN Symptom-triggered: each CIWA -Ar score 8 or GREATER      Allergies    No Known Allergies    Intolerances        SOCIAL HISTORY:   Social History:  · Substance use	No  · Social History (marital status, living situation, occupation, and sexual history)	Live at home, ETOH abuse, non smoker, no drugs    FAMILY HISTORY:  No pertinent family history in first degree relatives        Vital Signs Last 24 Hrs  T(C): 37.3 (09 Sep 2022 19:30), Max: 37.3 (09 Sep 2022 19:30)  T(F): 99.2 (09 Sep 2022 19:30), Max: 99.2 (09 Sep 2022 19:30)  HR: 89 (09 Sep 2022 19:30) (73 - 102)  BP: 105/66 (09 Sep 2022 19:30) (103/72 - 121/89)  BP(mean): --  RR: 16 (09 Sep 2022 19:30) (16 - 20)  SpO2: 100% (09 Sep 2022 19:30) (98% - 100%)    Parameters below as of 09 Sep 2022 19:30  Patient On (Oxygen Delivery Method): room air        General: WN/WD NAD  Neurology: Awake, nonfocal, GONSALEZ x 4  Eyes: Scleras clear, PERRLA/ EOMI, Gross vision intact  ENT:Gross hearing intact, grossly patent pharynx, no stridor  Neck: Neck supple, trachea midline, No JVD,   Respiratory: CTA B/L, No wheezing, rales, rhonchi  CV: RRR, S1S2, no murmurs, rubs or gallops  Abdominal: Soft, NT, ND +BS,   Extremities: No edema, + peripheral pulses  Skin: No Rashes, Hematoma, Ecchymosis  Lymphatic: No Neck, axilla, groin LAD  Psych: Oriented x 3, normal affect  LABS:                        15.7   6.20  )-----------( 105      ( 09 Sep 2022 09:23 )             47.5         134<L>  |  91<L>  |  7   ----------------------------<  108<H>  3.3<L>   |  27  |  0.69    Ca    10.1      09 Sep 2022 09:23  Mg     1.60         TPro  8.0  /  Alb  4.8  /  TBili  0.9  /  DBili  x   /  AST  35  /  ALT  16  /  AlkPhos  122<H>        Urinalysis Basic - ( 09 Sep 2022 09:23 )    Color: Colorless / Appearance: Clear / S.004 / pH: x  Gluc: x / Ketone: Negative  / Bili: Negative / Urobili: <2 mg/dL   Blood: x / Protein: Negative / Nitrite: Negative   Leuk Esterase: Negative / RBC: x / WBC x   Sq Epi: x / Non Sq Epi: x / Bacteria: x        RADIOLOGY & ADDITIONAL STUDIES:  CT: left upper lobe paramediastinal opacity abutting the mediastinum     ASSESSMENT:   51yMalePAST MEDICAL & SURGICAL HISTORY:  TB (tuberculosis)      ETOH abuse      No significant past surgical history      HEALTH ISSUES - PROBLEM Dx:  Chest pain    ETOH abuse    Preventive measure    Mass of upper lobe of left lung        HEALTH ISSUES - R/O PROBLEM Dx:  Chest Pain     PLAN: Medical management of chest pain   Above discussed with Dr. Pennington, recommendations to follow

## 2022-09-09 NOTE — ED ADULT NURSE REASSESSMENT NOTE - NS ED NURSE REASSESS COMMENT FT1
Patient is admitted to medicine, waiting for bed assignment. Patient denies any pain/discomfort at this time. Medications given as ordered, IV fluid infusing. Patient is calm, cooperative, non labored breathing at this time. Side rails up and safety maintained. Fall precaution in place.

## 2022-09-09 NOTE — H&P ADULT - NSHPLABSRESULTS_GEN_ALL_CORE
MEDICATIONS  (STANDING):  chlordiazePOXIDE 50 milliGRAM(s) Oral every 6 hours  chlordiazePOXIDE   Oral   enoxaparin Injectable 30 milliGRAM(s) SubCutaneous every 24 hours  multivitamin 1 Tablet(s) Oral daily  ondansetron Injectable 8 milliGRAM(s) IV Push two times a day  potassium chloride    Tablet ER 40 milliEquivalent(s) Oral every 4 hours  sodium chloride 0.9% 1000 milliLiter(s) (125 mL/Hr) IV Continuous <Continuous>    MEDICATIONS  (PRN):  chlordiazePOXIDE 50 milliGRAM(s) Oral every 1 hour PRN Symptom-triggered: each CIWA -Ar score 8 or GREATER      Vital Signs Last 24 Hrs  T(C): 36.8 (09 Sep 2022 15:54), Max: 36.9 (09 Sep 2022 08:21)  T(F): 98.2 (09 Sep 2022 15:54), Max: 98.5 (09 Sep 2022 08:21)  HR: 102 (09 Sep 2022 15:54) (73 - 102)  BP: 103/72 (09 Sep 2022 15:54) (103/72 - 121/89)  BP(mean): --  RR: 16 (09 Sep 2022 15:54) (16 - 20)  SpO2: 100% (09 Sep 2022 15:54) (98% - 100%)    Parameters below as of 09 Sep 2022 15:54  Patient On (Oxygen Delivery Method): room air      CAPILLARY BLOOD GLUCOSE        I&O's Summary                            15.7   6.20  )-----------( 105      ( 09 Sep 2022 09:23 )             47.5     09-09    134<L>  |  91<L>  |  7   ----------------------------<  108<H>  3.3<L>   |  27  |  0.69    Ca    10.1      09 Sep 2022 09:23  Mg     1.60     09-09    TPro  8.0  /  Alb  4.8  /  TBili  0.9  /  DBili  x   /  AST  35  /  ALT  16  /  AlkPhos  122<H>  09-09    ekg with sinus rhythm MEDICATIONS  (STANDING):  chlordiazePOXIDE 50 milliGRAM(s) Oral every 6 hours  chlordiazePOXIDE   Oral   enoxaparin Injectable 30 milliGRAM(s) SubCutaneous every 24 hours  multivitamin 1 Tablet(s) Oral daily  ondansetron Injectable 8 milliGRAM(s) IV Push two times a day  potassium chloride    Tablet ER 40 milliEquivalent(s) Oral every 4 hours  sodium chloride 0.9% 1000 milliLiter(s) (125 mL/Hr) IV Continuous <Continuous>    MEDICATIONS  (PRN):  chlordiazePOXIDE 50 milliGRAM(s) Oral every 1 hour PRN Symptom-triggered: each CIWA -Ar score 8 or GREATER      Vital Signs Last 24 Hrs  T(C): 36.8 (09 Sep 2022 15:54), Max: 36.9 (09 Sep 2022 08:21)  T(F): 98.2 (09 Sep 2022 15:54), Max: 98.5 (09 Sep 2022 08:21)  HR: 102 (09 Sep 2022 15:54) (73 - 102)  BP: 103/72 (09 Sep 2022 15:54) (103/72 - 121/89)  BP(mean): --  RR: 16 (09 Sep 2022 15:54) (16 - 20)  SpO2: 100% (09 Sep 2022 15:54) (98% - 100%)    Parameters below as of 09 Sep 2022 15:54  Patient On (Oxygen Delivery Method): room air      CAPILLARY BLOOD GLUCOSE        I&O's Summary                            15.7   6.20  )-----------( 105      ( 09 Sep 2022 09:23 )             47.5     09-09    134<L>  |  91<L>  |  7   ----------------------------<  108<H>  3.3<L>   |  27  |  0.69    Ca    10.1      09 Sep 2022 09:23  Mg     1.60     09-09    TPro  8.0  /  Alb  4.8  /  TBili  0.9  /  DBili  x   /  AST  35  /  ALT  16  /  AlkPhos  122<H>  09-09    ekg with sinus rhythm  hypokalemia  cxr with clear lungs

## 2022-09-09 NOTE — ED PROVIDER NOTE - NS ED ROS FT
CONSTITUTIONAL: No fevers, no chills, no lightheadedness, no dizziness  EYES: no visual changes, no eye pain  EARS: no ear drainage, no ear pain, no change in hearing  NOSE: no nasal congestion  MOUTH/THROAT: no sore throat  CV: see hpi   RESP: No SOB, no cough  GI: No n/v/d, no abd pain  : no dysuria, no hematuria, + flank pain  MSK: no back pain, no extremity pain  SKIN: no rashes  NEURO: no headache, no focal weakness, no decreased sensation/parasthesias   PSYCHIATRIC: no known mental health issues

## 2022-09-09 NOTE — ED ADULT TRIAGE NOTE - CHIEF COMPLAINT QUOTE
Pt arrives from home for CP and b/l flank pain that started last night. Pt has a hx of ETOH abuse and states that his last drink was last night. He states that he drank 1 beer.

## 2022-09-09 NOTE — ED ADULT NURSE REASSESSMENT NOTE - NS ED NURSE REASSESS COMMENT FT1
Break coverage RN: Pt A&Ox4 resting on stretcher. Respirations even and unlabored. Pt offers no complaints at this time. Vital signs stable. CIWA as per flowsheet. NAD noted. Covid swab sent per order. Pending bed assignment. Bed in lowest, safety maintained. Pt aware to call for assistance prior to getting up.

## 2022-09-09 NOTE — H&P ADULT - NS ATTEND AMEND GEN_ALL_CORE FT
Pt seen and examined, agree with the H&P done by ACP, edited as appropriate briefly this is a 50 y/o male with PMHx sig for ETOH abuse (recently admitted in 7/2022 for withdrawal) and TB (treated in 2018) presents to ED with sharp left sided chest pain. Admitted for management of chest pain and for alcohol withdrawal.  Pt has had recent adm for alcohol withdrawal had developed fever during the hospital course, wkup revealed a new prevascular necrotic lymph node measuring 3.0 x 1.5cm compared to his previous chest CT from [facility] when he was undergoing treatment for TB. Due to potential for the necrotic lymph node being malignant vs. TB-related despite the negative AFB, thoracic surgery was consulted (based on prevascular location of the lymph node) for biopsy. Thoracic surgery and infectious disease decided to forgo biopsy and repeat CT chest in 2 months and follow up with infectious disease outpatient.   Exam: as above  Plan: librium taper, psych consult, ciwa, thiamine, mvi, folate  Ct surgery consult for possible bx, chest pain appears atypical, trop neg, cont to monitor for now  Plan discussed with ACP Pt seen and examined, agree with the H&P done by ACP, edited as appropriate briefly this is a 52 y/o male with PMHx sig for ETOH abuse (recently admitted in 7/2022 for withdrawal) and TB (treated in 2018) presents to ED with sharp left sided chest pain. Admitted for management of chest pain and for alcohol withdrawal.  Pt has had recent adm for alcohol withdrawal had developed fever during the hospital course, wkup revealed a new prevascular necrotic lymph node measuring 3.0 x 1.5cm compared to his previous chest CT from [facility] when he was undergoing treatment for TB. Due to potential for the necrotic lymph node being malignant vs. TB-related despite the negative AFB, thoracic surgery was consulted (based on prevascular location of the lymph node) for biopsy. Thoracic surgery and infectious disease decided to forgo biopsy and repeat CT chest in 2 months and follow up with infectious disease outpatient.   Exam: as above  Plan: librium taper, psych consult, ciwa, thiamine, mvi, folate  Ct surgery consult for possible bx, chest pain appears atypical, trop neg, cont to monitor for now  Thrombocytopenia likely related to alcohol use, will monior closely for now  Plan discussed with ACP Pt seen and examined, agree with the H&P done by ACP, edited as appropriate briefly this is a 52 y/o male with PMHx sig for ETOH abuse (recently admitted in 7/2022 for withdrawal) and TB (treated in 2018) presents to ED with sharp left sided chest pain. Admitted for management of chest pain and for alcohol withdrawal.  Pt has had recent adm for alcohol withdrawal had developed fever during the hospital course, wkup revealed a new prevascular necrotic lymph node measuring 3.0 x 1.5cm compared to his previous chest CT from [facility] when he was undergoing treatment for TB. Due to potential for the necrotic lymph node being malignant vs. TB-related despite the negative AFB, thoracic surgery was consulted (based on prevascular location of the lymph node) for biopsy. Thoracic surgery and infectious disease decided to forgo biopsy and repeat CT chest in 2 months and follow up with infectious disease outpatient.   Exam: as above  Plan: librium taper, psych consult, ciwa, thiamine, mvi, folate  Ct surgery consult for possible bx, chest pain appears atypical, trop neg, cont to monitor for now  Thrombocytopenia likely related to alcohol use, hold off on pharmacological dvt ppx, will monior closely for now  Plan discussed with ACP

## 2022-09-09 NOTE — ED PROVIDER NOTE - ATTENDING CONTRIBUTION TO CARE
Dr. Villavicencio: 52 yo male with PMH alcohol use disorder (previous admission for withdrawal, last 2 months ago, now drinking less as per pt--last drink was "1 beer last night"), s/p treatment for TB, in ED with left-sided chest pain that awoke him from sleep overnight, then subsided after a few minutes and returned this morning.  Pain was associated with bilateral low back pain.  No associated SOB, N/V/D, abdominal pain or other complaints.  At time of my eval pt notes that his pain is no longer present.  On exam pt overall well appearing, in NAD, heart RRR, lungs CTAB, abd NTND, extremities without swelling, strength 5/5 in all extremities and skin without rash.  No CVAT bilaterally.  +slight tongue fasciculations and bilateral UE tremor.

## 2022-09-09 NOTE — ED ADULT NURSE NOTE - OBJECTIVE STATEMENT
Received patient in room 26 c/o chest pain, bilateral flank pain x 1 day. Patient has hx of ETOH withdrawl, patient had 1 beer last night. Patient denies fever, chills, SOB, headache, nausea, tremor. Patient is A&OX4, airway patent, breathing unlabored and even, radial pulses palpable, abdomen soft, nontender, skin warm, dry. Labs obtained, 20G IV placed on right arm, medications given as ordered, awaiting CT scan. Side rails up and safety maintained. Fall precaution in place, call bells within reach. Received patient in room 26 c/o chest pain, bilateral flank pain x 1 day. Patient has hx of ETOH abuse, patient had 1 beer last night. Patient denies fever, chills, SOB, headache, nausea, tremor. Patient is A&OX4, airway patent, breathing unlabored and even, radial pulses palpable, abdomen soft, nontender, skin warm, dry. Labs obtained, 20G IV placed on right arm, medications given as ordered, awaiting CT scan. Side rails up and safety maintained. Fall precaution in place, call bells within reach.

## 2022-09-09 NOTE — H&P ADULT - HISTORY OF PRESENT ILLNESS
50 y/o male with PMHx sig for ETOH abuse (admitted in 7/2022 for withdrawal) and TB (treated in 2018) who presents to ED with sharp left sided chest pain that woke him from sleep at 1AM. Patient states he has had this type of pain on and off for a year. Pain is sharp, constant, non radiating, non reproducible, 8 out of 10 when pain is present. Also had bilateral flank pain. Pain subsided after a few minutes and recurred at 6AM. No sob, uri sx, palpitations, n/v,  f/c, night sweats. No urinary or bowel changes, hx of renal stones, testicular pain or swelling. Of note, patient found to have necrotic perivascular lymph node on CT chest in July, CT surgery and pulm recommended repeat scan in 8 wks. 50 y/o male with PMHx sig for ETOH abuse (admitted in 7/2022 for withdrawal) and TB (treated in 2018) who presents to ED with sharp left sided chest pain that woke him from sleep at 1AM. Patient states he has had this type of pain on and off for a year. Pain is sharp, constant, non radiating, non reproducible, 8 out of 10 when pain is present. Also had bilateral flank pain. Pain subsided after a few minutes and recurred at 6AM. No sob, uri sx, palpitations, n/v,  f/c, night sweats. No urinary or bowel changes, hx of renal stones, testicular pain or swelling. Of note, patient found to have necrotic perivascular lymph node on CT chest in July, CT surgery and pulm recommended repeat scan in 8 wks.    Vital Signs Last 24 Hrs  T(C): 36.8 (09 Sep 2022 15:54), Max: 36.9 (09 Sep 2022 08:21)  T(F): 98.2 (09 Sep 2022 15:54), Max: 98.5 (09 Sep 2022 08:21)  HR: 102 (09 Sep 2022 15:54) (73 - 102)  BP: 103/72 (09 Sep 2022 15:54) (103/72 - 121/89)  BP(mean): --  RR: 16 (09 Sep 2022 15:54) (16 - 20)  SpO2: 100% (09 Sep 2022 15:54) (98% - 100%)    Parameters below as of 09 Sep 2022 15:54  Patient On (Oxygen Delivery Method): room air

## 2022-09-09 NOTE — ED PROVIDER NOTE - PROGRESS NOTE DETAILS
Carina Duvall MD (PGY2): Lab work non-actionable. On reassessment, patient with tongue fasciculations. Will admit for etoh withdrawal.

## 2022-09-09 NOTE — PATIENT PROFILE ADULT - FALL HARM RISK - HARM RISK INTERVENTIONS

## 2022-09-09 NOTE — SBIRT NOTE ADULT - NSSBIRTDRGPASSREFTXDET_GEN_A_CORE
Provided SBIRT services: Full screen positive. Referral to Treatment Performed. Screening results were reviewed with the patient and patient was provided information about healthy guidelines and potential negative consequences associated with level of risk. Motivation and readiness to reduce or stop use was discussed and goals and activities to make changes were suggested/offered.    Discussed w/ patients benefits of connecting to treatment such as outpatient services and/or AA meetings. Pt not interested in referral at this time but is open to attend AA meetings. Provided pt with AA schedule information.

## 2022-09-09 NOTE — ED PROVIDER NOTE - PHYSICAL EXAMINATION
General: Alert and Orientated x 3. cachetic-appearing.   Head: Normocephalic and atraumatic.  Eyes: PERRLA with EOMI.  Neck: Supple. Trachea midline.   Cardiac: Normal S1 and S2 w/ RRR. No murmurs appreciated. No JVD appreciated.  Pulmonary: CTA bilaterally. No increased WOB. No wheezes or crackles.  Abdominal: Soft, non-tender. (+) bowel sounds appreciated in all 4 quadrants. No hepatosplenomegaly. B/l cva ttp.   Neurologic: No focal sensory or motor deficits.  Musculoskeletal: Strength appropriate in all 4 extremities for age with no limited ROM.  Skin: Color appropriate for race. Intact, warm, and well-perfused.  Psychiatric: Appropriate mood and affect. No apparent risk to self or others.

## 2022-09-09 NOTE — H&P ADULT - PROBLEM SELECTOR PLAN 1
- Patient with atypical chest pain-   Serial EKGs - non ischemic changes and cardiac enzymes - troponin 9, <6  If EKG changes TWI/STD/MARY KATE or continued chest pain Call cards   CT chest - Left upper lobe persistent paramediastinal opacity abutting the mediastinum appears unchanged in size from 7/13/2022. Differential diagnosis includes but is not limited to neoplasm. Recommend PET/CT for further evaluation.  CT surgery consult  CXR - clear lungs  TTE, NST if indicated

## 2022-09-09 NOTE — CONSULT NOTE ADULT - NS ATTEND AMEND GEN_ALL_CORE FT
51 y.o. male with left mediastinal (level VI) vs. YOLANDA lesion.  Options are inpatietn/outpatient IR biopsy vs. continue observation with 3 month CT chest f/u

## 2022-09-09 NOTE — ED PROVIDER NOTE - OBJECTIVE STATEMENT
Patient is a 50 y/o M with PMHx sig for ETOH abuse (admitted in 7/2022 for withdrawal) and TB (treated in 2018) who presents to ED with L sided chest pain that woke him from sleep at 1AM. Also had bilateral flank pain. Pain subsided after a few mins and recurred at 6AM. No sob, uri sx, palpitations, n/v,  f/c, night sweats. No urinary or bowel changes, hx of renal stones, testicular pain or swelling. Of note, patient found to have necrotic perivascular lymph node on CT chest in July, CT surg and pulm reccomended repeat scan in 8 wks.

## 2022-09-09 NOTE — H&P ADULT - PROBLEM SELECTOR PLAN 2
CIWA orders. High risk-CIWA score now is 0.     Monitor for DT/s’ ETOH Seizures.    Close observation for safety.   Librium taper ordered.   Psych consulted and stated to continue present regimen and call if CIWA score increases  Thiamine 100mg, folic acid 1mg, MVI 10ml  MICU c/s for CIWA score > 15

## 2022-09-09 NOTE — H&P ADULT - ASSESSMENT
50 y/o male with PMHx sig for ETOH abuse (admitted in 7/2022 for withdrawal) and TB (treated in 2018) who presents to ED with sharp left sided chest pain. Patient admitted for management of chest pain and ETOH abuse.      In ED, CT chest showed Left upper lobe persistent paramediastinal opacity abutting the mediastinum appears unchanged in size from 7/13/2022. Differential diagnosis includes but is not limited to neoplasm. Recommend PET/CT for further evaluation.  Adjacent to this opacity, there is a new 5 mm right upper lobe tubular opacity, suggestive of a mucoid impacted airway.  Subcentimeter upper anterior mediastinal lymph node, decreased in size from 7/13/2022.

## 2022-09-09 NOTE — H&P ADULT - PROBLEM SELECTOR PLAN 3
In ED, CT chest showed Left upper lobe persistent paramediastinal opacity abutting the mediastinum appears unchanged in size from 7/13/2022. Differential diagnosis includes but is not limited to neoplasm. Recommend PET/CT for further evaluation. Adjacent to this opacity, there is a new 5 mm right upper lobe tubular opacity, suggestive of a mucoid impacted airway. Subcentimeter upper anterior mediastinal lymph node, decreased in size from 7/13/2022.  - CT surgery consult

## 2022-09-09 NOTE — H&P ADULT - PATIENT'S GENDER IDENTITY
Male I have personally seen and examined this patient.  I have fully participated in the care of this patient. I have reviewed all pertinent clinical information, including history, physical exam, plan and the Resident’s note and agree except as noted.

## 2022-09-10 ENCOUNTER — TRANSCRIPTION ENCOUNTER (OUTPATIENT)
Age: 52
End: 2022-09-10

## 2022-09-10 VITALS
TEMPERATURE: 98 F | DIASTOLIC BLOOD PRESSURE: 75 MMHG | SYSTOLIC BLOOD PRESSURE: 98 MMHG | RESPIRATION RATE: 17 BRPM | OXYGEN SATURATION: 100 % | HEART RATE: 94 BPM

## 2022-09-10 LAB
ALBUMIN SERPL ELPH-MCNC: 3.9 G/DL — SIGNIFICANT CHANGE UP (ref 3.3–5)
ALP SERPL-CCNC: 101 U/L — SIGNIFICANT CHANGE UP (ref 40–120)
ALT FLD-CCNC: 14 U/L — SIGNIFICANT CHANGE UP (ref 4–41)
ANION GAP SERPL CALC-SCNC: 14 MMOL/L — SIGNIFICANT CHANGE UP (ref 7–14)
AST SERPL-CCNC: 29 U/L — SIGNIFICANT CHANGE UP (ref 4–40)
BILIRUB SERPL-MCNC: 0.4 MG/DL — SIGNIFICANT CHANGE UP (ref 0.2–1.2)
BUN SERPL-MCNC: 14 MG/DL — SIGNIFICANT CHANGE UP (ref 7–23)
CALCIUM SERPL-MCNC: 9.4 MG/DL — SIGNIFICANT CHANGE UP (ref 8.4–10.5)
CHLORIDE SERPL-SCNC: 102 MMOL/L — SIGNIFICANT CHANGE UP (ref 98–107)
CO2 SERPL-SCNC: 23 MMOL/L — SIGNIFICANT CHANGE UP (ref 22–31)
CREAT SERPL-MCNC: 0.71 MG/DL — SIGNIFICANT CHANGE UP (ref 0.5–1.3)
CULTURE RESULTS: NO GROWTH — SIGNIFICANT CHANGE UP
EGFR: 112 ML/MIN/1.73M2 — SIGNIFICANT CHANGE UP
GLUCOSE SERPL-MCNC: 97 MG/DL — SIGNIFICANT CHANGE UP (ref 70–99)
HCT VFR BLD CALC: 44.4 % — SIGNIFICANT CHANGE UP (ref 39–50)
HGB BLD-MCNC: 14.1 G/DL — SIGNIFICANT CHANGE UP (ref 13–17)
MAGNESIUM SERPL-MCNC: 1.5 MG/DL — LOW (ref 1.6–2.6)
MCHC RBC-ENTMCNC: 29.6 PG — SIGNIFICANT CHANGE UP (ref 27–34)
MCHC RBC-ENTMCNC: 31.8 GM/DL — LOW (ref 32–36)
MCV RBC AUTO: 93.1 FL — SIGNIFICANT CHANGE UP (ref 80–100)
NRBC # BLD: 0 /100 WBCS — SIGNIFICANT CHANGE UP (ref 0–0)
NRBC # FLD: 0 K/UL — SIGNIFICANT CHANGE UP (ref 0–0)
PHOSPHATE SERPL-MCNC: 3.7 MG/DL — SIGNIFICANT CHANGE UP (ref 2.5–4.5)
PLATELET # BLD AUTO: 93 K/UL — LOW (ref 150–400)
POTASSIUM SERPL-MCNC: 4 MMOL/L — SIGNIFICANT CHANGE UP (ref 3.5–5.3)
POTASSIUM SERPL-SCNC: 4 MMOL/L — SIGNIFICANT CHANGE UP (ref 3.5–5.3)
PROT SERPL-MCNC: 7 G/DL — SIGNIFICANT CHANGE UP (ref 6–8.3)
RBC # BLD: 4.77 M/UL — SIGNIFICANT CHANGE UP (ref 4.2–5.8)
RBC # FLD: 15 % — HIGH (ref 10.3–14.5)
SODIUM SERPL-SCNC: 139 MMOL/L — SIGNIFICANT CHANGE UP (ref 135–145)
SPECIMEN SOURCE: SIGNIFICANT CHANGE UP
WBC # BLD: 4.42 K/UL — SIGNIFICANT CHANGE UP (ref 3.8–10.5)
WBC # FLD AUTO: 4.42 K/UL — SIGNIFICANT CHANGE UP (ref 3.8–10.5)

## 2022-09-10 PROCEDURE — 99223 1ST HOSP IP/OBS HIGH 75: CPT

## 2022-09-10 PROCEDURE — 99239 HOSP IP/OBS DSCHRG MGMT >30: CPT

## 2022-09-10 RX ORDER — MAGNESIUM SULFATE 500 MG/ML
2 VIAL (ML) INJECTION ONCE
Refills: 0 | Status: COMPLETED | OUTPATIENT
Start: 2022-09-10 | End: 2022-09-10

## 2022-09-10 RX ORDER — FOLIC ACID 0.8 MG
1 TABLET ORAL
Qty: 30 | Refills: 0
Start: 2022-09-10 | End: 2022-10-09

## 2022-09-10 RX ORDER — THIAMINE MONONITRATE (VIT B1) 100 MG
1 TABLET ORAL
Qty: 30 | Refills: 0
Start: 2022-09-10 | End: 2022-10-09

## 2022-09-10 RX ORDER — SODIUM CHLORIDE 9 MG/ML
1000 INJECTION INTRAMUSCULAR; INTRAVENOUS; SUBCUTANEOUS ONCE
Refills: 0 | Status: COMPLETED | OUTPATIENT
Start: 2022-09-10 | End: 2022-09-10

## 2022-09-10 RX ORDER — INFLUENZA VIRUS VACCINE 15; 15; 15; 15 UG/.5ML; UG/.5ML; UG/.5ML; UG/.5ML
0.5 SUSPENSION INTRAMUSCULAR ONCE
Refills: 0 | Status: DISCONTINUED | OUTPATIENT
Start: 2022-09-10 | End: 2022-09-10

## 2022-09-10 RX ORDER — MAGNESIUM SULFATE 500 MG/ML
1 VIAL (ML) INJECTION ONCE
Refills: 0 | Status: DISCONTINUED | OUTPATIENT
Start: 2022-09-10 | End: 2022-09-10

## 2022-09-10 RX ADMIN — Medication 25 GRAM(S): at 09:45

## 2022-09-10 RX ADMIN — Medication 50 MILLIGRAM(S): at 09:44

## 2022-09-10 RX ADMIN — Medication 50 MILLIGRAM(S): at 03:15

## 2022-09-10 RX ADMIN — Medication 1 TABLET(S): at 09:45

## 2022-09-10 NOTE — DISCHARGE NOTE NURSING/CASE MANAGEMENT/SOCIAL WORK - PATIENT PORTAL LINK FT
You can access the FollowMyHealth Patient Portal offered by [facility] by registering at the following website: [email or web address]. By joining [employer]’s FollowMyHealth portal, you will also be able to view your health information using other applications (apps) compatible with our system.

## 2022-09-10 NOTE — DISCHARGE NOTE PROVIDER - PROVIDER TOKENS
FREE:[LAST:[Your primary care physician],PHONE:[(   )    -],FAX:[(   )    -],FOLLOWUP:[1 week]],PROVIDER:[TOKEN:[[id number]],FOLLOWUP:[1 week]]

## 2022-09-10 NOTE — DISCHARGE NOTE PROVIDER - CARE PROVIDER_API CALL
Your primary care physician,   Phone: (   )    -  Fax: (   )    -  Follow Up Time: 1 week    Srinivasan Pennington)  Surgery; Thoracic Surgery  [phone] [place] [address], [place] [address]  Phone: [phone]  Fax: [phone]  Follow Up Time: 1 week

## 2022-09-10 NOTE — CHART NOTE - NSCHARTNOTEFT_GEN_A_CORE
Pt seen with DR. Pennington (covering for Dr. Solo)  Pt resting in bed. Laying flat. No distress.  On RA. No c/o CP or SOB at this time  CT scan images reviewed  Recommend IR biopsy of paramediastinal opacity  for diagnosis.  Discussed with pt  Will follow.
Called by RN, as patient wishes to leave AMA.     Patient seen at bedside to further discuss plan of care. Discussed risks of leaving against medical advice, which includes worsening of current medical condition, up to and including death. Patient understands risks and still wishes to leave. AMA paperwork signed and placed in chart. Dr. Booker made aware.

## 2022-09-10 NOTE — DISCHARGE NOTE PROVIDER - NSFOLLOWUPCLINICS_GEN_ALL_ED_FT
Alcoholics Anonymous - 24 hour hotline  Alcoholics Anonymous  .  [place]   Phone: [phone]  Fax:   Follow Up Time: 1 week

## 2022-09-10 NOTE — PROGRESS NOTE ADULT - PROBLEM SELECTOR PLAN 3
In ED, CT chest showed Left upper lobe persistent paramediastinal opacity abutting the mediastinum appears unchanged in size from 7/13/2022. Differential diagnosis includes but is not limited to neoplasm. Recommend PET/CT for further evaluation. Adjacent to this opacity, there is a new 5 mm right upper lobe tubular opacity, suggestive of a mucoid impacted airway. Subcentimeter upper anterior mediastinal lymph node, decreased in size from 7/13/2022.  - CT surgery consult  - recommend IR saurabh

## 2022-09-10 NOTE — DISCHARGE NOTE PROVIDER - HOSPITAL COURSE
52 y/o male with PMHx sig for ETOH abuse (admitted in 7/2022 for withdrawal) and TB (treated in 2018) who presents to ED with sharp left sided chest pain that woke him from sleep at 1AM. Patient states he has had this type of pain on and off for a year. Pain is sharp, constant, non radiating, non reproducible, 8 out of 10 when pain is present. Also had bilateral flank pain.    Chest pain.   - Patient with atypical chest pain  - Serial EKGs - non ischemic changes and cardiac enzymes - troponin 9, <6  - CT chest - Left upper lobe persistent paramediastinal opacity abutting the mediastinum appears unchanged in size from 7/13/2022. Differential diagnosis includes but is not limited to neoplasm. Recommend PET/CT for further evaluation.  - CXR - clear lungs  - TTE, NST if indicated.    ETOH abuse.   - CIWA orders. High risk-CIWA score now is 0.  CIWA score 14 on 9/10 at 3:21 AM  - Monitor for DT/s’ ETOH Seizures.    - Close observation for safety.   - Librium taper ordered.   - Psych consulted and stated to continue present regimen and call if CIWA score increases  - Thiamine 100mg, folic acid 1mg, MVI 10ml  - MICU c/s for CIWA score > 15.    Mass of upper lobe of left lung.   - In ED, CT chest showed Left upper lobe persistent paramediastinal opacity abutting the mediastinum appears unchanged in size from 7/13/2022. Differential diagnosis includes but is not limited to neoplasm. Recommend PET/CT for further evaluation. Adjacent to this opacity, there is a new 5 mm right upper lobe tubular opacity, suggestive of a mucoid impacted airway. Subcentimeter upper anterior mediastinal lymph node, decreased in size from 7/13/2022.  - CT surgery consulted: Recommend IR biopsy of paramediastinal opacity    On 09/10/22, case was discussed with , patient wants to AMA despite discussing risk vs benefits. All medications were reviewed with attending, and sent to mutually agreed upon pharmacy.

## 2022-09-10 NOTE — PROGRESS NOTE ADULT - PROBLEM SELECTOR PLAN 1
- Patient with atypical chest pain-   Serial EKGs - non ischemic changes and cardiac enzymes - troponin 9, <6  If EKG changes TWI/STD/MARY KATE or continued chest pain Call cards   CT chest - Left upper lobe persistent paramediastinal opacity abutting the mediastinum appears unchanged in size from 7/13/2022. Differential diagnosis includes but is not limited to neoplasm. Recommend PET/CT for further evaluation.  CT surgery consult, rec IR biopsy  CXR - clear lungs  CP - resolved

## 2022-09-10 NOTE — PROGRESS NOTE ADULT - SUBJECTIVE AND OBJECTIVE BOX
subjective:  NAEO   VSS  patient reports improved CP. Denies sob, no tremors, no agitation. denies auditory and visual hallucinations.  patient requests to be discharged. explained to patient the need for further monitoring and hospitalization. patient is adamant and wants to leave ama.   explained the risk of leaving which patient states he understands. patient has capacity to leave, signed the AMA form.     VITAL SIGNS:  T(C): 36.6 (09-10-22 @ 12:00), Max: 36.8 (09-10-22 @ 08:01)  T(F): 97.8 (09-10-22 @ :00), Max: 98.3 (09-10-22 @ 08:01)  HR: 94 (09-10-22 @ :00) (72 - 107)  BP: 98/75 (09-10-22 @ :00) (93/76 - 117/79)  BP(mean): 81 (09-10-22 @ :00) (81 - 81)  RR: 17 (09-10-22 @ :) (16 - 18)  SpO2: 100% (09-10-22 @ :00) (98% - 100%)  Wt(kg): --    PHYSICAL EXAM:  Constitutional: WDWN resting comfortably in bed; NAD  Head: NC/AT  Eyes: PERRL,  anicteric sclera  Neck: supple; no JVD  Respiratory: CTA B/L; no W/R/R  Cardiac: +S1/S2; RRR; no M/R/G  Gastrointestinal: soft, NT/ND; no rebound or guarding; +BSx4  Extremities: WWP, no clubbing or cyanosis; no peripheral edema  Musculoskeletal: NROM x4; no joint swelling, tenderness or erythema  Neurologic: AAOx3; CNII-XII grossly intact; no focal deficits        LABS:                        14.1   4.42  )-----------( 93       ( 10 Sep 2022 05:30 )             44.4     09-10    139  |  102  |  14  ----------------------------<  97  4.0   |  23  |  0.71    Ca    9.4      10 Sep 2022 05:30  Phos  3.7     09-10  Mg     1.50     09-10    TPro  7.0  /  Alb  3.9  /  TBili  0.4  /  DBili  x   /  AST  29  /  ALT  14  /  AlkPhos  101  -10      Urinalysis Basic - ( 09 Sep 2022 09:23 )    Color: Colorless / Appearance: Clear / S.004 / pH: x  Gluc: x / Ketone: Negative  / Bili: Negative / Urobili: <2 mg/dL   Blood: x / Protein: Negative / Nitrite: Negative   Leuk Esterase: Negative / RBC: x / WBC x   Sq Epi: x / Non Sq Epi: x / Bacteria: x      CAPILLARY BLOOD GLUCOSE          RADIOLOGY & ADDITIONAL TESTS: Reviewed.

## 2022-09-10 NOTE — DISCHARGE NOTE PROVIDER - NSDCCPCAREPLAN_GEN_ALL_CORE_FT
PRINCIPAL DISCHARGE DIAGNOSIS  Diagnosis: Chest pain  Assessment and Plan of Treatment: You experienced sharp left sided chest pain. Your blood tests were negative and the picture of your heart was also not concerning. Please follow up with your primary care physician.      SECONDARY DISCHARGE DIAGNOSES  Diagnosis: Mass of upper lobe of left lung  Assessment and Plan of Treatment: On CT imaging of chest, you were shown to have a mass in the upper part of your left lung. We planned to do a biopsy of this section of lung but since you want to do outpatient, please follow up with your primary care physician / lung doctor.    Diagnosis: ETOH abuse  Assessment and Plan of Treatment: Attend alcoholism treatment program, practice the Twelve Steps of AA. Go to meetings to help you cope with uncomfortable feelings, and to help you develop a relapse prevention plan to prevent complications associated with alcohol intoxication. Please continue taking vitamins, folic acid, and thiamine to replenish. Follow up with your primary care physician.

## 2022-09-10 NOTE — PROGRESS NOTE ADULT - NSPROGADDITIONALINFOA_GEN_ALL_CORE
Patient left AMA despite advising agianst it.  patient understands the risk i.e possible fall, bleeding, syncope and death. forms signed. outpatient meds sent and fu apt provided.

## 2022-09-10 NOTE — CONSULT NOTE ADULT - SUBJECTIVE AND OBJECTIVE BOX
Vascular & Interventional Radiology    HPI: 51y Male with PMHx sig for ETOH abuse (admitted in 7/2022 for withdrawal) and TB (treated in 2018) who presented to ED with sharp left sided chest pain that woke him from sleep at 1AM. Patient states he had this type of pain on and off for a year. Pain is sharp, constant, non radiating, non reproducible, 8 out of 10 when pain is present. Also had bilateral flank pain. Pain subsided after a few minutes and recurred at 6AM. No sob, uri sx, palpitations, n/v,  f/c, night sweats. No urinary or bowel changes, hx of renal stones, testicular pain or swelling. Of note, patient found to have necrotic perivascular lymph node on CT chest in July, CT surgery and pulm recommended repeat scan in 8 wks. Repeat CT showing no change in necrotic perivascular lymph node compared to CT from July.      Data:  T(C): 36.6  HR: 94  BP: 98/75  RR: 17  SpO2: 100%    -WBC 4.42 / HgB 14.1 / Hct 44.4 / Plt 93  -Na 139 / Cl 102 / BUN 14 / Glucose 97  -K 4.0 / CO2 23 / Cr 0.71  -ALT 14 / Alk Phos 101 / T.Bili 0.4    Imaging: reviewed and as in HPI.    Assessment:   51y Male w/ history of ETOH abuse, TB, and YOLANDA paramediastinal lesion.    Plan:   - Will defer biopsy. There is no good percutaneous window given proximity to internal mammary artery and aorta.  - Reconsult as needed.      --  Haroldo Molina MD, PGY-6  Vascular and Interventional Radiology  Available on [employer] Teams    - Nonemergent consults:  [facility] order "Consult- Interventional Radiology"  - Emergent issues (pager): [facility] [phone]; [facility] [phone]; [phone]  - Scheduling questions: [facility] [phone]; [facility] [phone]  - Clinic/outpatient booking: [facility] [phone]; [facility] [phone]  Vascular & Interventional Radiology    HPI: 51y Male with PMHx sig for ETOH abuse (admitted in 7/2022 for withdrawal) and TB (treated in 2018) who presented to ED with sharp left sided chest pain that woke him from sleep at 1AM. Patient states he had this type of pain on and off for a year. Pain is sharp, constant, non radiating, non reproducible, 8 out of 10 when pain is present. Also had bilateral flank pain. Pain subsided after a few minutes and recurred at 6AM. No sob, uri sx, palpitations, n/v,  f/c, night sweats. No urinary or bowel changes, hx of renal stones, testicular pain or swelling. Of note, patient found to have necrotic perivascular lymph node on CT chest in July, CT surgery and pulm recommended repeat scan in 8 wks. Repeat CT showing no change in necrotic perivascular lymph node compared to CT from July.      Data:  T(C): 36.6  HR: 94  BP: 98/75  RR: 17  SpO2: 100%    -WBC 4.42 / HgB 14.1 / Hct 44.4 / Plt 93  -Na 139 / Cl 102 / BUN 14 / Glucose 97  -K 4.0 / CO2 23 / Cr 0.71  -ALT 14 / Alk Phos 101 / T.Bili 0.4    Imaging: reviewed and as in HPI.    Assessment:   51y Male w/ history of ETOH abuse, TB, and YOLANDA paramediastinal lesion.    Plan:   - Will defer biopsy. There is a very poor, high risk, percutaneous window given proximity to internal mammary artery and aorta.  - Reconsult as needed.      --  Haroldo Molina MD, PGY-6  Vascular and Interventional Radiology  Available on [employer] Teams    - Nonemergent consults:  [facility] order "Consult- Interventional Radiology"  - Emergent issues (pager): [facility] [phone]; [facility] [phone]; [phone]  - Scheduling questions: [facility] [phone]; [facility] [phone]  - Clinic/outpatient booking: [facility] [phone]; [facility] [phone]

## 2022-09-12 ENCOUNTER — NON-APPOINTMENT (OUTPATIENT)
Age: 52
End: 2022-09-12

## 2022-09-14 ENCOUNTER — NON-APPOINTMENT (OUTPATIENT)
Age: 52
End: 2022-09-14
